# Patient Record
Sex: FEMALE | Race: WHITE | NOT HISPANIC OR LATINO | Employment: UNEMPLOYED | URBAN - METROPOLITAN AREA
[De-identification: names, ages, dates, MRNs, and addresses within clinical notes are randomized per-mention and may not be internally consistent; named-entity substitution may affect disease eponyms.]

---

## 2017-01-03 ENCOUNTER — ALLSCRIPTS OFFICE VISIT (OUTPATIENT)
Dept: OTHER | Facility: OTHER | Age: 32
End: 2017-01-03

## 2017-01-03 LAB
BILIRUB UR QL STRIP: NORMAL
CLARITY UR: NORMAL
COLOR UR: YELLOW
GLUCOSE (HISTORICAL): NORMAL
HGB UR QL STRIP.AUTO: NORMAL
KETONES UR STRIP-MCNC: NORMAL MG/DL
LEUKOCYTE ESTERASE UR QL STRIP: NORMAL
NITRITE UR QL STRIP: NORMAL
PH UR STRIP.AUTO: 5 [PH]
PROT UR STRIP-MCNC: NORMAL MG/DL
SP GR UR STRIP.AUTO: 1.01

## 2017-01-08 ENCOUNTER — HOSPITAL ENCOUNTER (OUTPATIENT)
Dept: RADIOLOGY | Facility: HOSPITAL | Age: 32
Discharge: HOME | End: 2017-01-08
Attending: PHYSICAL MEDICINE & REHABILITATION
Payer: COMMERCIAL

## 2017-01-08 DIAGNOSIS — S32.012S CLOSED UNSTABLE BURST FRACTURE OF FIRST LUMBAR VERTEBRA, SEQUELA: ICD-10-CM

## 2017-01-08 PROCEDURE — 72148 MRI LUMBAR SPINE W/O DYE: CPT

## 2017-01-16 ENCOUNTER — ALLSCRIPTS OFFICE VISIT (OUTPATIENT)
Dept: OTHER | Facility: OTHER | Age: 32
End: 2017-01-16

## 2017-01-18 ENCOUNTER — ALLSCRIPTS OFFICE VISIT (OUTPATIENT)
Dept: OTHER | Facility: OTHER | Age: 32
End: 2017-01-18

## 2017-02-02 ENCOUNTER — GENERIC CONVERSION - ENCOUNTER (OUTPATIENT)
Dept: OTHER | Facility: OTHER | Age: 32
End: 2017-02-02

## 2017-02-02 ENCOUNTER — LAB CONVERSION - ENCOUNTER (OUTPATIENT)
Dept: OTHER | Facility: OTHER | Age: 32
End: 2017-02-02

## 2017-02-02 LAB
HBA1C MFR BLD HPLC: 4.8 % OF TOTAL HGB
TSH SERPL DL<=0.05 MIU/L-ACNC: 2.25 MIU/L

## 2017-02-22 ENCOUNTER — ALLSCRIPTS OFFICE VISIT (OUTPATIENT)
Dept: OTHER | Facility: OTHER | Age: 32
End: 2017-02-22

## 2017-02-23 ENCOUNTER — HOSPITAL ENCOUNTER (OUTPATIENT)
Dept: RADIOLOGY | Facility: HOSPITAL | Age: 32
Discharge: HOME | End: 2017-02-23
Payer: COMMERCIAL

## 2017-02-23 ENCOUNTER — TRANSCRIBE ORDERS (OUTPATIENT)
Dept: RADIOLOGY | Facility: HOSPITAL | Age: 32
End: 2017-02-23

## 2017-02-23 DIAGNOSIS — M54.9 BACKACHE, UNSPECIFIED: Primary | ICD-10-CM

## 2017-02-23 DIAGNOSIS — M54.9 BACKACHE, UNSPECIFIED: ICD-10-CM

## 2017-02-23 PROCEDURE — 72072 X-RAY EXAM THORAC SPINE 3VWS: CPT

## 2017-02-24 ENCOUNTER — GENERIC CONVERSION - ENCOUNTER (OUTPATIENT)
Dept: OTHER | Facility: OTHER | Age: 32
End: 2017-02-24

## 2017-03-06 ENCOUNTER — APPOINTMENT (OUTPATIENT)
Dept: PHYSICAL THERAPY | Facility: REHABILITATION | Age: 32
End: 2017-03-06
Payer: COMMERCIAL

## 2017-03-06 PROCEDURE — 97112 NEUROMUSCULAR REEDUCATION: CPT

## 2017-03-06 PROCEDURE — 97162 PT EVAL MOD COMPLEX 30 MIN: CPT

## 2017-03-07 ENCOUNTER — GENERIC CONVERSION - ENCOUNTER (OUTPATIENT)
Dept: OTHER | Facility: OTHER | Age: 32
End: 2017-03-07

## 2017-03-10 ENCOUNTER — APPOINTMENT (OUTPATIENT)
Dept: PHYSICAL THERAPY | Facility: REHABILITATION | Age: 32
End: 2017-03-10
Payer: COMMERCIAL

## 2017-03-10 PROCEDURE — 97110 THERAPEUTIC EXERCISES: CPT

## 2017-03-10 PROCEDURE — 97112 NEUROMUSCULAR REEDUCATION: CPT

## 2017-03-14 ENCOUNTER — APPOINTMENT (OUTPATIENT)
Dept: PHYSICAL THERAPY | Facility: REHABILITATION | Age: 32
End: 2017-03-14
Payer: COMMERCIAL

## 2017-03-17 ENCOUNTER — APPOINTMENT (OUTPATIENT)
Dept: PHYSICAL THERAPY | Facility: REHABILITATION | Age: 32
End: 2017-03-17
Payer: COMMERCIAL

## 2017-03-17 PROCEDURE — 97112 NEUROMUSCULAR REEDUCATION: CPT

## 2017-03-21 ENCOUNTER — APPOINTMENT (OUTPATIENT)
Dept: PHYSICAL THERAPY | Facility: REHABILITATION | Age: 32
End: 2017-03-21
Payer: COMMERCIAL

## 2017-03-21 PROCEDURE — 97112 NEUROMUSCULAR REEDUCATION: CPT

## 2017-03-21 PROCEDURE — 97110 THERAPEUTIC EXERCISES: CPT

## 2017-03-24 ENCOUNTER — APPOINTMENT (OUTPATIENT)
Dept: PHYSICAL THERAPY | Facility: REHABILITATION | Age: 32
End: 2017-03-24
Payer: COMMERCIAL

## 2017-03-24 PROCEDURE — 97112 NEUROMUSCULAR REEDUCATION: CPT

## 2017-03-24 PROCEDURE — 97140 MANUAL THERAPY 1/> REGIONS: CPT

## 2017-03-24 PROCEDURE — 97110 THERAPEUTIC EXERCISES: CPT

## 2017-03-28 ENCOUNTER — APPOINTMENT (OUTPATIENT)
Dept: PHYSICAL THERAPY | Facility: REHABILITATION | Age: 32
End: 2017-03-28
Payer: COMMERCIAL

## 2017-03-28 PROCEDURE — 97112 NEUROMUSCULAR REEDUCATION: CPT

## 2017-03-28 PROCEDURE — 97110 THERAPEUTIC EXERCISES: CPT

## 2017-03-28 PROCEDURE — 97140 MANUAL THERAPY 1/> REGIONS: CPT

## 2017-03-31 ENCOUNTER — APPOINTMENT (OUTPATIENT)
Dept: PHYSICAL THERAPY | Facility: REHABILITATION | Age: 32
End: 2017-03-31
Payer: COMMERCIAL

## 2017-04-03 ENCOUNTER — APPOINTMENT (OUTPATIENT)
Dept: PHYSICAL THERAPY | Facility: REHABILITATION | Age: 32
End: 2017-04-03
Payer: COMMERCIAL

## 2017-04-03 PROCEDURE — 97140 MANUAL THERAPY 1/> REGIONS: CPT

## 2017-04-03 PROCEDURE — 97112 NEUROMUSCULAR REEDUCATION: CPT

## 2017-04-03 PROCEDURE — 97110 THERAPEUTIC EXERCISES: CPT

## 2017-04-07 ENCOUNTER — APPOINTMENT (OUTPATIENT)
Dept: PHYSICAL THERAPY | Facility: REHABILITATION | Age: 32
End: 2017-04-07
Payer: COMMERCIAL

## 2017-04-07 PROCEDURE — 97110 THERAPEUTIC EXERCISES: CPT

## 2017-04-07 PROCEDURE — 97140 MANUAL THERAPY 1/> REGIONS: CPT

## 2017-04-07 PROCEDURE — 97112 NEUROMUSCULAR REEDUCATION: CPT

## 2017-04-11 ENCOUNTER — APPOINTMENT (OUTPATIENT)
Dept: PHYSICAL THERAPY | Facility: REHABILITATION | Age: 32
End: 2017-04-11
Payer: COMMERCIAL

## 2017-04-11 PROCEDURE — 97112 NEUROMUSCULAR REEDUCATION: CPT

## 2017-04-11 PROCEDURE — 97110 THERAPEUTIC EXERCISES: CPT

## 2017-04-11 PROCEDURE — 97140 MANUAL THERAPY 1/> REGIONS: CPT

## 2017-04-17 ENCOUNTER — APPOINTMENT (OUTPATIENT)
Dept: PHYSICAL THERAPY | Facility: REHABILITATION | Age: 32
End: 2017-04-17
Payer: COMMERCIAL

## 2017-04-17 PROCEDURE — 97110 THERAPEUTIC EXERCISES: CPT

## 2017-04-17 PROCEDURE — 97112 NEUROMUSCULAR REEDUCATION: CPT

## 2017-04-17 PROCEDURE — 97140 MANUAL THERAPY 1/> REGIONS: CPT

## 2017-04-18 ENCOUNTER — GENERIC CONVERSION - ENCOUNTER (OUTPATIENT)
Dept: OTHER | Facility: OTHER | Age: 32
End: 2017-04-18

## 2017-04-21 ENCOUNTER — APPOINTMENT (OUTPATIENT)
Dept: PHYSICAL THERAPY | Facility: REHABILITATION | Age: 32
End: 2017-04-21
Payer: COMMERCIAL

## 2017-05-08 ENCOUNTER — ALLSCRIPTS OFFICE VISIT (OUTPATIENT)
Dept: OTHER | Facility: OTHER | Age: 32
End: 2017-05-08

## 2017-06-20 ENCOUNTER — ALLSCRIPTS OFFICE VISIT (OUTPATIENT)
Dept: OTHER | Facility: OTHER | Age: 32
End: 2017-06-20

## 2017-06-20 DIAGNOSIS — R11.0 NAUSEA: ICD-10-CM

## 2017-06-20 DIAGNOSIS — R53.83 OTHER FATIGUE: ICD-10-CM

## 2017-06-20 LAB
BILIRUB UR QL STRIP: NEGATIVE
CLARITY UR: NORMAL
COLOR UR: YELLOW
GLUCOSE (HISTORICAL): NEGATIVE
HGB UR QL STRIP.AUTO: NEGATIVE
KETONES UR STRIP-MCNC: NEGATIVE MG/DL
LEUKOCYTE ESTERASE UR QL STRIP: NORMAL
NITRITE UR QL STRIP: NEGATIVE
PH UR STRIP.AUTO: 6 [PH]
PROT UR STRIP-MCNC: NEGATIVE MG/DL
SP GR UR STRIP.AUTO: 1
UROBILINOGEN UR QL STRIP.AUTO: 0.2

## 2017-06-21 ENCOUNTER — APPOINTMENT (OUTPATIENT)
Dept: LAB | Facility: HOSPITAL | Age: 32
End: 2017-06-21
Payer: COMMERCIAL

## 2017-06-21 DIAGNOSIS — R11.0 NAUSEA: ICD-10-CM

## 2017-06-21 DIAGNOSIS — R53.83 OTHER FATIGUE: ICD-10-CM

## 2017-06-21 LAB
BACTERIA UR QL AUTO: NORMAL /HPF
BILIRUB UR QL STRIP: NEGATIVE
CLARITY UR: CLEAR
COLOR UR: YELLOW
GLUCOSE UR STRIP-MCNC: NEGATIVE MG/DL
HGB UR QL STRIP.AUTO: NEGATIVE
HYALINE CASTS #/AREA URNS LPF: NORMAL /LPF
KETONES UR STRIP-MCNC: NEGATIVE MG/DL
LEUKOCYTE ESTERASE UR QL STRIP: ABNORMAL
NITRITE UR QL STRIP: NEGATIVE
NON-SQ EPI CELLS URNS QL MICRO: NORMAL /HPF
PH UR STRIP.AUTO: 6.5 [PH] (ref 4.5–8)
PROT UR STRIP-MCNC: NEGATIVE MG/DL
RBC #/AREA URNS AUTO: NORMAL /HPF
SP GR UR STRIP.AUTO: 1.01 (ref 1–1.03)
UROBILINOGEN UR QL STRIP.AUTO: 0.2 E.U./DL
WBC #/AREA URNS AUTO: NORMAL /HPF

## 2017-06-21 PROCEDURE — 81001 URINALYSIS AUTO W/SCOPE: CPT

## 2017-06-22 ENCOUNTER — GENERIC CONVERSION - ENCOUNTER (OUTPATIENT)
Dept: OTHER | Facility: OTHER | Age: 32
End: 2017-06-22

## 2017-06-22 ENCOUNTER — LAB CONVERSION - ENCOUNTER (OUTPATIENT)
Dept: OTHER | Facility: OTHER | Age: 32
End: 2017-06-22

## 2017-06-22 LAB
25(OH)D3 SERPL-MCNC: 35 NG/ML (ref 30–100)
A/G RATIO (HISTORICAL): 1.7 (CALC) (ref 1–2.5)
ALBUMIN SERPL BCP-MCNC: 4.5 G/DL (ref 3.6–5.1)
ALP SERPL-CCNC: 72 U/L (ref 33–115)
ALT SERPL W P-5'-P-CCNC: 15 U/L (ref 6–29)
AST SERPL W P-5'-P-CCNC: 14 U/L (ref 10–30)
BASOPHILS # BLD AUTO: 0.3 %
BASOPHILS # BLD AUTO: 11 CELLS/UL (ref 0–200)
BILIRUB SERPL-MCNC: 0.5 MG/DL (ref 0.2–1.2)
BUN SERPL-MCNC: 9 MG/DL (ref 7–25)
BUN/CREA RATIO (HISTORICAL): NORMAL (CALC) (ref 6–22)
C-REACTIVE PROTEIN (HISTORICAL): <0.1 MG/DL
CALCIUM SERPL-MCNC: 9.4 MG/DL (ref 8.6–10.2)
CHLORIDE SERPL-SCNC: 102 MMOL/L (ref 98–110)
CO2 SERPL-SCNC: 27 MMOL/L (ref 20–31)
CREAT SERPL-MCNC: 0.62 MG/DL (ref 0.5–1.1)
DEPRECATED RDW RBC AUTO: 13.1 % (ref 11–15)
EGFR AFRICAN AMERICAN (HISTORICAL): 141 ML/MIN/1.73M2
EGFR-AMERICAN CALC (HISTORICAL): 122 ML/MIN/1.73M2
EOSINOPHIL # BLD AUTO: 2.2 %
EOSINOPHIL # BLD AUTO: 84 CELLS/UL (ref 15–500)
ERYTHROCYTE SEDIMENTATION RATE (HISTORICAL): 2 MM/H
GAMMA GLOBULIN (HISTORICAL): 2.6 G/DL (CALC) (ref 1.9–3.7)
GLUCOSE (HISTORICAL): 77 MG/DL (ref 65–99)
HCT VFR BLD AUTO: 43.3 % (ref 35–45)
HGB BLD-MCNC: 14.6 G/DL (ref 11.7–15.5)
LYME IGG/IGM AB (HISTORICAL): <0.9 INDEX
LYMPHOCYTES # BLD AUTO: 1288 CELLS/UL (ref 850–3900)
LYMPHOCYTES # BLD AUTO: 33.9 %
MAGNESIUM SERPL-MCNC: 2.1 MG/DL (ref 1.5–2.5)
MCH RBC QN AUTO: 31.4 PG (ref 27–33)
MCHC RBC AUTO-ENTMCNC: 33.7 G/DL (ref 32–36)
MCV RBC AUTO: 93.3 FL (ref 80–100)
MONOCYTES # BLD AUTO: 228 CELLS/UL (ref 200–950)
MONOCYTES (HISTORICAL): 6 %
NEUTROPHILS # BLD AUTO: 2189 CELLS/UL (ref 1500–7800)
NEUTROPHILS # BLD AUTO: 57.6 %
PLATELET # BLD AUTO: 232 THOUSAND/UL (ref 140–400)
PMV BLD AUTO: 8 FL (ref 7.5–12.5)
POTASSIUM SERPL-SCNC: 4.1 MMOL/L (ref 3.5–5.3)
RBC # BLD AUTO: 4.65 MILLION/UL (ref 3.8–5.1)
SODIUM SERPL-SCNC: 135 MMOL/L (ref 135–146)
TOTAL PROTEIN (HISTORICAL): 7.1 G/DL (ref 6.1–8.1)
TSH SERPL DL<=0.05 MIU/L-ACNC: 1.75 MIU/L
VIT B12 SERPL-MCNC: 594 PG/ML (ref 200–1100)
WBC # BLD AUTO: 3.8 THOUSAND/UL (ref 3.8–10.8)

## 2017-10-03 ENCOUNTER — GENERIC CONVERSION - ENCOUNTER (OUTPATIENT)
Dept: OTHER | Facility: OTHER | Age: 32
End: 2017-10-03

## 2017-10-16 ENCOUNTER — GENERIC CONVERSION - ENCOUNTER (OUTPATIENT)
Dept: OTHER | Facility: OTHER | Age: 32
End: 2017-10-16

## 2017-12-05 ENCOUNTER — ANESTHESIA EVENT (OUTPATIENT)
Dept: PERIOP | Facility: AMBULARY SURGERY CENTER | Age: 32
End: 2017-12-05
Payer: COMMERCIAL

## 2017-12-11 RX ORDER — METHOCARBAMOL 500 MG/1
500 TABLET, FILM COATED ORAL AS NEEDED
COMMUNITY
End: 2018-06-05

## 2017-12-11 RX ORDER — HYDROXYZINE HYDROCHLORIDE 25 MG/1
25 TABLET, FILM COATED ORAL EVERY 6 HOURS PRN
COMMUNITY
End: 2019-02-27

## 2017-12-11 RX ORDER — CARBAMAZEPINE 400 MG/1
400 TABLET, EXTENDED RELEASE ORAL 2 TIMES DAILY
COMMUNITY
End: 2018-02-22 | Stop reason: SDUPTHER

## 2017-12-11 RX ORDER — CARBAMAZEPINE 200 MG/1
200 TABLET ORAL 2 TIMES DAILY
COMMUNITY
End: 2021-04-09

## 2017-12-11 RX ORDER — ONDANSETRON 4 MG/1
4 TABLET, FILM COATED ORAL EVERY 8 HOURS PRN
COMMUNITY
End: 2018-02-22 | Stop reason: ALTCHOICE

## 2017-12-11 RX ORDER — TRAMADOL HYDROCHLORIDE 50 MG/1
50 TABLET ORAL EVERY 6 HOURS PRN
COMMUNITY
End: 2018-06-05

## 2017-12-12 ENCOUNTER — ANESTHESIA (OUTPATIENT)
Dept: PERIOP | Facility: AMBULARY SURGERY CENTER | Age: 32
End: 2017-12-12
Payer: COMMERCIAL

## 2017-12-12 ENCOUNTER — GENERIC CONVERSION - ENCOUNTER (OUTPATIENT)
Dept: GASTROENTEROLOGY | Facility: CLINIC | Age: 32
End: 2017-12-12

## 2017-12-12 ENCOUNTER — GENERIC CONVERSION - ENCOUNTER (OUTPATIENT)
Dept: OTHER | Facility: OTHER | Age: 32
End: 2017-12-12

## 2017-12-12 ENCOUNTER — HOSPITAL ENCOUNTER (OUTPATIENT)
Facility: AMBULARY SURGERY CENTER | Age: 32
Setting detail: OUTPATIENT SURGERY
Discharge: HOME | End: 2017-12-12
Attending: INTERNAL MEDICINE | Admitting: INTERNAL MEDICINE
Payer: COMMERCIAL

## 2017-12-12 VITALS
DIASTOLIC BLOOD PRESSURE: 57 MMHG | RESPIRATION RATE: 16 BRPM | OXYGEN SATURATION: 100 % | BODY MASS INDEX: 21.85 KG/M2 | SYSTOLIC BLOOD PRESSURE: 99 MMHG | TEMPERATURE: 97.7 F | WEIGHT: 128 LBS | HEART RATE: 72 BPM | HEIGHT: 64 IN

## 2017-12-12 DIAGNOSIS — K59.00 CONSTIPATION: ICD-10-CM

## 2017-12-12 LAB — EXT PREGNANCY TEST URINE: NEGATIVE

## 2017-12-12 PROCEDURE — 81025 URINE PREGNANCY TEST: CPT | Performed by: INTERNAL MEDICINE

## 2017-12-12 PROCEDURE — 88305 TISSUE EXAM BY PATHOLOGIST: CPT | Performed by: INTERNAL MEDICINE

## 2017-12-12 RX ORDER — PROPOFOL 10 MG/ML
INJECTION, EMULSION INTRAVENOUS CONTINUOUS PRN
Status: DISCONTINUED | OUTPATIENT
Start: 2017-12-12 | End: 2017-12-12 | Stop reason: SURG

## 2017-12-12 RX ORDER — SODIUM CHLORIDE 9 MG/ML
INJECTION, SOLUTION INTRAVENOUS CONTINUOUS PRN
Status: DISCONTINUED | OUTPATIENT
Start: 2017-12-12 | End: 2017-12-12 | Stop reason: SURG

## 2017-12-12 RX ORDER — PROPOFOL 10 MG/ML
INJECTION, EMULSION INTRAVENOUS AS NEEDED
Status: DISCONTINUED | OUTPATIENT
Start: 2017-12-12 | End: 2017-12-12 | Stop reason: SURG

## 2017-12-12 RX ADMIN — PROPOFOL 50 MG: 10 INJECTION, EMULSION INTRAVENOUS at 08:43

## 2017-12-12 RX ADMIN — PROPOFOL 180 MCG/KG/MIN: 10 INJECTION, EMULSION INTRAVENOUS at 08:41

## 2017-12-12 RX ADMIN — PROPOFOL 100 MG: 10 INJECTION, EMULSION INTRAVENOUS at 08:41

## 2017-12-12 RX ADMIN — SODIUM CHLORIDE: 0.9 INJECTION, SOLUTION INTRAVENOUS at 08:20

## 2017-12-12 RX ADMIN — PROPOFOL 50 MG: 10 INJECTION, EMULSION INTRAVENOUS at 08:42

## 2017-12-12 NOTE — ANESTHESIA POSTPROCEDURE EVALUATION
Post-Op Assessment Note      CV Status:  Stable    Mental Status:  Alert and awake    Hydration Status:  Euvolemic    PONV Controlled:  Controlled    Airway Patency:  Patent    Post Op Vitals Reviewed: Yes          Staff: CRNA           BP   111/62   Temp      Pulse  76   Resp   18   SpO2   100%

## 2017-12-12 NOTE — ANESTHESIA PREPROCEDURE EVALUATION
Review of Systems/Medical History  Patient summary reviewed  Chart reviewed      Cardiovascular  No hypertension , No CAD, ,    Pulmonary  Not a smoker , No asthma: , ,        GI/Hepatic            Endo/Other     GYN       Hematology   Musculoskeletal       Neurology    Motor deficit , Spinal cord lesion,   Comment: Partial L1 cord injury after a fall s/p T12-L2 fusion; stable per notes  Psychology   Depression , bipolar disorder,            Physical Exam    Airway    Mallampati score: II  TM Distance: >3 FB       Dental       Cardiovascular      Pulmonary      Other Findings        Anesthesia Plan  ASA Score- 2       Anesthesia Type- IV sedation with anesthesia with ASA Monitors  Additional Monitors:   Airway Plan:     Comment: Hershall Sacks, M D , have personally seen and evaluated the patient prior to anesthetic care  I have reviewed the pre-anesthetic record, and other medical records if appropriate to the anesthetic care  If a CRNA is involved in the case, I have reviewed the CRNA assessment, if present, and agree  Risks/benefits and alternatives discussed with patient including possible PONV, sore throat, and possibility of rare anesthetic and surgical emergencies          Induction-       Informed Consent- Anesthetic plan and risks discussed with patient  I personally reviewed this patient with the CRNA  Discussed and agreed on the Anesthesia Plan with the CRNA  Kailey Garzon

## 2017-12-12 NOTE — OP NOTE
Colonoscopy Procedure Note    Procedure: Colonoscopy    Sedation: Monitored anesthesia care, check anesthesia records      ASA Class: 2    INDICATIONS: Constipation    POST-OP DIAGNOSIS: See the impression below    Procedure Details     Prior colonoscopy: No prior colonoscopy  Informed consent was obtained for the procedure, including sedation  Risks of perforation, hemorrhage, adverse drug reaction and aspiration were discussed  The patient was placed in the left lateral decubitus position  Based on the pre-procedure assessment, including review of the patient's medical history, medications, allergies, and review of systems, she had been deemed to be an appropriate candidate for conscious sedation; she was therefore sedated with the medications listed below  The patient was monitored continuously with telemetry, pulse oximetry, blood pressure monitoring, and direct observations  A rectal examination was performed  The variable-stiffness pediatric colonoscope was inserted into the rectum and advanced under direct vision to the cecum, which was identified by the ileocecal valve and appendiceal orifice  The quality of the colonic preparation was good  A careful inspection was made as the colonoscope was withdrawn, including a retroflexed view of the rectum; findings and interventions are described below  Start time:  8:41 a m  Cecum time 8:44 a m  End time 8:52 a m  Findings:  1  Diminutive ascending colon polyp, removed using biopsy forceps  2   Otherwise normal appearing colonic mucosa  3   Retroflexion was performed and revealed small internal hemorrhoids  Complications:  None; patient tolerated the procedure well  Impression:    1  Single ascending colon polyp  2   Small internal hemorrhoids  Recommendations:  High-fiber diet  Continue Linzess as as you are doing  Follow-up biopsy results in 2-3 weeks    If the polyp is adenomatous, repeat colonoscopy in 5 years otherwise repeat when age-appropriate screening is recommended

## 2017-12-12 NOTE — H&P
History and Physical - [facility]  Vincent Donohue 29 y o  female MRN: [id number]    HPI: Vincent Donohue is a 29y o  year old female who presents with change in bowel habits and chronic constipation  Review of Systems    Historical Information   Past Medical History:   Diagnosis Date    Anxiety     Depression     Foot drop     Spinal cord injury at C1-C4 level [facility])      Past Surgical History:   Procedure Laterality Date    SPINAL FUSION       Social History   History   Alcohol Use    Yes     Comment: 2 wkly     History   Drug Use No     History   Smoking Status    Former Smoker    Packs/day: 1 00    Quit date: 12/12/2016   Smokeless Tobacco    Never Used     History reviewed  No pertinent family history  Meds/Allergies     Prescriptions Prior to Admission   Medication    carBAMazepine (TEGretol XR) 400 mg 12 hr tablet    carBAMazepine (TEGretol) 200 mg tablet    hydrOXYzine HCL (ATARAX) 25 mg tablet    Linaclotide (LINZESS) 145 MCG CAPS    methocarbamol (ROBAXIN) 500 mg tablet    Mirabegron ER (MYRBETRIQ) 50 MG TB24    ondansetron (ZOFRAN) 4 mg tablet    traMADol (ULTRAM) 50 mg tablet       No Known Allergies    Objective     Blood pressure 136/80, pulse 98, temperature 98 6 °F (37 °C), temperature source Temporal, resp  rate 18, height 5' 4" (1 626 m), weight 58 1 kg (128 lb), SpO2 98 %  PHYSICAL EXAM    Gen: NAD  CV: RRR  CHEST: Clear  ABD: soft, NT/ND  EXT: no edema  Neuro: AAO      ASSESSMENT/PLAN:  This is a 29y o  year old female here for change in bowel habits and chronic constipation       PLAN:   Procedure: colonoscopy

## 2017-12-12 NOTE — PROGRESS NOTES
Tolerating juice and pretzels  Pt  Without any complaints  Denies any abdomiinal pain  VSS  Mother at bedside

## 2018-01-02 ENCOUNTER — GENERIC CONVERSION - ENCOUNTER (OUTPATIENT)
Dept: OTHER | Facility: OTHER | Age: 33
End: 2018-01-02

## 2018-01-03 DIAGNOSIS — N31.9 NEUROMUSCULAR DYSFUNCTION OF BLADDER: ICD-10-CM

## 2018-01-11 NOTE — RESULT NOTES
Verified Results  (1) URINALYSIS w URINE C/S REFLEX (will reflex a microscopy if leukocytes, occult blood, or nitrites are not within normal limits) 21Jun2017 08:32AM Tomás Malave    Order Number: [id number]     Test Name Result Flag Reference   COLOR Yellow     CLARITY Clear     PH UA 6 5  4 5-8 0   LEUKOCYTE ESTERASE UA Trace A Negative   NITRITE UA Negative  Negative   PROTEIN UA Negative mg/dl  Negative   GLUCOSE UA Negative mg/dl  Negative   KETONES UA Negative mg/dl  Negative   UROBILINOGEN UA 0 2 E U /dl  0 2, 1 0 E U /dl   BILIRUBIN UA Negative  Negative   BLOOD UA Negative  Negative   SPECIFIC GRAVITY UA 1 011  1 003-1 030   BACTERIA None Seen /hpf  None Seen, Occasional   EPITHELIAL CELLS None Seen /hpf  None Seen, Occasional   HYALINE CASTS None Seen /lpf  None Seen   RBC UA None Seen /hpf  None Seen   WBC UA None Seen /hpf  None Seen

## 2018-01-12 VITALS
SYSTOLIC BLOOD PRESSURE: 120 MMHG | WEIGHT: 150.49 LBS | RESPIRATION RATE: 14 BRPM | TEMPERATURE: 98.1 F | HEIGHT: 64 IN | DIASTOLIC BLOOD PRESSURE: 76 MMHG | HEART RATE: 76 BPM | BODY MASS INDEX: 25.69 KG/M2

## 2018-01-12 VITALS
WEIGHT: 150.13 LBS | SYSTOLIC BLOOD PRESSURE: 116 MMHG | HEART RATE: 78 BPM | HEIGHT: 64 IN | RESPIRATION RATE: 14 BRPM | DIASTOLIC BLOOD PRESSURE: 72 MMHG | TEMPERATURE: 98.3 F | BODY MASS INDEX: 25.63 KG/M2

## 2018-01-12 VITALS
HEART RATE: 78 BPM | TEMPERATURE: 98.4 F | SYSTOLIC BLOOD PRESSURE: 116 MMHG | WEIGHT: 151.5 LBS | DIASTOLIC BLOOD PRESSURE: 74 MMHG | HEIGHT: 64 IN | RESPIRATION RATE: 16 BRPM | BODY MASS INDEX: 25.86 KG/M2

## 2018-01-12 NOTE — RESULT NOTES
Message   can you please let pt know that her thyroid and blood sugar bw was within a normal range? thank you     Verified Results  (Q) TSH, 3RD GENERATION W/REFLEX TO FT4 [id number] 11:31AM Hallie Maddox     Test Name Result Flag Reference   TSH W/REFLEX TO FT4 2 25 mIU/L     Reference Range                         > or = 20 Years  0 40-4 50                              Pregnancy Ranges            First trimester    0 26-2 66            Second trimester   0 55-2 73            Third trimester    0 43-2 91     (Q) HEMOGLOBIN A1c [id number] 11:31AM Hallie Maddox   REPORT COMMENT:  FASTING:NO     Test Name Result Flag Reference   HEMOGLOBIN A1c 4 8 % of total Hgb  <5 7   According to ADA guidelines, hemoglobin A1c <7 0%  represents optimal control in non-pregnant diabetic  patients  Different metrics may apply to specific  patient populations  Standards of Medical Care in    Diabetes Care  2013;36:s11-s66     For the purpose of screening for the presence of  diabetes  <5 7%       Consistent with the absence of diabetes  5 7-6 4%    Consistent with increased risk for diabetes              (prediabetes)  >or=6 5%    Consistent with diabetes     This assay result is consistent with a decreased risk  of diabetes  Currently, no consensus exists for use of hemoglobin  A1c for diagnosis of diabetes for children

## 2018-01-13 VITALS
WEIGHT: 152.13 LBS | DIASTOLIC BLOOD PRESSURE: 66 MMHG | TEMPERATURE: 98.9 F | HEIGHT: 64 IN | SYSTOLIC BLOOD PRESSURE: 114 MMHG | HEART RATE: 98 BPM | BODY MASS INDEX: 25.97 KG/M2 | RESPIRATION RATE: 16 BRPM

## 2018-01-14 VITALS
WEIGHT: 151 LBS | HEART RATE: 80 BPM | DIASTOLIC BLOOD PRESSURE: 90 MMHG | SYSTOLIC BLOOD PRESSURE: 122 MMHG | HEIGHT: 64 IN | BODY MASS INDEX: 25.78 KG/M2

## 2018-01-14 VITALS
BODY MASS INDEX: 24.14 KG/M2 | SYSTOLIC BLOOD PRESSURE: 130 MMHG | WEIGHT: 141.38 LBS | HEIGHT: 64 IN | TEMPERATURE: 98.5 F | DIASTOLIC BLOOD PRESSURE: 70 MMHG | HEART RATE: 80 BPM

## 2018-01-14 NOTE — RESULT NOTES
Verified Results  (1) CBC/PLT/DIFF 21Jun2017 10:29AM Hallie Maddox     Test Name Result Flag Reference   WHITE BLOOD CELL COUNT 3 8 Thousand/uL  3 8-10 8   RED BLOOD CELL COUNT 4 65 Million/uL  3 80-5 10   HEMOGLOBIN 14 6 g/dL  11 7-15 5   HEMATOCRIT 43 3 %  35 0-45 0   MCV 93 3 fL  80 0-100 0   MCH 31 4 pg  27 0-33 0   MCHC 33 7 g/dL  32 0-36 0   RDW 13 1 %  11 0-15 0   PLATELET COUNT 220 Thousand/uL  140-400   ABSOLUTE NEUTROPHILS 2189 cells/uL  6545-5780   ABSOLUTE LYMPHOCYTES 1288 cells/uL  850-3900   ABSOLUTE MONOCYTES 228 cells/uL  200-950   ABSOLUTE EOSINOPHILS 84 cells/uL     ABSOLUTE BASOPHILS 11 cells/uL  0-200   NEUTROPHILS 57 6 %     LYMPHOCYTES 33 9 %     MONOCYTES 6 0 %     EOSINOPHILS 2 2 %     BASOPHILS 0 3 %     MPV 8 0 fL  7 5-12 5     (1) COMPREHENSIVE METABOLIC PANEL [id number] 72:47VY [employer]     Test Name Result Flag Reference   GLUCOSE 77 mg/dL  65-99   Fasting reference interval   UREA NITROGEN (BUN) 9 mg/dL  7-25   CREATININE 0 62 mg/dL  0 50-1 10   eGFR NON-AFR   AMERICAN 122 mL/min/1 73m2  > OR = 60   eGFR AFRICAN AMERICAN 141 mL/min/1 73m2  > OR = 60   BUN/CREATININE RATIO   0-15   NOT APPLICABLE (calc)   SODIUM 135 mmol/L  135-146   POTASSIUM 4 1 mmol/L  3 5-5 3   CHLORIDE 102 mmol/L     CARBON DIOXIDE 27 mmol/L  20-31   CALCIUM 9 4 mg/dL  8 6-10 2   PROTEIN, TOTAL 7 1 g/dL  6 1-8 1   ALBUMIN 4 5 g/dL  3 6-5 1   GLOBULIN 2 6 g/dL (calc)  1 9-3 7   ALBUMIN/GLOBULIN RATIO 1 7 (calc)  1 0-2 5   BILIRUBIN, TOTAL 0 5 mg/dL  0 2-1 2   ALKALINE PHOSPHATASE 72 U/L     AST 14 U/L  10-30   ALT 15 U/L  6-29     (1) MAGNESIUM 21Jun2017 10:29AM Muragalexie, Hallie     Test Name Result Flag Reference   MAGNESIUM 2 1 mg/dL  1 5-2 5     (1) C-REACTIVE PROTEIN 21Jun2017 10:29AM Muragali, Hallie     Test Name Result Flag Reference   C-REACTIVE PROTEIN <0 10 mg/dL  <0 80   Please be advised that patients taking Carboxypenicillins  may exhibit falsely decreased C-Reactive Protein levels  due to an analytical interference in this assay  (1) VITAMIN B12 21Jun2017 10:29AM Hallie Maddox     Test Name Result Flag Reference   VITAMIN B12 594 pg/mL  200-1100     (Q) SED RATE BY MODIFIED Sally Hock 21Jun2017 10:29AM Hallie Maddox     Test Name Result Flag Reference   SED RATE BY MODIFIED$WESTERGREN 2 mm/h  < OR = 20     (Q) LYME DISEASE AB, TOTAL W/REFL WB (IGG, IGM) [id number] 10:29AM Hallie Maddox     Test Name Result Flag Reference   LYME AB SCREEN <0 90 index     Index                Interpretation                     -----                --------------                     < 0 90               Negative                     0  90-1 09            Equivocal                     > 1 09               Positive      As recommended by the Food and Drug Administration   (FDA), all samples with positive or equivocal   results in a Borrelia burgdorferi antibody screen  will be tested using a blot method  Positive or   equivocal screening test results should not be   interpreted as truly positive until verified as such   using a supplemental assay (e g , B  burgdorferi blot)  The screening test and/or blot for B  burgdorferi   antibodies may be falsely negative in early stages  of Lyme disease, including the period when erythema   migrans is apparent  *(Q) VITAMIN D, 25-HYDROXY, LC/MS/MS 21Jun2017 10:29AM Hallie Maddox     Test Name Result Flag Reference   VITAMIN D, 25-OH, TOTAL 35 ng/mL     Vitamin D Status         25-OH Vitamin D:     Deficiency:                    <20 ng/mL  Insufficiency:             20 - 29 ng/mL  Optimal:                 > or = 30 ng/mL     For 25-OH Vitamin D testing on patients on   D2-supplementation and patients for whom quantitation   of D2 and D3 fractions is required, the [employer] to the WorldureD(TM)  25-OH VIT D, (D2,D3), LC/MS/MS is recommended: order   code 56478 (patients >2yrs)       For more information on this test, go to:  http://ADENTS HTI/faq/AXH916  (This link is being provided for   informational/educational purposes only )     (Q) TSH, 3RD GENERATION W/REFLEX TO FT4 21Jun2017 10:29AM Hallie Maddox   REPORT COMMENT:  FASTING:YES     Test Name Result Flag Reference   TSH W/REFLEX TO FT4 1 75 mIU/L     Reference Range                         > or = 20 Years  0 40-4 50                              Pregnancy Ranges            First trimester    0 26-2 66            Second trimester   0 55-2 73            Third trimester    0 43-2 91

## 2018-01-16 NOTE — RESULT NOTES
Message   Can you please elt pt know that her back x ray did not show any acute abnormalities  thank you     Verified Results  * XR SPINE THORACIC 3 VIEW [id number] 01:54PM Hallie Maddox     Test Name Result Flag Reference   XR SPINE THORACIC 3 VW (Report)     THORACIC SPINE     INDICATION: Back ache  COMPARISON: Lumbar spine plain films from 10/13/2011, and lumbar spine MR from 1/8/2017  VIEWS: AP, lateral and coned-down projections     IMAGES: 4     FINDINGS:     Thoracic vertebrae demonstrate normal stature and alignment  Partially visualized is orthopedic hardware in T12, and the lumbar spine  There is no fracture or pathologic bone lesion  There is no displacement of the paraspinal line  The pedicles are intact  IMPRESSION:     No acute osseous injury to the thoracic spine         Workstation performed: [id number]     Signed by:   Yesenia Brown MD   2/24/17

## 2018-01-22 VITALS
HEIGHT: 64 IN | HEART RATE: 64 BPM | SYSTOLIC BLOOD PRESSURE: 102 MMHG | DIASTOLIC BLOOD PRESSURE: 62 MMHG | WEIGHT: 135.25 LBS | TEMPERATURE: 98.1 F | BODY MASS INDEX: 23.09 KG/M2 | RESPIRATION RATE: 16 BRPM

## 2018-01-22 VITALS
DIASTOLIC BLOOD PRESSURE: 68 MMHG | HEART RATE: 87 BPM | WEIGHT: 133 LBS | TEMPERATURE: 97.8 F | SYSTOLIC BLOOD PRESSURE: 112 MMHG | OXYGEN SATURATION: 97 % | BODY MASS INDEX: 22.83 KG/M2

## 2018-01-23 ENCOUNTER — ALLSCRIPTS OFFICE VISIT (OUTPATIENT)
Dept: OTHER | Facility: OTHER | Age: 33
End: 2018-01-23

## 2018-01-23 NOTE — RESULT NOTES
Discussion/Summary   Polyp removed was serrated adenoma, needs repeat colonoscopy in 5 years  Verified Results  (1) TISSUE EXAM [id number] 08:46AM Chrystie Samples     Test Name Result Flag Reference   LAB AP CASE REPORT (Report)     Surgical Pathology Report             Case: [id number]                   Authorizing Provider: Majo Gill MD    Collected:      12/12/2017 0846        Ordering Location:   [facility]    Received:      12/12/2017 [address] [place] [address]                            Pathologist:      Chely Kingston MD                                Specimen:  Polyp, Colorectal, ascending colon   LAB AP FINAL DIAGNOSIS (Report)     A  Polyp, Colorectal, ascending colon:  - Polypoid fragments of colonic mucosa with focal Serrated features,   suggestive of Serrated adenoma   -No evidence of high grade dysplasia or malignancy  Electronically signed by Chely Kingston MD on 12/15/2017 at 1:24 PM   LAB AP SURGICAL ADDITIONAL INFORMATION (Report)     All controls performed with the immunohistochemical stains reported above   reacted appropriately  These tests were developed and their performance   characteristics determined by [facility] or   [address] [place] [address]  They may not be cleared or approved by the [place]  Food and Drug Administration  The FDA has determined that such clearance   or approval is not necessary  These tests are used for clinical purposes  They should not be regarded as investigational or for research  This   laboratory has been approved by CLIA 88, designated as a high-complexity   laboratory and is qualified to perform these tests  LAB AP GROSS DESCRIPTION (Report)     A  The specimen is received in formalin, labeled with the patient's name   and hospital number, and is designated ascending colon polyp   The   specimen consists of 4 tan soft tissue fragments each measuring 0 2 cm  Entirely submitted  one cassettes  Note: The estimated total formalin fixation time based upon information   provided by the submitting clinician and the standard processing schedule   is under 72 hours      MCrites
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
## 2018-02-06 ENCOUNTER — TELEPHONE (OUTPATIENT)
Dept: UROLOGY | Facility: AMBULATORY SURGERY CENTER | Age: 33
End: 2018-02-06

## 2018-02-06 NOTE — TELEPHONE ENCOUNTER
Oscar Dallas  This patients Myrbetriq 50mg is approved with the patients insurance good from 1/30/18-1/30/19 34 pills for 34 days, called the patient to let her know this    Thanks  Deyanira Pastrana

## 2018-02-22 ENCOUNTER — OFFICE VISIT (OUTPATIENT)
Dept: FAMILY MEDICINE CLINIC | Facility: CLINIC | Age: 33
End: 2018-02-22
Payer: COMMERCIAL

## 2018-02-22 VITALS
TEMPERATURE: 98 F | WEIGHT: 130.6 LBS | DIASTOLIC BLOOD PRESSURE: 84 MMHG | HEART RATE: 88 BPM | HEIGHT: 64 IN | SYSTOLIC BLOOD PRESSURE: 118 MMHG | BODY MASS INDEX: 22.3 KG/M2 | RESPIRATION RATE: 14 BRPM

## 2018-02-22 DIAGNOSIS — R53.83 FATIGUE, UNSPECIFIED TYPE: ICD-10-CM

## 2018-02-22 DIAGNOSIS — L03.031 PARONYCHIA OF TOE OF RIGHT FOOT: Primary | ICD-10-CM

## 2018-02-22 DIAGNOSIS — K59.00 CONSTIPATION, UNSPECIFIED CONSTIPATION TYPE: ICD-10-CM

## 2018-02-22 DIAGNOSIS — R63.4 WEIGHT LOSS: ICD-10-CM

## 2018-02-22 DIAGNOSIS — Z87.828 H/O SPINAL CORD INJURY: ICD-10-CM

## 2018-02-22 PROCEDURE — 99214 OFFICE O/P EST MOD 30 MIN: CPT | Performed by: FAMILY MEDICINE

## 2018-02-22 RX ORDER — CEPHALEXIN 500 MG/1
500 CAPSULE ORAL EVERY 8 HOURS SCHEDULED
Qty: 21 CAPSULE | Refills: 0 | Status: SHIPPED | OUTPATIENT
Start: 2018-02-22 | End: 2018-03-07

## 2018-02-22 NOTE — ASSESSMENT & PLAN NOTE
Patient states her constipation is improved  She has been using powder form of fiber  She does maintain adequate hydration

## 2018-02-22 NOTE — ASSESSMENT & PLAN NOTE
Patient has had a 20 pound weight loss in the past year  Patient states she sometimes forgets to eat  Denies anxiety or any complaints at present  I will check blood work including thyroid, blood sugar  I would like patient to return in 2-3 months for weight check  Patient is agreeable with this

## 2018-02-22 NOTE — ASSESSMENT & PLAN NOTE
Patient appears to have paronychia  I will start patient on Keflex  I would like patient to be further evaluated and managed by Podiatry as patient  Has a history of   Spinal cord injury without sensation in her toes  Patient is agreeable with this

## 2018-02-22 NOTE — PROGRESS NOTES
FAMILY PRACTICE OFFICE VISIT       NAME: Olive Chandra  AGE: 29 y o  SEX: female       : 1985        MRN: [id number]    DATE: 2018  TIME: 3:06 PM    Assessment and Plan     Problem List Items Addressed This Visit     Paronychia of toe of right foot - Primary      Patient appears to have paronychia  I will start patient on Keflex  I would like patient to be further evaluated and managed by Podiatry as patient  Has a history of   Spinal cord injury without sensation in her toes  Patient is agreeable with this  Relevant Medications    cephalexin (KEFLEX) 500 mg capsule    Other Relevant Orders    Ambulatory referral to Podiatry    Constipation       Patient states her constipation is improved  She has been using powder form of fiber  She does maintain adequate hydration  Weight loss       Patient has had a 20 pound weight loss in the past year  Patient states she sometimes forgets to eat  Denies anxiety or any complaints at present  I will check blood work including thyroid, blood sugar  I would like patient to return in 2-3 months for weight check  Patient is agreeable with this  H/O spinal cord injury       Patient has a history of spinal cord injury with neurogenic bladder as well as foot drop  Has been to physical therapy in the past   Patient also sees Urology  Other Visit Diagnoses     Fatigue, unspecified type        Relevant Orders    CBC and differential    Comprehensive metabolic panel    TSH, 3rd generation with T4 reflex    Vitamin D 25 hydroxy    Vitamin B12            There are no Patient Instructions on file for this visit  Chief Complaint     Chief Complaint   Patient presents with    Ingrown Toenail     Patient c/o a possible ingrown toenail of the right great toe x's 1 mth  History of Present Illness     HPI  Patient presents with right inner toe  Redness  This is been going on for approximately 1 month    There is redness in the area where hernial isn't is not sure whether it is an ingrown toenail  Patient does not have sensation in her toes as she has a history of spinal   Cord injury  Denies drainage in the area  Denies fevers, chills  Patient states her constipation is better  Patient has lost weight, sometimes forgets to eat because she is busy  Denies any other complaints at present  Review of Systems   Review of Systems   Constitutional: Negative for appetite change  HENT: Negative  Respiratory: Negative for cough and shortness of breath  Cardiovascular: Negative for chest pain  Gastrointestinal: Negative for abdominal pain and constipation  Skin:        Redness around medial aspect of right great toe       Active Problem List     Patient Active Problem List   Diagnosis    Paronychia of toe of right foot    Constipation    Weight loss    H/O spinal cord injury       Past Medical History:  Past Medical History:   Diagnosis Date    Anxiety     Depression     Foot drop     Spinal cord injury at C1-C4 level [facility])        Past Surgical History:  Past Surgical History:   Procedure Laterality Date    IA COLONOSCOPY FLX DX W/COLLJ SPEC WHEN PFRMD N/A 12/12/2017    Procedure: COLONOSCOPY;  Surgeon: Qing Busby MD;  [facility]; Service: Gastroenterology    SPINAL FUSION         Family History:  No family history on file  Social History:  Social History     Social History    Marital status: Single     Spouse name: N/A    Number of children: N/A    Years of education: N/A     Occupational History    Not on file       Social History Main Topics    Smoking status: Former Smoker     Packs/day: 1 00     Quit date: 12/12/2016    Smokeless tobacco: Never Used    Alcohol use Yes      Comment: 2 wkly    Drug use: No    Sexual activity: Not on file     Other Topics Concern    Not on file     Social History Narrative    No narrative on file     I have reviewed the patient's medical history in detail; there are no changes to the history as noted in the electronic medical record  Objective     Vitals:    02/22/18 1047   BP: 118/84   Pulse: 88   Resp: 14   Temp: 98 °F (36 7 °C)     Wt Readings from Last 3 Encounters:   02/22/18 59 2 kg (130 lb 9 6 oz)   12/12/17 58 1 kg (128 lb)   10/16/17 60 3 kg (133 lb)       Physical Exam   Constitutional: She is oriented to person, place, and time  She appears well-developed and well-nourished  HENT:   Head: Normocephalic  Mouth/Throat: Oropharynx is clear and moist    Eyes: Pupils are equal, round, and reactive to light  Neck: Normal range of motion  Neck supple  No thyromegaly present  Cardiovascular: Normal rate and regular rhythm  No murmur heard  Pulmonary/Chest: Effort normal and breath sounds normal  She has no wheezes  Lymphadenopathy:     She has no cervical adenopathy  Neurological: She is alert and oriented to person, place, and time  Skin:     There is erythema noted in medial aspect of toe near distal region near toenail  No drainage noted  No tenderness noted, patient states she cannot feel sensation   Psychiatric: She has a normal mood and affect  Nursing note and vitals reviewed        Pertinent Laboratory/Diagnostic Studies:  Lab Results   Component Value Date    GLUCOSE 92 12/28/2015    BUN 16 12/28/2015    CREATININE 0 84 12/28/2015    CALCIUM 8 5 12/28/2015     12/28/2015    K 3 9 12/28/2015    CO2 25 12/28/2015     12/28/2015     Lab Results   Component Value Date    ALT 20 12/28/2015    AST 16 12/28/2015    ALKPHOS 66 12/28/2015    BILITOT 0 39 12/28/2015       Lab Results   Component Value Date    WBC 8 02 12/28/2015    HGB 14 4 12/28/2015    HCT 42 4 12/28/2015    MCV 93 12/28/2015     12/28/2015       No results found for: TSH    Lab Results   Component Value Date    CHOL 181 08/23/2014     Lab Results   Component Value Date    TRIG 112 08/23/2014     Lab Results   Component Value Date    HDL 73 08/23/2014     Lab Results   Component Value Date    LDLCALC 86 08/23/2014     Lab Results   Component Value Date    HGBA1C 4 8 02/01/2017       Results for orders placed or performed during the hospital encounter of 12/12/17   POCT pregnancy, urine   Result Value Ref Range    EXT Preg Test, Ur Negative Negative   Tissue Exam   Result Value Ref Range    Case Report       Surgical Pathology Report                         Case: [id number]                                   Authorizing Provider:  Ivette Jones MD       Collected:           12/12/2017 0846              Ordering Location:     [facility]        Received:            12/12/2017 [address] [place] [address]                                                      Pathologist:           Barbara Barber MD                                                              Specimen:    Polyp, Colorectal, ascending colon                                                         Final Diagnosis       A  Polyp, Colorectal, ascending colon:  - Polypoid fragments of colonic mucosa with focal Serrated features, suggestive of Serrated adenoma   -No evidence of high grade dysplasia or malignancy      Additional Information       All controls performed with the immunohistochemical stains reported above reacted appropriately  These tests were developed and their performance characteristics determined by [facility] or [facility]  They may not be cleared or approved by the [place]  Food and Drug Administration  The FDA has determined that such clearance or approval is not necessary  These tests are used for clinical purposes  They should not be regarded as investigational or for research   This laboratory has been approved by CLIA 88, designated as a high-complexity laboratory and is qualified to perform these tests       Gross Description       A  The specimen is received in formalin, labeled with the patient's name and hospital number, and is designated "ascending colon polyp  The specimen consists of 4 tan soft tissue fragments each measuring 0 2 cm  Entirely submitted  one cassettes  Note: The estimated total formalin fixation time based upon information provided by the submitting clinician and the standard processing schedule is under 72 hours  MCrites          Orders Placed This Encounter   Procedures    CBC and differential    Comprehensive metabolic panel    TSH, 3rd generation with T4 reflex    Vitamin D 25 hydroxy    Vitamin B12    Ambulatory referral to Podiatry       ALLERGIES:  No Known Allergies    Current Medications     Current Outpatient Prescriptions   Medication Sig Dispense Refill    carBAMazepine (TEGretol) 200 mg tablet Take 200 mg by mouth 2 (two) times a day        hydrOXYzine HCL (ATARAX) 25 mg tablet Take 25 mg by mouth every 6 (six) hours as needed for itching      levonorgestrel (MIRENA) 20 MCG/24HR IUD 20 mcg/day      methocarbamol (ROBAXIN) 500 mg tablet Take 500 mg by mouth 4 (four) times a day      Mirabegron ER (MYRBETRIQ) 50 MG TB24 Take by mouth daily      traMADol (ULTRAM) 50 mg tablet Take 50 mg by mouth every 6 (six) hours as needed for moderate pain      cephalexin (KEFLEX) 500 mg capsule Take 1 capsule (500 mg total) by mouth every 8 (eight) hours for 13 days 21 capsule 0     No current facility-administered medications for this visit            Health Maintenance     Health Maintenance   Topic Date Due    HIV SCREENING  1985    Depression Screening PHQ-9  10/13/1999    DTaP,Tdap,and Td Vaccines (2 - Td) 05/17/2016    COLONOSCOPY  12/12/2022    INFLUENZA VACCINE  Completed     Immunization History   Administered Date(s) Administered    Influenza Quadrivalent Preservative Free 3 years and older IM 01/18/2017, 10/03/2017    Influenza TIV (IM) 11/12/2015    Tdap 04/19/2016       Erika Velasco MD

## 2018-02-22 NOTE — ASSESSMENT & PLAN NOTE
Patient has a history of spinal cord injury with neurogenic bladder as well as foot drop  Has been to physical therapy in the past   Patient also sees Urology

## 2018-03-08 LAB
25(OH)D3 SERPL-MCNC: 33 NG/ML (ref 30–100)
ALBUMIN SERPL-MCNC: 4.1 G/DL (ref 3.6–5.1)
ALBUMIN/GLOB SERPL: 1.7 (CALC) (ref 1–2.5)
ALP SERPL-CCNC: 54 U/L (ref 33–115)
ALT SERPL-CCNC: 12 U/L (ref 6–29)
AST SERPL-CCNC: 13 U/L (ref 10–30)
BASOPHILS # BLD AUTO: 31 CELLS/UL (ref 0–200)
BASOPHILS NFR BLD AUTO: 0.8 %
BILIRUB SERPL-MCNC: 0.5 MG/DL (ref 0.2–1.2)
BUN SERPL-MCNC: 12 MG/DL (ref 7–25)
BUN/CREAT SERPL: NORMAL (CALC) (ref 6–22)
CALCIUM SERPL-MCNC: 9.1 MG/DL (ref 8.6–10.2)
CHLORIDE SERPL-SCNC: 104 MMOL/L (ref 98–110)
CO2 SERPL-SCNC: 28 MMOL/L (ref 20–31)
CREAT SERPL-MCNC: 0.55 MG/DL (ref 0.5–1.1)
EOSINOPHIL # BLD AUTO: 62 CELLS/UL (ref 15–500)
EOSINOPHIL NFR BLD AUTO: 1.6 %
ERYTHROCYTE [DISTWIDTH] IN BLOOD BY AUTOMATED COUNT: 12.1 % (ref 11–15)
GLOBULIN SER CALC-MCNC: 2.4 G/DL (CALC) (ref 1.9–3.7)
GLUCOSE SERPL-MCNC: 80 MG/DL (ref 65–99)
HCT VFR BLD AUTO: 39.3 % (ref 35–45)
HGB BLD-MCNC: 13.4 G/DL (ref 11.7–15.5)
LYMPHOCYTES # BLD AUTO: 1213 CELLS/UL (ref 850–3900)
LYMPHOCYTES NFR BLD AUTO: 31.1 %
MCH RBC QN AUTO: 32.1 PG (ref 27–33)
MCHC RBC AUTO-ENTMCNC: 34.1 G/DL (ref 32–36)
MCV RBC AUTO: 94.2 FL (ref 80–100)
MONOCYTES # BLD AUTO: 304 CELLS/UL (ref 200–950)
MONOCYTES NFR BLD AUTO: 7.8 %
NEUTROPHILS # BLD AUTO: 2289 CELLS/UL (ref 1500–7800)
NEUTROPHILS NFR BLD AUTO: 58.7 %
PLATELET # BLD AUTO: 208 THOUSAND/UL (ref 140–400)
PMV BLD REES-ECKER: 9.8 FL (ref 7.5–12.5)
POTASSIUM SERPL-SCNC: 4 MMOL/L (ref 3.5–5.3)
PROT SERPL-MCNC: 6.5 G/DL (ref 6.1–8.1)
RBC # BLD AUTO: 4.17 MILLION/UL (ref 3.8–5.1)
SL AMB EGFR AFRICAN AMERICAN: 146 ML/MIN/1.73M2
SL AMB EGFR NON AFRICAN AMERICAN: 126 ML/MIN/1.73M2
SODIUM SERPL-SCNC: 136 MMOL/L (ref 135–146)
TSH SERPL-ACNC: 1.05 MIU/L
VIT B12 SERPL-MCNC: 401 PG/ML (ref 200–1100)
WBC # BLD AUTO: 3.9 THOUSAND/UL (ref 3.8–10.8)

## 2018-03-09 NOTE — PROGRESS NOTES
Can you please let patient know that her blood work including blood sugar, kidneys, liver, thyroid was within normal range  Her vitamin-D was low normal and I would like her to take 2000 international units daily  Her vitamin B12 was also lower normal and I would like her to take  500 micrograms daily    Thank you

## 2018-04-02 RX ORDER — BUSPIRONE HYDROCHLORIDE 10 MG/1
1 TABLET ORAL 3 TIMES DAILY
COMMUNITY
Start: 2018-03-16

## 2018-04-03 ENCOUNTER — OFFICE VISIT (OUTPATIENT)
Dept: FAMILY MEDICINE CLINIC | Facility: CLINIC | Age: 33
End: 2018-04-03
Payer: COMMERCIAL

## 2018-04-03 VITALS
HEIGHT: 64 IN | TEMPERATURE: 97.9 F | BODY MASS INDEX: 21.92 KG/M2 | HEART RATE: 72 BPM | RESPIRATION RATE: 16 BRPM | DIASTOLIC BLOOD PRESSURE: 70 MMHG | WEIGHT: 128.4 LBS | SYSTOLIC BLOOD PRESSURE: 110 MMHG

## 2018-04-03 DIAGNOSIS — G89.29 CHRONIC THORACIC BACK PAIN, UNSPECIFIED BACK PAIN LATERALITY: Primary | ICD-10-CM

## 2018-04-03 DIAGNOSIS — S34.101S: ICD-10-CM

## 2018-04-03 DIAGNOSIS — F31.9 BIPOLAR AFFECTIVE DISORDER, REMISSION STATUS UNSPECIFIED (HCC): ICD-10-CM

## 2018-04-03 DIAGNOSIS — M54.6 CHRONIC THORACIC BACK PAIN, UNSPECIFIED BACK PAIN LATERALITY: Primary | ICD-10-CM

## 2018-04-03 DIAGNOSIS — K59.09 OTHER CONSTIPATION: ICD-10-CM

## 2018-04-03 DIAGNOSIS — Z78.9 VEGETARIAN DIET: ICD-10-CM

## 2018-04-03 DIAGNOSIS — E53.8 LOW VITAMIN B12 LEVEL: ICD-10-CM

## 2018-04-03 PROCEDURE — 99215 OFFICE O/P EST HI 40 MIN: CPT | Performed by: FAMILY MEDICINE

## 2018-04-03 NOTE — PROGRESS NOTES
FAMILY PRACTICE OFFICE VISIT       NAME: Rubén Barbosa  AGE: 29 y o  SEX: female       : 1985        MRN: [id number]    DATE: 2018  TIME: 3:35 PM    Assessment and Plan     Problem List Items Addressed This Visit     Constipation       Constipation is overall stable  Chronic thoracic back pain - Primary      Unclear etiology  Patient did complete PT at [facility] which helped mildly  She continues to experience pain  I feel she would benefit from an assessment by pain management  Patient is agreeable with this  Referral has been provided  Denies bowel or bladder incontinence  Relevant Orders    Ambulatory referral to Pain Management    Low vitamin B12 level       Patient maintains a vegetarian diet  She has started to take vitamin B12 supplementation  Will recheck vitamin B12 level  Relevant Orders    Vitamin B12    Vegetarian diet       Will continue to monitor vitamin B12 and iron  Relevant Orders    Iron    Ferritin    Injury to L1 level of spinal cord [facility])       Patient is doing well overall  She has a history of left foot drop as well as urinary urgency  Patient may have neurogenic bladder, has been evaluated by Urology and continues on Myrbetriq  Bipolar affective disorder ([facility] [address] )       Followed closely by Psychiatry at [facility] as well as a therapist weekly  Continue with BuSpar, carbamazepine, hydroxyzine  Relevant Medications    busPIRone (BUSPAR) 10 mg tablet        I spent 40 minutes with the patient with greater than 50 percent of time spent on counseling  Follow-up in 6 months or sooner if needed  Patient will keep an eye on her weight as well  There are no Patient Instructions on file for this visit  Chief Complaint     Chief Complaint   Patient presents with    Follow-up     Pt is here for 6 month follow up        History of Present Illness     HPI  Patient is here for routine follow-up of chronic conditions  Patient states she has had mid back pain for which she completed PT a few months ago at [facility] which helped minimally however continues to have pain which is located between shoulder blades, worse when she is sitting and standing for prolonged periods of time  Pain is also aggravated when she uses her arms for example while chopping vegetables or driving  Patient is left handed  Pain may travel to the shoulders  Pain is described as a burning aching  Was taking tramadol as needed  No sensation in rectal area due to h/o prior spinal cord injury  Denies bowel or bladder incontinence  Review of Systems   Review of Systems   Constitutional: Negative for appetite change  Eating healthy  Will be eating vegan   HENT: Negative  Eyes: Negative for visual disturbance  Respiratory: Negative for shortness of breath  Cardiovascular: Negative  Negative for chest pain, palpitations and leg swelling  Gastrointestinal: Negative for abdominal pain  Genitourinary: Negative for dysuria  Musculoskeletal: Positive for back pain  Psychiatric/Behavioral: Negative for dysphoric mood  H/o bipolar d/o       Active Problem List     Patient Active Problem List   Diagnosis    Paronychia of toe of right foot    Constipation    Weight loss    H/O spinal cord injury    Chronic thoracic back pain    Low vitamin B12 level    Vegetarian diet    Injury to L1 level of spinal cord (HCC)    Bipolar affective disorder (HCC)       Past Medical History:  Past Medical History:   Diagnosis Date    Anxiety     Depression     Foot drop     Spinal cord injury at C1-C4 level [facility])        Past Surgical History:  Past Surgical History:   Procedure Laterality Date    CA COLONOSCOPY FLX DX W/COLLJ SPEC WHEN PFRMD N/A 12/12/2017    Procedure: COLONOSCOPY;  Surgeon: Wiliam Koehler MD;  [facility]; Service: Gastroenterology    SPINAL FUSION         Family History:  History reviewed   No pertinent family history  Social History:  Social History     Social History    Marital status: Single     Spouse name: N/A    Number of children: N/A    Years of education: N/A     Occupational History    Not on file  Social History Main Topics    Smoking status: Former Smoker     Packs/day: 1 00     Quit date: 12/12/2016    Smokeless tobacco: Never Used    Alcohol use Yes      Comment: 2 wkly    Drug use: No    Sexual activity: Not on file     Other Topics Concern    Not on file     Social History Narrative    No narrative on file     I have reviewed the patient's medical history in detail; there are no changes to the history as noted in the electronic medical record  Objective     Vitals:    04/03/18 1328   BP: 110/70   Pulse: 72   Resp: 16   Temp: 97 9 °F (36 6 °C)     Wt Readings from Last 3 Encounters:   04/03/18 58 2 kg (128 lb 6 4 oz)   02/22/18 59 2 kg (130 lb 9 6 oz)   12/12/17 58 1 kg (128 lb)       Physical Exam   Constitutional: She is oriented to person, place, and time  She appears well-developed and well-nourished  HENT:   Head: Normocephalic and atraumatic  Mouth/Throat: Oropharynx is clear and moist    Eyes: Conjunctivae and EOM are normal  Pupils are equal, round, and reactive to light  Neck: Normal range of motion  Neck supple  No thyromegaly present  Cardiovascular: Normal rate, regular rhythm and normal heart sounds  No murmur heard  Pulmonary/Chest: Effort normal and breath sounds normal    Abdominal: Soft  Bowel sounds are normal  She exhibits no distension  There is no tenderness  Musculoskeletal: Normal range of motion  She exhibits no edema  Lymphadenopathy:     She has no cervical adenopathy  Neurological: She is alert and oriented to person, place, and time  Psychiatric: She has a normal mood and affect  Nursing note and vitals reviewed        Pertinent Laboratory/Diagnostic Studies:  Lab Results   Component Value Date    GLUCOSE 92 12/28/2015 BUN 12 03/06/2018    CREATININE 0 55 03/06/2018    CALCIUM 9 1 03/06/2018     06/21/2017    K 4 1 06/21/2017    CO2 27 06/21/2017     06/21/2017     Lab Results   Component Value Date    ALT 15 06/21/2017    AST 14 06/21/2017    ALKPHOS 72 06/21/2017    BILITOT 0 5 06/21/2017       Lab Results   Component Value Date    WBC 3 8 06/21/2017    HGB 13 4 03/06/2018    HCT 39 3 03/06/2018    MCV 94 2 03/06/2018     03/06/2018       No results found for: TSH    Lab Results   Component Value Date    CHOL 181 08/23/2014     Lab Results   Component Value Date    TRIG 112 08/23/2014     Lab Results   Component Value Date    HDL 73 08/23/2014     Lab Results   Component Value Date    LDLCALC 86 08/23/2014     Lab Results   Component Value Date    HGBA1C 4 8 02/01/2017       Results for orders placed or performed in visit on 03/06/18   Comprehensive metabolic panel   Result Value Ref Range    SL AMB GLUCOSE 80 65 - 99 mg/dL    BUN 12 7 - 25 mg/dL    Creatinine, Serum 0 55 0 50 - 1 10 mg/dL    eGFR Non  126 > OR = 60 mL/min/1 73m2    SL AMB EGFR  146 > OR = 60 mL/min/1 73m2    SL AMB BUN/CREATININE RATIO NOT APPLICABLE 6 - 22 (calc)    SL AMB SODIUM 136 135 - 146 mmol/L    SL AMB POTASSIUM 4 0 3 5 - 5 3 mmol/L    SL AMB CHLORIDE 104 98 - 110 mmol/L    SL AMB CARBON DIOXIDE 28 20 - 31 mmol/L    SL AMB CALCIUM 9 1 8 6 - 10 2 mg/dL    SL AMB PROTEIN, TOTAL 6 5 6 1 - 8 1 g/dL    Serum Albumin 4 1 3 6 - 5 1 g/dL    SL AMB GLOBULIN 2 4 1 9 - 3 7 g/dL (calc)    SL AMB ALBUMIN/GLOBULIN RATIO 1 7 1 0 - 2 5 (calc)    SL AMB BILIRUBIN, TOTAL 0 5 0 2 - 1 2 mg/dL    SL AMB ALKALINE PHOSPHATASE 54 33 - 115 U/L    SL AMB AST 13 10 - 30 U/L    SL AMB ALT 12 6 - 29 U/L   CBC and differential   Result Value Ref Range    SL AMB LAB WHITE BLOOD CELL COUNT 3 9 3 8 - 10 8 Thousand/uL    SL AMB LAB RED BLOOD CELLS 4 17 3 80 - 5 10 Million/uL    Hemoglobin 13 4 11 7 - 15 5 g/dL    Hematocrit 39 3 35 0 - 45 0 %    MCV 94 2 80 0 - 100 0 fL    MCH 32 1 27 0 - 33 0 pg    MCHC 34 1 32 0 - 36 0 g/dL    RDW 12 1 11 0 - 15 0 %    Platelet Count 826 229 - 400 Thousand/uL    SL AMB MPV 9 8 7 5 - 12 5 fL    Neutrophils (Absolute) 2,289 1,500 - 7,800 cells/uL    Lymphocytes (Absolute) 1,213 850 - 3,900 cells/uL    Monocytes (Absolute) 304 200 - 950 cells/uL    Eosinophils (Absolute) 62 15 - 500 cells/uL    Basophils (Absolute) 31 0 - 200 cells/uL    Neutrophils 58 7 %    Lymphocytes 31 1 %    Monocytes 7 8 %    Eosinophils 1 6 %    Basophils 0 8 %   Vitamin B12   Result Value Ref Range    Vitamin B-12 401 200 - 1,100 pg/mL   Vitamin D 25 hydroxy   Result Value Ref Range    Vitamin D, 25-Hydroxy, Serum 33 30 - 100 ng/mL   TSH, 3rd generation with T4 reflex   Result Value Ref Range    SL AMB TSH W/ REFLEX TO FREE T4 1 05 mIU/L       Orders Placed This Encounter   Procedures    Vitamin B12    Iron    Ferritin    Ambulatory referral to Pain Management       ALLERGIES:  No Known Allergies    Current Medications     Current Outpatient Prescriptions   Medication Sig Dispense Refill    busPIRone (BUSPAR) 10 mg tablet Take 1 tablet by mouth 3 (three) times a day      carBAMazepine (TEGretol) 200 mg tablet Take 200 mg by mouth 2 (two) times a day        hydrOXYzine HCL (ATARAX) 25 mg tablet Take 25 mg by mouth every 6 (six) hours as needed for itching      levonorgestrel (MIRENA) 20 MCG/24HR IUD 20 mcg/day      methocarbamol (ROBAXIN) 500 mg tablet Take 500 mg by mouth as needed        Mirabegron ER (MYRBETRIQ) 50 MG TB24 Take by mouth daily      traMADol (ULTRAM) 50 mg tablet Take 50 mg by mouth every 6 (six) hours as needed for moderate pain       No current facility-administered medications for this visit            Health Maintenance     Health Maintenance   Topic Date Due    HIV SCREENING  1985    Depression Screening PHQ-9  10/13/1999    COLONOSCOPY  12/12/2022    DTaP,Tdap,and Td Vaccines (2 - Td) 04/19/2026  INFLUENZA VACCINE  Completed     Immunization History   Administered Date(s) Administered    Influenza Quadrivalent Preservative Free 3 years and older IM 01/18/2017, 10/03/2017    Influenza TIV (IM) 11/12/2015    Tdap 04/19/2016       Leeanna Woods MD

## 2018-04-04 PROBLEM — M54.6 CHRONIC THORACIC BACK PAIN: Status: ACTIVE | Noted: 2018-04-04

## 2018-04-04 PROBLEM — S34.101A: Status: ACTIVE | Noted: 2018-04-04

## 2018-04-04 PROBLEM — R79.89 LOW VITAMIN B12 LEVEL: Status: ACTIVE | Noted: 2018-04-04

## 2018-04-04 PROBLEM — Z78.9 VEGETARIAN DIET: Status: ACTIVE | Noted: 2018-04-04

## 2018-04-04 PROBLEM — G89.29 CHRONIC THORACIC BACK PAIN: Status: ACTIVE | Noted: 2018-04-04

## 2018-04-04 PROBLEM — F31.9 BIPOLAR AFFECTIVE DISORDER (HCC): Status: ACTIVE | Noted: 2018-04-04

## 2018-04-04 PROBLEM — E53.8 LOW VITAMIN B12 LEVEL: Status: ACTIVE | Noted: 2018-04-04

## 2018-04-04 NOTE — ASSESSMENT & PLAN NOTE
Patient maintains a vegetarian diet  She has started to take vitamin B12 supplementation  Will recheck vitamin B12 level

## 2018-04-04 NOTE — ASSESSMENT & PLAN NOTE
[facility] as a therapist weekly  Continue with BuSpar, carbamazepine, hydroxyzine

## 2018-04-04 NOTE — ASSESSMENT & PLAN NOTE
Unclear etiology  [facility]  She continues to experience pain  I feel she would benefit from an assessment by pain management  Patient is agreeable with this  Referral has been provided  Denies bowel or bladder incontinence

## 2018-04-04 NOTE — ASSESSMENT & PLAN NOTE
Patient is doing well overall  She has a history of left foot drop as well as urinary urgency  Patient may have neurogenic bladder, has been evaluated by Urology and continues on Myrbetriq

## 2018-05-16 NOTE — PROGRESS NOTES
5/21/2018    Rohini Palacios  1985  [id number]        Assessment  -Urinary urgency~possible neurogenic bladder    Discussion/Plan  Gracia Pal is a 29 y o  female being managed by Dr Mandi Rushing         -Based on patient's acute change in urinary symptoms, will send today's urine specimen for UA and culture to rule out infection  We discussed that based on results, will consider adding Oxybutynin with Myrbetriq  We also discussed at great lengths, other options such as PTNS, botox injection, and InterStim  At this time patient would like to manage with medication, but expressed interest in PTNS  -Follow up in 2-3 months with PVR and to reevaluate symptoms  -All questions answered, patients agree with plan     History of Present Illness  29 y o  female with a history of urinary urgency presents today for follow up  Patient recently seen in office for persistent urinary urgency, and was provided Myrbetriq 50mg daily  She states that Myrbetriq sightly helped with symptoms  She unfortunately sustained neurologic injury secondary to fall 3 years ago  She has had subsequent foot drop and neuropathy  Patient is s/p negative cystoscopy by Dr Mandi Rushing on 1/2017  Patient states that 1 week ago, she noticed increased frequency of urination, and was  incontinent every night  She continues to wear sanitary briefs daily  Patient unsure if she has UTI  She denies any gross hematuria or dysuria  Review of Systems  Review of Systems   Constitutional: Negative  HENT: Negative  Respiratory: Negative  Cardiovascular: Negative  Gastrointestinal: Negative  Genitourinary: Positive for frequency and urgency  Negative for decreased urine volume, difficulty urinating, dysuria, flank pain and hematuria  Musculoskeletal: Negative  Skin: Negative  Neurological: Negative  Psychiatric/Behavioral: Negative          Past Medical History  Past Medical History:   Diagnosis Date    Anxiety     Depression  Foot drop     Spinal cord injury at C1-C4 level [facility])        Past Social History  Past Surgical History:   Procedure Laterality Date    AL COLONOSCOPY FLX DX W/COLLJ SPEC WHEN PFRMD N/A 12/12/2017    Procedure: COLONOSCOPY;  Surgeon: Wiliam Koehler MD;  [facility]; Service: Gastroenterology    SPINAL FUSION         Past Family History  Family History   Problem Relation Age of Onset    Diabetes Father     Lymphoma Father        Past Social history  Social History     Social History    Marital status: Single     Spouse name: N/A    Number of children: N/A    Years of education: N/A     Occupational History    Not on file  Social History Main Topics    Smoking status: Former Smoker     Packs/day: 1 00     Quit date: 12/12/2016    Smokeless tobacco: Never Used    Alcohol use Yes      Comment: 2 wkly    Drug use: No    Sexual activity: Not on file     Other Topics Concern    Not on file     Social History Narrative    No narrative on file       Current Medications  Current Outpatient Prescriptions   Medication Sig Dispense Refill    busPIRone (BUSPAR) 10 mg tablet Take 1 tablet by mouth 3 (three) times a day      carBAMazepine (TEGretol) 200 mg tablet Take 200 mg by mouth 2 (two) times a day        levonorgestrel (MIRENA) 20 MCG/24HR IUD 20 mcg/day      methocarbamol (ROBAXIN) 500 mg tablet Take 500 mg by mouth as needed        Mirabegron ER (MYRBETRIQ) 50 MG TB24 Take by mouth daily      oxyCODONE (ROXICODONE) 5 mg immediate release tablet TAKE 1 TO 2 TABLETS BY MOUTH EVERY 12 HOURS FOR SEVERE PAIN  0    hydrOXYzine HCL (ATARAX) 25 mg tablet Take 25 mg by mouth every 6 (six) hours as needed for itching      traMADol (ULTRAM) 50 mg tablet Take 50 mg by mouth every 6 (six) hours as needed for moderate pain       No current facility-administered medications for this visit          Allergies  No Known Allergies    Past medical history, social history, family history, medications and allergies were reviewed  Vitals  Vitals:    05/21/18 1258   BP: 100/70   BP Location: Left arm   Patient Position: Sitting   Cuff Size: Adult   Pulse: 76   Weight: 58 3 kg (128 lb 8 oz)   Height: 5' 4" (1 626 m)       Physical Exam  Physical Exam   Constitutional: She is oriented to person, place, and time  She appears well-developed and well-nourished  HENT:   Head: Normocephalic  Eyes: Pupils are equal, round, and reactive to light  Neck: Normal range of motion  Cardiovascular: Normal rate and regular rhythm  Pulmonary/Chest: Effort normal    Abdominal: Soft  Normal appearance  There is no CVA tenderness  Musculoskeletal: Normal range of motion  Gait unsteady, walks with cane     Neurological: She is alert and oriented to person, place, and time  She has normal reflexes  Skin: Skin is warm and dry  Psychiatric: She has a normal mood and affect   Her behavior is normal  Judgment and thought content normal        Results    I have personally reviewed all pertinent lab results and reviewed with patient  Lab Results   Component Value Date    GLUCOSE 92 12/28/2015    CALCIUM 9 1 03/06/2018     06/21/2017    K 4 1 06/21/2017    CO2 27 06/21/2017     06/21/2017    BUN 12 03/06/2018    CREATININE 0 55 03/06/2018     Lab Results   Component Value Date    WBC 3 8 06/21/2017    HGB 13 4 03/06/2018    HCT 39 3 03/06/2018    MCV 94 2 03/06/2018     03/06/2018     Recent Results (from the past 1 hour(s))   POCT urine dip    Collection Time: 05/21/18  1:08 PM   Result Value Ref Range    LEUKOCYTE ESTERASE,UA moderate      NITRITE,UA -     SL AMB POCT UROBILINOGEN 0 2     SL AMB POCT URINE PROTEIN -      PH,UA 8 0      BLOOD,UA -      SPECIFIC GRAVITY,UA 1 010      KETONES,UA -      BILIRUBIN,UA -     GLUCOSE, UA -      COLOR,UA yellow      CLARITY,UA clear

## 2018-05-21 ENCOUNTER — OFFICE VISIT (OUTPATIENT)
Dept: UROLOGY | Facility: AMBULATORY SURGERY CENTER | Age: 33
End: 2018-05-21
Payer: COMMERCIAL

## 2018-05-21 VITALS
HEIGHT: 64 IN | DIASTOLIC BLOOD PRESSURE: 70 MMHG | WEIGHT: 128.5 LBS | BODY MASS INDEX: 21.94 KG/M2 | HEART RATE: 76 BPM | SYSTOLIC BLOOD PRESSURE: 100 MMHG

## 2018-05-21 DIAGNOSIS — R39.15 URINARY URGENCY: ICD-10-CM

## 2018-05-21 DIAGNOSIS — R35.0 URINARY FREQUENCY: Primary | ICD-10-CM

## 2018-05-21 LAB
BACTERIA UR QL AUTO: ABNORMAL /HPF
BILIRUB UR QL STRIP: NEGATIVE
CLARITY UR: CLEAR
COLOR UR: YELLOW
GLUCOSE UR STRIP-MCNC: NEGATIVE MG/DL
HGB UR QL STRIP.AUTO: NEGATIVE
HYALINE CASTS #/AREA URNS LPF: ABNORMAL /LPF
KETONES UR STRIP-MCNC: NEGATIVE MG/DL
LEUKOCYTE ESTERASE UR QL STRIP: ABNORMAL
NITRITE UR QL STRIP: NEGATIVE
NON-SQ EPI CELLS URNS QL MICRO: ABNORMAL /HPF
PH UR STRIP.AUTO: 7.5 [PH] (ref 4.5–8)
PROT UR STRIP-MCNC: NEGATIVE MG/DL
RBC #/AREA URNS AUTO: ABNORMAL /HPF
SL AMB  POCT GLUCOSE, UA: ABNORMAL
SL AMB LEUKOCYTE ESTERASE,UA: ABNORMAL
SL AMB POCT BILIRUBIN,UA: ABNORMAL
SL AMB POCT BLOOD,UA: ABNORMAL
SL AMB POCT CLARITY,UA: CLEAR
SL AMB POCT COLOR,UA: YELLOW
SL AMB POCT KETONES,UA: ABNORMAL
SL AMB POCT NITRITE,UA: ABNORMAL
SL AMB POCT PH,UA: 8
SL AMB POCT SPECIFIC GRAVITY,UA: 1.01
SL AMB POCT URINE PROTEIN: ABNORMAL
SL AMB POCT UROBILINOGEN: 0.2
SP GR UR STRIP.AUTO: 1.02 (ref 1–1.03)
UROBILINOGEN UR QL STRIP.AUTO: 0.2 E.U./DL
WBC #/AREA URNS AUTO: ABNORMAL /HPF

## 2018-05-21 PROCEDURE — 81001 URINALYSIS AUTO W/SCOPE: CPT | Performed by: NURSE PRACTITIONER

## 2018-05-21 PROCEDURE — 81002 URINALYSIS NONAUTO W/O SCOPE: CPT | Performed by: NURSE PRACTITIONER

## 2018-05-21 PROCEDURE — 87086 URINE CULTURE/COLONY COUNT: CPT | Performed by: NURSE PRACTITIONER

## 2018-05-21 PROCEDURE — 99213 OFFICE O/P EST LOW 20 MIN: CPT | Performed by: NURSE PRACTITIONER

## 2018-05-21 RX ORDER — OXYCODONE HYDROCHLORIDE 5 MG/1
TABLET ORAL
Refills: 0 | COMMUNITY
Start: 2018-05-09 | End: 2018-08-14

## 2018-05-22 ENCOUNTER — TELEPHONE (OUTPATIENT)
Dept: UROLOGY | Facility: AMBULATORY SURGERY CENTER | Age: 33
End: 2018-05-22

## 2018-05-22 NOTE — TELEPHONE ENCOUNTER
Please inform patient that results of recent urine culture were negative for infection  No additional testing needed, will see patient in follow up on 7/24/18

## 2018-05-23 LAB — BACTERIA UR CULT: NORMAL

## 2018-05-29 ENCOUNTER — TELEPHONE (OUTPATIENT)
Dept: UROLOGY | Facility: CLINIC | Age: 33
End: 2018-05-29

## 2018-05-29 DIAGNOSIS — N32.81 OAB (OVERACTIVE BLADDER): Primary | ICD-10-CM

## 2018-05-29 RX ORDER — OXYBUTYNIN CHLORIDE 5 MG/1
5 TABLET, EXTENDED RELEASE ORAL DAILY
Qty: 30 TABLET | Refills: 11 | Status: SHIPPED | OUTPATIENT
Start: 2018-05-29 | End: 2019-04-22

## 2018-05-29 NOTE — TELEPHONE ENCOUNTER
----- Message from Tata Aguilar sent at 5/28/2018 10:16 AM EDT -----  Regarding: RE:Your Recent Visit  Contact: [phone]  Hi,   I believe you said if my urinalysis came back clean you would call in oxybutynin to my pharmacy to supplement my myrbetriq  I havent heard anything from my pharmacy  Let me know if there is an issue  Thanks  ----- Message -----  From: EUSEBIA Kohli  Sent: 5/21/2018  1:40 PM EDT  To: Tata Aguilar  Subject: Your Recent Visit  Tata Aguilar, you have a new After Visit Summary in [address]  Please click on visits and then your most recent appointment, to view your After Visit Summary  If you are experiencing any technical issues please call our patient service desk at [phone]) option [phone]

## 2018-05-29 NOTE — TELEPHONE ENCOUNTER
Spoke with patient  Patient made aware of addition of 5 mg Oxybutynin daily with current Myrbetriq prescription

## 2018-05-29 NOTE — TELEPHONE ENCOUNTER
UA and culture negative for any infection  Oxybutynin 5mg sent to patient's pharmacy, as discussed at last OV for persistent urinary urgency  Prescription sent to patient's [facility] pharmacy  Will take oxybutynin with current Myrbetriq

## 2018-06-05 ENCOUNTER — CLINICAL SUPPORT (OUTPATIENT)
Dept: PAIN MEDICINE | Facility: CLINIC | Age: 33
End: 2018-06-05
Payer: COMMERCIAL

## 2018-06-05 VITALS
BODY MASS INDEX: 21.17 KG/M2 | DIASTOLIC BLOOD PRESSURE: 74 MMHG | WEIGHT: 124 LBS | HEIGHT: 64 IN | SYSTOLIC BLOOD PRESSURE: 116 MMHG | TEMPERATURE: 99.2 F | HEART RATE: 69 BPM

## 2018-06-05 DIAGNOSIS — Z87.828 H/O SPINAL CORD INJURY: ICD-10-CM

## 2018-06-05 DIAGNOSIS — S34.101S: ICD-10-CM

## 2018-06-05 DIAGNOSIS — M79.18 MYOFASCIAL PAIN: Primary | ICD-10-CM

## 2018-06-05 DIAGNOSIS — M54.6 CHRONIC THORACIC BACK PAIN, UNSPECIFIED BACK PAIN LATERALITY: ICD-10-CM

## 2018-06-05 DIAGNOSIS — M54.16 LUMBAR RADICULOPATHY: ICD-10-CM

## 2018-06-05 DIAGNOSIS — G89.29 CHRONIC THORACIC BACK PAIN, UNSPECIFIED BACK PAIN LATERALITY: ICD-10-CM

## 2018-06-05 PROCEDURE — 99204 OFFICE O/P NEW MOD 45 MIN: CPT | Performed by: ANESTHESIOLOGY

## 2018-06-05 RX ORDER — CYCLOBENZAPRINE HCL 5 MG
10 TABLET ORAL 3 TIMES DAILY PRN
Qty: 180 TABLET | Refills: 2 | Status: SHIPPED | OUTPATIENT
Start: 2018-06-05 | End: 2018-08-14

## 2018-06-05 NOTE — PROGRESS NOTES
Assessment:  1  Myofascial pain    2  Chronic thoracic back pain, unspecified back pain laterality    3  Injury to L1 level of spinal cord, sequela ([address] )    4  H/O spinal cord injury    5  Lumbar radiculopathy        Plan:  30-year-old female with a history of partial L1 spinal cord injury following a fall from a tree with T12-L2 fusion an L1 corpectomy roughly 5 years ago presenting for initial consultation regarding chronic axial thoracic back pain centered between her scapula  The pain seems to be largely myofascial in nature  X-ray of her thoracic spine does not reveal any significant degenerative changes are spondylosis  She does not have any fractures or malalignment of the thoracic spine  She does not have any radicular symptoms into her thorax or abdomen  The patient does have chronic foot drop bilaterally and neurogenic bladder since her spinal cord injury  The patient has chronic radicular symptoms into her lower extremities are essentially at baseline  She denies any recent trauma or inciting event to her thoracic region and has been dealing with the symptoms for the past 2 years  The patient has done physical therapy with mild-to-moderate improvement  She has been taking methocarbamol and oxycodone p r n  with minimal relief  1   The patient will continue with physical therapy as prescribed and if she plateaus may consider a trial of aquatic therapy  2  I will discontinue methocarbamol and trial cyclobenzaprine 5 mg 1-2 tablets Q 8 hr p r n  for myofascial pain  3  I will trial diclofenac 50 mg b i d  p r n   4   May consider trigger point injections into the bilateral rhomboids and thoracic paraspinal musculature if she fails conservative therapy  5    I will follow up the patient in 3 months      [place] Prescription Drug Monitoring Program report was reviewed and was appropriate       My impressions and treatment recommendations were discussed in detail with the patient who verbalized understanding and had no further questions  Discharge instructions were provided  I personally saw and examined the patient and I agree with the above discussed plan of care  No orders of the defined types were placed in this encounter  No orders of the defined types were placed in this encounter  History of Present Illness:    Alfreda Israel is a 29 y o  female  with a history of partial L1 spinal cord injury following a fall from a tree with T12-L2 fusion an L1 corpectomy roughly 5 years ago presenting for initial consultation regarding chronic axial thoracic back pain centered between her scapula  She denies any radiation of the pain into her thorax or abdomen  The patient does have some baseline bilateral foot drop and sensory disturbance of her feet since her spinal cord injury which is essentially unchanged  She also has some baseline urinary incontinence which is also unchanged and the patient does follow with Neurology for this  The patient does have an MRI of her lumbar spine revealing stable hardware from T12-L2  There was mild central stenosis at L5-S1 with a small disc protrusion and some spondylosis noted at L4-5 and L5-S1  X-ray of the thoracic spine was unremarkable  The patient has done physical therapy with mild-to-moderate improvement  She has been taking methocarbamol 500 mg q 8 hours p r n  without any relief  She does take oxycodone 5 mg p r n  with mild relief  She does take ibuprofen and Tylenol p r n  with minimal relief  The patient rates her pain as 7/10 on the pain is constant  The pain is worse in the morning and evening  The pain is described as burning, dull, aching, and pinching  The pain is decreased with lying down  The pain is increased with standing, bending, sitting, walking, exercise, and menstruation  She has got moderate relief with heat and ice application and exercise      I have personally reviewed and/or updated the patient's past medical history, past surgical history, family history, social history, current medications, allergies, and vital signs today  Other than as stated above, the patient denies any interval changes in medications, medical condition, mental condition, symptoms, or allergies since the last office visit  Review of Systems:    Review of Systems   Constitutional: Negative for fever and unexpected weight change  HENT: Negative for trouble swallowing  Eyes: Negative for visual disturbance  Respiratory: Negative for shortness of breath and wheezing  Cardiovascular: Negative for chest pain and palpitations  Gastrointestinal: Negative for constipation, diarrhea, nausea and vomiting  Endocrine: Negative for cold intolerance, heat intolerance and polydipsia  Genitourinary: Negative for difficulty urinating and frequency  Musculoskeletal: Negative for arthralgias, gait problem, joint swelling and myalgias  Joint stiffness    Skin: Negative for rash  Neurological: Positive for weakness (Muscle )  Negative for dizziness, seizures, syncope and headaches  Hematological: Does not bruise/bleed easily  Psychiatric/Behavioral: Negative for dysphoric mood  All other systems reviewed and are negative        Patient Active Problem List   Diagnosis    Paronychia of toe of right foot    Constipation    Weight loss    H/O spinal cord injury    Chronic thoracic back pain    Low vitamin B12 level    Vegetarian diet    Injury to L1 level of spinal cord (HCC)    Bipolar affective disorder (HCC)       Past Medical History:   Diagnosis Date    Anemia     Anxiety     Depression     Foot drop     Mental disorder     Small bowel problem     Spinal cord injury at C1-C4 level [facility])        Past Surgical History:   Procedure Laterality Date    CYSTOSCOPY      Diagnostic; onset: 1/3/17    PA COLONOSCOPY FLX DX W/COLLJ SPEC WHEN PFRMD N/A 12/12/2017    Procedure: COLONOSCOPY;  Surgeon: Chuy Harmon MD; Location[facility]; Service: Gastroenterology    SPINAL FUSION         Family History   Problem Relation Age of Onset    Diabetes Father     Lymphoma Father      Waldenstrom's    Heart disease Father      cardiac disorder    Hypertension Father     Pulmonary embolism Father     Hyperlipidemia Father     Stroke Family        Social History     Occupational History    Not on file  Social History Main Topics    Smoking status: Former Smoker     Packs/day: 1 00     Quit date: 12/12/2016    Smokeless tobacco: Never Used      Comment: current every day smoker, former smoker and smokes less than 1 ppd as per [employer]    Alcohol use Yes      Comment: 2 wkly    Drug use: No      Comment: Drug use as per [employer]    Sexual activity: Not on file       Current Outpatient Prescriptions on File Prior to Visit   Medication Sig    busPIRone (BUSPAR) 10 mg tablet Take 1 tablet by mouth 3 (three) times a day    carBAMazepine (TEGretol) 200 mg tablet Take 200 mg by mouth 2 (two) times a day      hydrOXYzine HCL (ATARAX) 25 mg tablet Take 25 mg by mouth every 6 (six) hours as needed for itching    levonorgestrel (MIRENA) 20 MCG/24HR IUD 20 mcg/day    methocarbamol (ROBAXIN) 500 mg tablet Take 500 mg by mouth as needed      Mirabegron ER (MYRBETRIQ) 50 MG TB24 Take by mouth daily    oxybutynin (DITROPAN-XL) 5 mg 24 hr tablet Take 1 tablet (5 mg total) by mouth daily    oxyCODONE (ROXICODONE) 5 mg immediate release tablet TAKE 1 TO 2 TABLETS BY MOUTH EVERY 12 HOURS FOR SEVERE PAIN    [DISCONTINUED] traMADol (ULTRAM) 50 mg tablet Take 50 mg by mouth every 6 (six) hours as needed for moderate pain     No current facility-administered medications on file prior to visit          No Known Allergies    Physical Exam:    /74   Pulse 69   Temp 99 2 °F (37 3 °C)   Ht 5' 4" (1 626 m)   Wt 56 2 kg (124 lb)   BMI 21 28 kg/m²     Constitutional: normal, well developed, well nourished, alert, in no distress and non-toxic and no overt pain behavior  Eyes: anicteric  HEENT: grossly intact  Neck: supple, symmetric, trachea midline and no masses   Pulmonary:even and unlabored  Cardiovascular:No edema or pitting edema present  Skin:Normal without rashes or lesions and well hydrated  Psychiatric:Mood and affect appropriate  Neurologic:Cranial Nerves II-XII grossly intact  Musculoskeletal:  Bilateral thoracic paraspinal musculature and rhomboids tender to palpation and ropy in texture from about T3-T7  Well-healed thoracolumbar incision in the midline  Bilateral patellar reflexes 2/4 and symmetrical   Bilateral Achilles reflexes unable to be elicited  No clonus was noted bilaterally  Bilateral dorsiflexion and EHL was 3/5  Bilateral inversion, eversion, and plantar flexion was 1/5  Bilateral knee extension and flexion was 5/5  Left hip flexion extension abduction and abduction was 5/5  Right hip flexion was 4/5  Right hip extension abduction and abduction was 5/5  Negative straight leg raise bilaterally  Negative Tu's test bilaterally  Bilateral lumbar paraspinal musculature nontender to palpation  Imaging:     MRI Lumbar spine dated 1/08/2017    FINDINGS:     ALIGNMENT:  Patient is status post L1 corpectomy and fusion from T12 through L2      MARROW SIGNAL:  Normal marrow signal is identified within the visualized bony structures  No discrete marrow lesion      DISTAL CORD AND CONUS:  Normal size and signal within the distal cord and conus  The conus ends at the L1-L2 level      PARASPINAL SOFT TISSUES:  Paraspinal soft tissues are unremarkable      SACRUM:  Normal signal within the sacrum  No evidence of insufficiency or stress fracture      LOWER THORACIC DISC SPACES:  T12-L1: There is a bandlike area of hypointensity in the AP plane which appears to bisect the thecal sac distal spinal cord where myelomalacia is noted  The cord terminates at the L1-2 level    This may represent a focus of scarring in the setting of prior trauma      LUMBAR DISC SPACES:          P7-S4:  Metallic susceptibility artifact noted  No greater than mild central or foraminal narrowing      L2-L3:  Normal      L3-L4:  No central or foraminal narrowing      L4-L5:  Mild facet hypertrophy  Minimal annular bulge  No significant central or foraminal narrowing      L5-S1:  Small central protrusion type disc herniation with mild facet hypertrophy  Mild central stenosis  Foramina are patent      IMPRESSION:     Postsurgical changes of fusion spanning T12-L2 with L1 corpectomy status post traumatic  A linear hypointense line traverses the thecal sac and distal CORD at the T12-L1 disc level likely scarring from prior surgery  There is cord abnormality   identified likely myelomalacia from prior injury  If prior MRIs are available, an addendum can be added to this report  X-Ray Thoracic spine dated 2/23/2017    FINDINGS:     Thoracic vertebrae demonstrate normal stature and alignment       Partially visualized is orthopedic hardware in T12, and the lumbar spine      There is no fracture or pathologic bone lesion       There is no displacement of the paraspinal line       The pedicles are intact      IMPRESSION:     No acute osseous injury to the thoracic spine

## 2018-06-12 ENCOUNTER — TELEPHONE (OUTPATIENT)
Dept: PAIN MEDICINE | Facility: MEDICAL CENTER | Age: 33
End: 2018-06-12

## 2018-06-12 NOTE — TELEPHONE ENCOUNTER
Pt is calling asking if Dr Janine Arredondo can consider giving her the steroid injections, pt has not yet started PT but she states she has done PT in past and it did not work  Pt states she is in a lot of pain   She can be reached at [phone]

## 2018-06-19 ENCOUNTER — CLINICAL SUPPORT (OUTPATIENT)
Dept: PAIN MEDICINE | Facility: CLINIC | Age: 33
End: 2018-06-19
Payer: COMMERCIAL

## 2018-06-19 VITALS
WEIGHT: 126 LBS | BODY MASS INDEX: 21.63 KG/M2 | TEMPERATURE: 99 F | SYSTOLIC BLOOD PRESSURE: 114 MMHG | HEART RATE: 90 BPM | DIASTOLIC BLOOD PRESSURE: 70 MMHG

## 2018-06-19 DIAGNOSIS — M54.6 CHRONIC BILATERAL THORACIC BACK PAIN: ICD-10-CM

## 2018-06-19 DIAGNOSIS — G89.29 CHRONIC BILATERAL THORACIC BACK PAIN: ICD-10-CM

## 2018-06-19 DIAGNOSIS — M79.18 MYOFASCIAL PAIN: Primary | ICD-10-CM

## 2018-06-19 PROCEDURE — 20552 NJX 1/MLT TRIGGER POINT 1/2: CPT | Performed by: ANESTHESIOLOGY

## 2018-06-19 NOTE — PROGRESS NOTES
Pt is c/o of upper back pain  Assessment:  1  Myofascial pain    2  Chronic bilateral thoracic back pain        Plan:  80-year-old female with a history of partial L1 spinal cord injury following a fall from a tree with T12-L2 fusion and L1 corpectomy returning for chronic axial thoracic back pain centered between her scapula secondary to myofascial pain for trigger point injections  After discussing the risks, benefits, and alternatives to the procedure, the patient expressed understanding and wished to proceed  Procedural pause conducted to verify:  correct patient identity, procedure to be performed and as applicable, correct side and site, correct patient position, and availability of implants, special equipment and special requirements  The appropriate hyper irritable musculoskeletal tender points were marked with a sterile pen, prepped with alcohol and sterilely draped  After appropriate reproduction of pain at each of the tender points marked, a total of 10 mL of 0 25% bupivacaine and 40 mg of Depo-Medrol was injected after negative aspiration of air, CSF, heme, or other bodily fluids with a 1-1/2 inch 25-gauge needle  A total number of 2 muscle groups were injected  The patient was discharged with no apparent complications on their own power after an appropriate observation period  Complete risks and benefits including bleeding, infection, tissue reaction, nerve injury and allergic reaction were discussed  The approach was demonstrated using models and literature was provided  Verbal and written consent was obtained  History of Present Illness: The patient is a 29 y o  female with a history of partial L1 spinal cord injury following a fall from a tree with T12-L2 fusion and L1 corpectomy returning for chronic axial thoracic back pain centered between her scapula secondary to myofascial pain for trigger point injections      The patient rates her pain a 5/10 on the pain is worse in the morning and at night  The pain is constant and described as burning, dull, aching, and crushing  The pain is worse with bending and twisting and exercise  The pain is alleviated somewhat with relaxation  I have personally reviewed and/or updated the patient's past medical history, past surgical history, family history, social history, current medications, allergies, and vital signs today  Other than as stated above, the patient denies any interval changes in medications, medical condition, mental condition, symptoms, or allergies since the last office visit  Review of Systems  Review of Systems   Respiratory: Negative for shortness of breath  Cardiovascular: Negative for chest pain  Gastrointestinal: Negative for constipation, diarrhea, nausea and vomiting  Musculoskeletal: Negative for arthralgias, gait problem, joint swelling and myalgias  Decreased rom  Difficulty walking  Joint stiffness   Skin: Negative for rash  Neurological: Negative for dizziness, seizures and weakness  All other systems reviewed and are negative  Past Medical History:   Diagnosis Date    Anemia     Anxiety     Depression     Foot drop     Mental disorder     Small bowel problem     Spinal cord injury at C1-C4 level [facility])        Past Surgical History:   Procedure Laterality Date    CYSTOSCOPY      Diagnostic; onset: 1/3/17    ID COLONOSCOPY FLX DX W/COLLJ SPEC WHEN PFRMD N/A 12/12/2017    Procedure: COLONOSCOPY;  Surgeon: Irwin Santana MD;  [facility]; Service: Gastroenterology    SPINAL FUSION         Family History   Problem Relation Age of Onset    Diabetes Father     Lymphoma Father         Waldenstrom's    Heart disease Father         cardiac disorder    Hypertension Father     Pulmonary embolism Father     Hyperlipidemia Father     Stroke Family        Social History     Occupational History    Not on file       Social History Main Topics    Smoking status: Former Smoker     Packs/day: 1 00     Quit date: 12/12/2016    Smokeless tobacco: Never Used      Comment: current every day smoker, former smoker and smokes less than 1 ppd as per [employer]    Alcohol use Yes      Comment: 2 wkly    Drug use: No      Comment: Drug use as per [employer]    Sexual activity: Not on file         Current Outpatient Prescriptions:     busPIRone (BUSPAR) 10 mg tablet, Take 1 tablet by mouth 3 (three) times a day, Disp: , Rfl:     carBAMazepine (TEGretol) 200 mg tablet, Take 200 mg by mouth 2 (two) times a day  , Disp: , Rfl:     cyclobenzaprine (FLEXERIL) 5 mg tablet, Take 2 tablets (10 mg total) by mouth 3 (three) times a day as needed for muscle spasms, Disp: 180 tablet, Rfl: 2    diclofenac sodium (VOLTAREN) 50 mg EC tablet, Take 1 tablet (50 mg total) by mouth 2 (two) times a day, Disp: 60 tablet, Rfl: 2    hydrOXYzine HCL (ATARAX) 25 mg tablet, Take 25 mg by mouth every 6 (six) hours as needed for itching, Disp: , Rfl:     levonorgestrel (MIRENA) 20 MCG/24HR IUD, 20 mcg/day, Disp: , Rfl:     Mirabegron ER (MYRBETRIQ) 50 MG TB24, Take by mouth daily, Disp: , Rfl:     oxybutynin (DITROPAN-XL) 5 mg 24 hr tablet, Take 1 tablet (5 mg total) by mouth daily, Disp: 30 tablet, Rfl: 11    oxyCODONE (ROXICODONE) 5 mg immediate release tablet, TAKE 1 TO 2 TABLETS BY MOUTH EVERY 12 HOURS FOR SEVERE PAIN, Disp: , Rfl: 0    No Known Allergies    Physical Exam:    /70   Pulse 90   Temp 99 °F (37 2 °C)   Wt 57 2 kg (126 lb)   BMI 21 63 kg/m²     Constitutional:normal, well developed, well nourished, alert, in no distress and non-toxic and no overt pain behavior    Eyes:anicteric  HEENT:grossly intact  Neck:supple, symmetric, trachea midline and no masses   Pulmonary:even and unlabored  Cardiovascular:No edema or pitting edema present  Skin:Normal without rashes or lesions and well hydrated  Psychiatric:Mood and affect appropriate  Neurologic:Cranial Nerves II-XII grossly intact  Musculoskeletal:antalgic gait  Bilateral AFO and place  Ambulates with a cane  Tenderness to palpation of bilateral rhomboids and thoracic paraspinal musculature  Imaging  Imaging reviewed  No orders to display     XR spine thoracic 3 vw   Status: Final result   PACS Images     Show images for XR spine thoracic 3 vw   Order Report      Order Details   Study Result     THORACIC SPINE     INDICATION: Back ache       COMPARISON: Lumbar spine plain films from 10/13/2011, and lumbar spine MR from 1/8/2017      VIEWS:  AP, lateral and coned-down projections     IMAGES:  4     FINDINGS:     Thoracic vertebrae demonstrate normal stature and alignment       Partially visualized is orthopedic hardware in T12, and the lumbar spine      There is no fracture or pathologic bone lesion       There is no displacement of the paraspinal line       The pedicles are intact      IMPRESSION:     No acute osseous injury to the thoracic spine         Workstation performed: [id number]      Imaging     XR spine thoracic 3 vw (Order #[id number]) on 2/23/2017 - Imaging Information     No orders of the defined types were placed in this encounter

## 2018-06-22 ENCOUNTER — OFFICE VISIT (OUTPATIENT)
Dept: FAMILY MEDICINE CLINIC | Facility: CLINIC | Age: 33
End: 2018-06-22
Payer: COMMERCIAL

## 2018-06-22 VITALS
DIASTOLIC BLOOD PRESSURE: 70 MMHG | RESPIRATION RATE: 16 BRPM | WEIGHT: 124.6 LBS | HEART RATE: 76 BPM | TEMPERATURE: 99 F | BODY MASS INDEX: 21.27 KG/M2 | SYSTOLIC BLOOD PRESSURE: 110 MMHG | HEIGHT: 64 IN

## 2018-06-22 DIAGNOSIS — R82.90 ABNORMAL URINALYSIS: ICD-10-CM

## 2018-06-22 DIAGNOSIS — R10.9 LEFT FLANK PAIN: Primary | ICD-10-CM

## 2018-06-22 LAB
SL AMB  POCT GLUCOSE, UA: ABNORMAL
SL AMB LEUKOCYTE ESTERASE,UA: ABNORMAL
SL AMB POCT BILIRUBIN,UA: ABNORMAL
SL AMB POCT BLOOD,UA: ABNORMAL
SL AMB POCT CLARITY,UA: CLEAR
SL AMB POCT COLOR,UA: YELLOW
SL AMB POCT KETONES,UA: ABNORMAL
SL AMB POCT NITRITE,UA: ABNORMAL
SL AMB POCT PH,UA: 9
SL AMB POCT SPECIFIC GRAVITY,UA: 1
SL AMB POCT URINE PROTEIN: ABNORMAL
SL AMB POCT UROBILINOGEN: 0.2

## 2018-06-22 PROCEDURE — 99213 OFFICE O/P EST LOW 20 MIN: CPT | Performed by: FAMILY MEDICINE

## 2018-06-22 PROCEDURE — 81003 URINALYSIS AUTO W/O SCOPE: CPT | Performed by: FAMILY MEDICINE

## 2018-06-22 RX ORDER — SULFAMETHOXAZOLE AND TRIMETHOPRIM 800; 160 MG/1; MG/1
1 TABLET ORAL EVERY 12 HOURS SCHEDULED
Qty: 20 TABLET | Refills: 0 | Status: SHIPPED | OUTPATIENT
Start: 2018-06-22 | End: 2018-07-02

## 2018-06-22 RX ORDER — DULOXETIN HYDROCHLORIDE 30 MG/1
30 CAPSULE, DELAYED RELEASE ORAL DAILY
COMMUNITY
Start: 2018-06-15 | End: 2018-08-14

## 2018-06-25 ENCOUNTER — TELEPHONE (OUTPATIENT)
Dept: FAMILY MEDICINE CLINIC | Facility: CLINIC | Age: 33
End: 2018-06-25

## 2018-06-25 PROBLEM — R10.9 LEFT FLANK PAIN: Status: ACTIVE | Noted: 2018-06-25

## 2018-06-25 PROBLEM — R82.90 ABNORMAL URINALYSIS: Status: ACTIVE | Noted: 2018-06-25

## 2018-06-25 NOTE — TELEPHONE ENCOUNTER
In response to the patient's recent E mail  I would hold Bactrim medication of his causing GI side effects  Can  We check to see if urine culture was sent out from last week's office visit?

## 2018-06-25 NOTE — PROGRESS NOTES
FAMILY PRACTICE OFFICE VISIT       NAME: Cristiana Jefferson  AGE: 29 y o  SEX: female       : 1985        MRN: [id number]    DATE: 2018  TIME: 6:46 AM    Assessment and Plan     Problem List Items Addressed This Visit     Left flank pain - Primary    Relevant Medications    sulfamethoxazole-trimethoprim (BACTRIM DS) 800-160 mg per tablet    Other Relevant Orders    POCT urine dip auto non-scope (Completed)    Abnormal urinalysis    Relevant Medications    sulfamethoxazole-trimethoprim (BACTRIM DS) 800-160 mg per tablet        Patient was given a prescription for Bactrim DS to use start 1 tablet twice daily for abnormal urinalysis  We will check urine culture  for verification of infection  Patient will call if symptoms persist after medication completed    There are no Patient Instructions on file for this visit  Chief Complaint     Chief Complaint   Patient presents with    Flank Pain     Pt is here w/ c/o of left side flank pain that was worse yesterday but is better today  Pt also states there was a rash on that side as well but is gone        History of Present Illness     Patient complained of vague left flank discomfort that was intermittent  She did notice some redness along prior scar from surgery  Today symptoms have fairly well resolved  She denies any significant dysuria  Urinalysis in the office was abnormal        Review of Systems   Review of Systems   Constitutional: Negative  Respiratory: Negative  Cardiovascular: Negative  Gastrointestinal: Negative  Genitourinary: Negative      Musculoskeletal:        As per HPI   Skin:        As per HPI       Active Problem List     Patient Active Problem List   Diagnosis    Paronychia of toe of right foot    Constipation    Weight loss    H/O spinal cord injury    Chronic thoracic back pain    Low vitamin B12 level    Vegetarian diet    [facility] L1 level [facility] cord [facility] [address] [facility]    Bipolar affective disorder ([facility] [address] [facility]    Lumbar radiculopathy    Myofascial pain    Left flank pain    Abnormal urinalysis       Past Medical History:  Past Medical History:   Diagnosis Date    Anemia     Anxiety     Depression     Foot drop     Mental disorder     Small bowel problem     Spinal cord injury at C1-C4 level [facility])        Past Surgical History:  Past Surgical History:   Procedure Laterality Date    CYSTOSCOPY      Diagnostic; onset: 1/3/17    MO COLONOSCOPY FLX DX W/COLLJ SPEC WHEN PFRMD N/A 12/12/2017    Procedure: COLONOSCOPY;  Surgeon: Magdy Lux MD;  [facility]; Service: Gastroenterology    SPINAL FUSION         Family History:  Family History   Problem Relation Age of Onset    Diabetes Father     Lymphoma Father         Waldenstrom's    Heart disease Father         cardiac disorder    Hypertension Father     Pulmonary embolism Father     Hyperlipidemia Father     Stroke Family        Social History:  Social History     Social History    Marital status: Single     Spouse name: N/A    Number of children: N/A    Years of education: N/A     Occupational History    Not on file  Social History Main Topics    Smoking status: Former Smoker     Packs/day: 1 00     Quit date: 12/12/2016    Smokeless tobacco: Never Used      Comment: current every day smoker, former smoker and smokes less than 1 ppd as per [employer]    Alcohol use Yes      Comment: 2 wkly    Drug use: No      Comment: Drug use as per [employer]    Sexual activity: Not on file     Other Topics Concern    Not on file     Social History Narrative    No narrative on file       Objective     Vitals:    06/22/18 1444   BP: 110/70   Pulse: 76   Resp: 16   Temp: 99 °F (37 2 °C)     Wt Readings from Last 3 Encounters:   06/22/18 56 5 kg (124 lb 9 6 oz)   06/19/18 57 2 kg (126 lb)   06/05/18 56 2 kg (124 lb)       Physical Exam   Constitutional: No distress     Cardiovascular:   Regular rate and rhythm with no murmurs   Pulmonary/Chest: Lungs are clear to auscultation without wheezes,rales, or rhonchi   Abdominal:   Abdomen is soft, nontender with positive bowel sounds  There is no rebound or guarding  No masses palpated   Musculoskeletal:   Patient walks with bilateral foot braces due to previous neurologic surgery     Skin:   Patient had a picture on her phone of in annular faint pink rash along left flank area which is now fairly well resolved       Pertinent Laboratory/Diagnostic Studies:  Lab Results   Component Value Date    GLUCOSE 92 12/28/2015    BUN 12 03/06/2018    CREATININE 0 55 03/06/2018    CALCIUM 9 1 03/06/2018     06/21/2017    K 4 1 06/21/2017    CO2 27 06/21/2017     06/21/2017     Lab Results   Component Value Date    ALT 15 06/21/2017    AST 14 06/21/2017    ALKPHOS 72 06/21/2017    BILITOT 0 5 06/21/2017       Lab Results   Component Value Date    WBC 3 8 06/21/2017    HGB 13 4 03/06/2018    HCT 39 3 03/06/2018    MCV 94 2 03/06/2018     03/06/2018       No results found for: TSH    Lab Results   Component Value Date    CHOL 181 08/23/2014     Lab Results   Component Value Date    TRIG 112 08/23/2014     Lab Results   Component Value Date    HDL 73 08/23/2014     Lab Results   Component Value Date    LDLCALC 86 08/23/2014     Lab Results   Component Value Date    HGBA1C 4 8 02/01/2017       Results for orders placed or performed in visit on 06/22/18   POCT urine dip auto non-scope   Result Value Ref Range     COLOR,UA yellow      CLARITY,UA clear      SPECIFIC GRAVITY,UA 1 000      PH,UA 9 0     LEUKOCYTE ESTERASE,UA 70+      NITRITE,UA -     GLUCOSE, UA -      KETONES,UA -      BILIRUBIN,UA -      BLOOD,UA -     SL AMB POCT URINE PROTEIN -     SL AMB POCT UROBILINOGEN 0 2        Orders Placed This Encounter   Procedures    POCT urine dip auto non-scope       ALLERGIES:  No Known Allergies    Current Medications     Current Outpatient Prescriptions   Medication Sig Dispense Refill    busPIRone (BUSPAR) 10 mg tablet Take 1 tablet by mouth 3 (three) times a day      carBAMazepine (TEGretol) 200 mg tablet Take 200 mg by mouth 2 (two) times a day        cyclobenzaprine (FLEXERIL) 5 mg tablet Take 2 tablets (10 mg total) by mouth 3 (three) times a day as needed for muscle spasms 180 tablet 2    diclofenac sodium (VOLTAREN) 50 mg EC tablet Take 1 tablet (50 mg total) by mouth 2 (two) times a day 60 tablet 2    DULoxetine (CYMBALTA) 30 mg delayed release capsule Take 30 mg by mouth daily        hydrOXYzine HCL (ATARAX) 25 mg tablet Take 25 mg by mouth every 6 (six) hours as needed for itching      levonorgestrel (MIRENA) 20 MCG/24HR IUD 20 mcg/day      Mirabegron ER (MYRBETRIQ) 50 MG TB24 Take by mouth daily      oxybutynin (DITROPAN-XL) 5 mg 24 hr tablet Take 1 tablet (5 mg total) by mouth daily 30 tablet 11    oxyCODONE (ROXICODONE) 5 mg immediate release tablet TAKE 1 TO 2 TABLETS BY MOUTH EVERY 12 HOURS FOR SEVERE PAIN  0    sulfamethoxazole-trimethoprim (BACTRIM DS) 800-160 mg per tablet Take 1 tablet by mouth every 12 (twelve) hours for 10 days 20 tablet 0     No current facility-administered medications for this visit            Health Maintenance     Health Maintenance   Topic Date Due    HIV SCREENING  1985    Depression Screening PHQ-9  1985    INFLUENZA VACCINE  09/01/2018    CRC Screening: Colonoscopy  12/12/2022    DTaP,Tdap,and Td Vaccines (2 - Td) 04/19/2026     Immunization History   Administered Date(s) Administered    Influenza 03/01/2017    Influenza Quadrivalent Preservative Free 3 years and older IM 01/18/2017, 10/03/2017    Influenza TIV (IM) 11/12/2015    Tdap 88/02/3586       Tiffanie Haas MD

## 2018-06-26 ENCOUNTER — CLINICAL SUPPORT (OUTPATIENT)
Dept: FAMILY MEDICINE CLINIC | Facility: CLINIC | Age: 33
End: 2018-06-26
Payer: COMMERCIAL

## 2018-06-26 ENCOUNTER — TELEPHONE (OUTPATIENT)
Dept: FAMILY MEDICINE CLINIC | Facility: CLINIC | Age: 33
End: 2018-06-26

## 2018-06-26 DIAGNOSIS — R39.9 UTI SYMPTOMS: Primary | ICD-10-CM

## 2018-06-26 LAB
SL AMB  POCT GLUCOSE, UA: NEGATIVE
SL AMB LEUKOCYTE ESTERASE,UA: ABNORMAL
SL AMB POCT BILIRUBIN,UA: NEGATIVE
SL AMB POCT BLOOD,UA: NEGATIVE
SL AMB POCT CLARITY,UA: ABNORMAL
SL AMB POCT COLOR,UA: YELLOW
SL AMB POCT KETONES,UA: NEGATIVE
SL AMB POCT NITRITE,UA: NEGATIVE
SL AMB POCT PH,UA: 6.5
SL AMB POCT SPECIFIC GRAVITY,UA: 1.01
SL AMB POCT URINE PROTEIN: ABNORMAL
SL AMB POCT UROBILINOGEN: 0.2

## 2018-06-26 PROCEDURE — 87186 SC STD MICRODIL/AGAR DIL: CPT | Performed by: FAMILY MEDICINE

## 2018-06-26 PROCEDURE — 87086 URINE CULTURE/COLONY COUNT: CPT | Performed by: FAMILY MEDICINE

## 2018-06-26 PROCEDURE — 81002 URINALYSIS NONAUTO W/O SCOPE: CPT

## 2018-06-26 NOTE — TELEPHONE ENCOUNTER
----- Message from Rohini Palacios sent at 6/26/2018  8:26 AM EDT -----  Regarding: RE:Your Recent Visit  Contact: [phone]  Hi  I had some pain again in my side and would like to come in to redo the urine culture    ----- Message -----  From: Jessica Segal MD  Sent: 1/02/6971  6:47 AM EDT  To: Rohini Palacios  Subject: Your Recent Visit  Rohini Palacios, you have a new After Visit Summary in [address] [place] [address]  Please click on visits and then your most recent appointment, to view your After Visit Summary  If you are experiencing any technical issues please call our patient service desk at [phone]) option [phone]

## 2018-06-26 NOTE — TELEPHONE ENCOUNTER
Spoke with the patient, advised her to drop off a urine sample for a urine dip  Nurse appointment scheduled

## 2018-06-29 ENCOUNTER — TELEPHONE (OUTPATIENT)
Dept: FAMILY MEDICINE CLINIC | Facility: CLINIC | Age: 33
End: 2018-06-29

## 2018-06-29 LAB — BACTERIA UR CULT: ABNORMAL

## 2018-06-29 NOTE — TELEPHONE ENCOUNTER
----- Message from Rc Abreu MD sent at 1/05/9018 10:15 AM EDT -----  Recent urine culture was negative   F/u with Dr Looney Revering next week if sx's persisting

## 2018-07-05 ENCOUNTER — TELEPHONE (OUTPATIENT)
Dept: UROLOGY | Facility: CLINIC | Age: 33
End: 2018-07-05

## 2018-07-05 NOTE — TELEPHONE ENCOUNTER
Gracie Singer  [facility] and from my standpoint still was we were not doing PTNS on patients with the insurance  If anything changed let me know    Thanks  Tigist Razo

## 2018-07-05 NOTE — TELEPHONE ENCOUNTER
Called and spoke with patient  She is interested in PTNS as discussed at last appt  She is aware it is once a week for 12 weeks  She currently has a FU scheduled on 7/24 and plans to keep that appt  Will check insurance coverage in the meantime       Please check insurance coverage of PTNS

## 2018-07-05 NOTE — TELEPHONE ENCOUNTER
Patient notified that given her insurance is a medical assistance plan, we cannot offer PTNS due to it not being on the Fee schedule or a covered benefit  Additionally we cannot do self pay for medical assistance patients either  Patient will follow up as scheduled on 7/24 to discuss her options

## 2018-08-14 ENCOUNTER — OFFICE VISIT (OUTPATIENT)
Dept: FAMILY MEDICINE CLINIC | Facility: CLINIC | Age: 33
End: 2018-08-14
Payer: COMMERCIAL

## 2018-08-14 VITALS
DIASTOLIC BLOOD PRESSURE: 68 MMHG | HEIGHT: 64 IN | WEIGHT: 124.2 LBS | HEART RATE: 80 BPM | TEMPERATURE: 98.8 F | BODY MASS INDEX: 21.21 KG/M2 | SYSTOLIC BLOOD PRESSURE: 94 MMHG | RESPIRATION RATE: 14 BRPM

## 2018-08-14 DIAGNOSIS — R82.998 LEUKOCYTES IN URINE: ICD-10-CM

## 2018-08-14 DIAGNOSIS — R10.9 LEFT FLANK PAIN: Primary | ICD-10-CM

## 2018-08-14 LAB
BACTERIA UR QL AUTO: ABNORMAL /HPF
BILIRUB UR QL STRIP: NEGATIVE
CLARITY UR: CLEAR
COLOR UR: YELLOW
GLUCOSE UR STRIP-MCNC: NEGATIVE MG/DL
HGB UR QL STRIP.AUTO: NEGATIVE
HYALINE CASTS #/AREA URNS LPF: ABNORMAL /LPF
KETONES UR STRIP-MCNC: NEGATIVE MG/DL
LEUKOCYTE ESTERASE UR QL STRIP: ABNORMAL
NITRITE UR QL STRIP: NEGATIVE
NON-SQ EPI CELLS URNS QL MICRO: ABNORMAL /HPF
PH UR STRIP.AUTO: 7.5 [PH] (ref 4.5–8)
PROT UR STRIP-MCNC: NEGATIVE MG/DL
RBC #/AREA URNS AUTO: ABNORMAL /HPF
SL AMB  POCT GLUCOSE, UA: NEGATIVE
SL AMB LEUKOCYTE ESTERASE,UA: ABNORMAL
SL AMB POCT BILIRUBIN,UA: NEGATIVE
SL AMB POCT BLOOD,UA: NEGATIVE
SL AMB POCT CLARITY,UA: ABNORMAL
SL AMB POCT COLOR,UA: YELLOW
SL AMB POCT KETONES,UA: NEGATIVE
SL AMB POCT NITRITE,UA: NEGATIVE
SL AMB POCT PH,UA: 7.5
SL AMB POCT SPECIFIC GRAVITY,UA: 1.01
SL AMB POCT URINE PROTEIN: NEGATIVE
SL AMB POCT UROBILINOGEN: 0.2
SP GR UR STRIP.AUTO: 1.01 (ref 1–1.03)
UROBILINOGEN UR QL STRIP.AUTO: 0.2 E.U./DL
WBC #/AREA URNS AUTO: ABNORMAL /HPF

## 2018-08-14 PROCEDURE — 87086 URINE CULTURE/COLONY COUNT: CPT | Performed by: FAMILY MEDICINE

## 2018-08-14 PROCEDURE — 81002 URINALYSIS NONAUTO W/O SCOPE: CPT | Performed by: FAMILY MEDICINE

## 2018-08-14 PROCEDURE — 99213 OFFICE O/P EST LOW 20 MIN: CPT | Performed by: FAMILY MEDICINE

## 2018-08-14 PROCEDURE — 81001 URINALYSIS AUTO W/SCOPE: CPT | Performed by: FAMILY MEDICINE

## 2018-08-14 PROCEDURE — 3725F SCREEN DEPRESSION PERFORMED: CPT | Performed by: FAMILY MEDICINE

## 2018-08-14 NOTE — PROGRESS NOTES
FAMILY PRACTICE OFFICE VISIT       NAME: Rufina Bonner  AGE: 29 y o  SEX: female       : 1985        MRN: [id number]    DATE: 8/15/2018  TIME: 8:29 PM    Assessment and Plan     Problem List Items Addressed This Visit     Left flank pain - Primary       Patient presents with 5 day history of left-sided back / flank pain, denies injury  Will start off by obtaining x-ray of ribs, ultrasound of kidney/bladder  POC urine dip positive for leukocytes  Will send for urinalysis as well as culture  Patient denies dysuria  May apply heat if this helps  Return/ ER parameters discussed in detail  Patient is agreeable with this plan  Relevant Orders    US kidney and bladder    XR ribs left w pa chest min 3 views      Other Visit Diagnoses     Leukocytes in urine        Relevant Orders    POCT urine dip (Completed)    Urine culture (Completed)    UA (URINE) with reflex to Microscopic (Completed)    Urine Microscopic (Completed)            There are no Patient Instructions on file for this visit  Chief Complaint     Chief Complaint   Patient presents with    Flank Pain     Patient is here c/o left side pain x's 5+ days  History of Present Illness     HPI   62-year-old female presents with a 5 day history of left-sided back pain which is intermittent, exacerbated by twisting movements  Denies fevers, chills, difficulty with urination including burning, frequency or urgency  Patient had, when she voids, has to push which causes pain over the left side of her back  The pain is described as a stabbing pain when this does occur  Patient states she had a similar type of pain approximately 2 months ago in the same area  Review of Systems   Review of Systems   Constitutional: Negative for chills and fever  Gastrointestinal: Negative for abdominal pain  Genitourinary: Negative for dysuria and hematuria     Musculoskeletal:          Left-sided back pain       Active Problem List Patient Active Problem List   Diagnosis    Paronychia of toe of right foot    Constipation    Weight loss    H/O spinal cord injury    Chronic thoracic back pain    Low vitamin B12 level    Vegetarian diet    Injury to L1 level of spinal cord (HCC)    Bipolar affective disorder (HCC)    Lumbar radiculopathy    Myofascial pain    Left flank pain    Abnormal urinalysis       Past Medical History:  Past Medical History:   Diagnosis Date    Anemia     Anxiety     Depression     Foot drop     Mental disorder     Small bowel problem     Spinal cord injury at C1-C4 level [facility])        Past Surgical History:  Past Surgical History:   Procedure Laterality Date    CYSTOSCOPY      Diagnostic; onset: 1/3/17    GA COLONOSCOPY FLX DX W/COLLJ SPEC WHEN PFRMD N/A 12/12/2017    Procedure: COLONOSCOPY;  Surgeon: Aditya Sandoval MD;  [facility]; Service: Gastroenterology    SPINAL FUSION         Family History:  Family History   Problem Relation Age of Onset    Diabetes Father     Lymphoma Father         Waldenstrom's    Heart disease Father         cardiac disorder    Hypertension Father     Pulmonary embolism Father     Hyperlipidemia Father     Stroke Family        Social History:  Social History     Social History    Marital status: Single     Spouse name: N/A    Number of children: N/A    Years of education: N/A     Occupational History    Not on file       Social History Main Topics    Smoking status: Former Smoker     Packs/day: 1 00     Quit date: 12/12/2016    Smokeless tobacco: Never Used      Comment: current every day smoker, former smoker and smokes less than 1 ppd as per [employer]    Alcohol use Yes      Comment: 2 wkly    Drug use: No      Comment: Drug use as per [employer]    Sexual activity: Not on file     Other Topics Concern    Not on file     Social History Narrative    No narrative on file     I have reviewed the patient's medical history in detail; there are no changes to the history as noted in the electronic medical record  Objective     Vitals:    08/14/18 1337   BP: 94/68   Pulse: 80   Resp: 14   Temp: 98 8 °F (37 1 °C)     Wt Readings from Last 3 Encounters:   08/14/18 56 3 kg (124 lb 3 2 oz)   06/22/18 56 5 kg (124 lb 9 6 oz)   06/19/18 57 2 kg (126 lb)       Physical Exam   Constitutional: She is oriented to person, place, and time  She appears well-developed and well-nourished  HENT:   Head: Normocephalic and atraumatic  Mouth/Throat: Oropharynx is clear and moist    Eyes: Conjunctivae and EOM are normal  Pupils are equal, round, and reactive to light  Neck: Normal range of motion  Neck supple  Cardiovascular: Normal rate, regular rhythm and normal heart sounds  Pulmonary/Chest: Effort normal and breath sounds normal    Abdominal: Soft  Bowel sounds are normal  She exhibits no distension  There is no tenderness  Minimally tender to palpation suprapubic region  No CVA tenderness noted  Musculoskeletal:     Minimally tender palpation of left posterior lower rib region  Lymphadenopathy:     She has no cervical adenopathy  Neurological: She is alert and oriented to person, place, and time  Psychiatric: She has a normal mood and affect  Nursing note and vitals reviewed        Pertinent Laboratory/Diagnostic Studies:  Lab Results   Component Value Date    GLUCOSE 92 12/28/2015    BUN 12 03/06/2018    CREATININE 0 55 03/06/2018    CALCIUM 9 1 03/06/2018     06/21/2017    K 4 1 06/21/2017    CO2 27 06/21/2017     06/21/2017     Lab Results   Component Value Date    ALT 15 06/21/2017    AST 14 06/21/2017    ALKPHOS 72 06/21/2017    BILITOT 0 5 06/21/2017       Lab Results   Component Value Date    WBC 3 9 03/06/2018    HGB 13 4 03/06/2018    HCT 39 3 03/06/2018    MCV 94 2 03/06/2018     03/06/2018       No results found for: TSH    Lab Results   Component Value Date    CHOL 181 08/23/2014     Lab Results Component Value Date    TRIG 112 08/23/2014     Lab Results   Component Value Date    HDL 73 08/23/2014     Lab Results   Component Value Date    LDLCALC 86 08/23/2014     Lab Results   Component Value Date    HGBA1C 4 8 02/01/2017       Results for orders placed or performed in visit on 08/14/18   Urine culture   Result Value Ref Range    Urine Culture 70,000-79,000 cfu/ml     UA (URINE) with reflex to Microscopic   Result Value Ref Range    Color, UA Yellow     Clarity, UA Clear     Specific [place], UA 1 011 1 003 - 1 030    pH, UA 7 5 4 5 - 8 0    Leukocytes, UA Moderate (A) Negative    Nitrite, UA Negative Negative    Protein, UA Negative Negative mg/dl    Glucose, UA Negative Negative mg/dl    Ketones, UA Negative Negative mg/dl    Urobilinogen, UA 0 2 0 2, 1 0 E U /dl E U /dl    Bilirubin, UA Negative Negative    Blood, UA Negative Negative   Urine Microscopic   Result Value Ref Range    RBC, UA 10-20 (A) None Seen, 0-5 /hpf    WBC, UA 10-20 (A) None Seen, 0-5, 5-55, 5-65 /hpf    Epithelial Cells Occasional None Seen, Occasional /hpf    Bacteria, UA Occasional None Seen, Occasional /hpf    Hyaline Casts, UA None Seen None Seen /lpf   POCT urine dip   Result Value Ref Range    LEUKOCYTE ESTERASE,UA 70+      NITRITE,UA negative     SL AMB POCT UROBILINOGEN 0 2     SL AMB POCT URINE PROTEIN negative      PH,UA 7 5      BLOOD,UA negative      SPECIFIC GRAVITY,UA 1 010      KETONES,UA negative      BILIRUBIN,UA negative     GLUCOSE, UA negative      COLOR,UA yellow      CLARITY,UA hazy        Orders Placed This Encounter   Procedures    Urine culture    US kidney and bladder    XR ribs left w pa chest min 3 views    UA (URINE) with reflex to Microscopic    Urine Microscopic    POCT urine dip       ALLERGIES:  Allergies   Allergen Reactions    Sulfamethoxazole Nausea Only and GI Intolerance       Current Medications     Current Outpatient Prescriptions   Medication Sig Dispense Refill    busPIRone (BUSPAR) 10 mg tablet Take 1 tablet by mouth 3 (three) times a day      carBAMazepine (TEGretol) 200 mg tablet Take 200 mg by mouth 2 (two) times a day        hydrOXYzine HCL (ATARAX) 25 mg tablet Take 25 mg by mouth every 6 (six) hours as needed for itching      levonorgestrel (MIRENA) 20 MCG/24HR IUD 20 mcg/day      Mirabegron ER (MYRBETRIQ) 50 MG TB24 Take by mouth daily      oxybutynin (DITROPAN-XL) 5 mg 24 hr tablet Take 1 tablet (5 mg total) by mouth daily 30 tablet 11     No current facility-administered medications for this visit            Health Maintenance     Health Maintenance   Topic Date Due    HIV SCREENING  1985    INFLUENZA VACCINE  09/01/2018    Depression Screening PHQ-9  08/14/2019    CRC Screening: Colonoscopy  12/12/2022    DTaP,Tdap,and Td Vaccines (2 - Td) 04/19/2026     Immunization History   Administered Date(s) Administered    Influenza 03/01/2017, 01/01/2018    Influenza Quadrivalent Preservative Free 3 years and older IM 01/18/2017, 10/03/2017    Influenza TIV (IM) 11/12/2015    Tdap 04/19/2016       Vesta Potter MD

## 2018-08-15 ENCOUNTER — HOSPITAL ENCOUNTER (OUTPATIENT)
Dept: RADIOLOGY | Facility: HOSPITAL | Age: 33
Discharge: HOME | End: 2018-08-15
Payer: COMMERCIAL

## 2018-08-15 ENCOUNTER — TRANSCRIBE ORDERS (OUTPATIENT)
Dept: RADIOLOGY | Facility: HOSPITAL | Age: 33
End: 2018-08-15

## 2018-08-15 DIAGNOSIS — R10.9 LEFT FLANK PAIN: ICD-10-CM

## 2018-08-15 LAB — BACTERIA UR CULT: NORMAL

## 2018-08-15 PROCEDURE — 71101 X-RAY EXAM UNILAT RIBS/CHEST: CPT

## 2018-08-16 ENCOUNTER — TELEPHONE (OUTPATIENT)
Dept: FAMILY MEDICINE CLINIC | Facility: CLINIC | Age: 33
End: 2018-08-16

## 2018-08-16 NOTE — PROGRESS NOTES
Can you please let patient know that her urine culture did not grow any bacteria however she did have some microscopic blood in her urine  I would like her to drop-off another urine sample approximately 2 weeks after her next period to recheck her urine for microscopic blood    Thank you

## 2018-08-16 NOTE — TELEPHONE ENCOUNTER
----- Message from Gianluca Barton MD sent at 8/16/2018 11:38 AM EDT -----  Can you please let patient know that her urine culture did not grow any bacteria however she did have some microscopic blood in her urine  I would like her to drop-off another urine sample approximately 2 weeks after her next period to recheck her urine for microscopic blood    Thank you

## 2018-08-16 NOTE — TELEPHONE ENCOUNTER
----- Message from Merary West MD sent at 8/16/2018 11:38 AM EDT -----  Can you please let patient know that her urine culture did not grow any bacteria however she did have some microscopic blood in her urine  I would like her to drop-off another urine sample approximately 2 weeks after her next period to recheck her urine for microscopic blood    Thank you

## 2018-08-16 NOTE — ASSESSMENT & PLAN NOTE
Patient presents with 5 day history of left-sided back / flank pain, denies injury  Will start off by obtaining x-ray of ribs, ultrasound of kidney/bladder  POC urine dip positive for leukocytes  Will send for urinalysis as well as culture  Patient denies dysuria  May apply heat if this helps  Return/ ER parameters discussed in detail  Patient is agreeable with this plan

## 2018-08-20 DIAGNOSIS — T84.498A LOOSENING OF HARDWARE IN SPINE (HCC): Primary | ICD-10-CM

## 2018-08-20 NOTE — PROGRESS NOTES
Spoke to patient regarding results of her ribs / chest x-ray which shows screws in the T12 vertebral body that are broken  I would like patient to be further evaluated by Dr Lai  As soon as possible  Have provided patient with number for Orthopedics  Have placed order in the system  Patient is agreeable with this plan

## 2018-08-21 ENCOUNTER — TELEPHONE (OUTPATIENT)
Dept: OBGYN CLINIC | Facility: HOSPITAL | Age: 33
End: 2018-08-21

## 2018-08-21 ENCOUNTER — TELEPHONE (OUTPATIENT)
Dept: FAMILY MEDICINE CLINIC | Facility: CLINIC | Age: 33
End: 2018-08-21

## 2018-08-21 NOTE — TELEPHONE ENCOUNTER
Jeanne Davis,  Can you please call ortho as I cannot order an mri  The reason I am referring th ept is because she has hardware that is broken  Thank you!

## 2018-08-21 NOTE — TELEPHONE ENCOUNTER
----- Message from Jeison Matthews sent at 8/21/2018  9:07 AM EDT -----  Regarding: RE:Your Recent Visit  Contact: [phone]  I couldnt get through to the orthopedic doctor yesterday but I left a message and I will try again today  But I have a trip coming up and Im wondering how serious this is, whether it would be ok if I can only get an appointment after the trip which is a week long starting Saturday, or if I need to cancel the trip or definitely see someone this week  Thanks   ----- Message -----  From: Misti Goncalves MD  Sent: 8/15/2018  8:30 PM EDT  To: Jeison Matthews  Subject: Your Recent Visit  Jeison Matthews, you have a new After Visit Summary in  [facility]  Please click on visits and then your most recent appointment, to view your After Visit Summary  If you are experiencing any technical issues please call our patient service desk at [phone]) option [phone]

## 2018-08-21 NOTE — TELEPHONE ENCOUNTER
Spoke with Amanda at [facility], all info explained to her  She will be speaking to Dr Charbel Philip directly as pt is not able to have MRI due to hardware  I did speak with pt, she had surgery by Dr Brianna Gilman at [facility] 5 years ago but he no longer takes her insurance  She will contact that office to have records faxed to [facility]

## 2018-08-21 NOTE — TELEPHONE ENCOUNTER
----- Message from Anne Marie Hart sent at 8/21/2018 12:29 PM EDT -----  Regarding: RE:Your Recent Visit  Contact: [phone]  Doctor Maxime Cisse office is now saying I cant get an appointment with anyone there without an mri or sending my surgery records which would take up to 30 days to review  They said maybe I should see neuro cause Ive already had surgery  Please ask Varsha to order an mri or refer me to someone else  I would really like to know how serious this is because it is causing a lot of anxiety    ----- Message -----  From: Darby Loya MD  Sent: 8/15/2018  8:30 PM EDT  To: Anne Marie Hart  Subject: Your Recent Visit  Anne Marie Hart, you have a new After Visit Summary in [facility]  Please click on visits and then your most recent appointment, to view your After Visit Summary  If you are experiencing any technical issues please call our patient service desk at [phone]) option [phone]

## 2018-08-21 NOTE — TELEPHONE ENCOUNTER
horacio tried to schedule w/ Dr Amezquita Pu as per dr Karthik Payan suggestion but cant get in because she doesn't have an mri    Could you suggest somebody else

## 2018-08-21 NOTE — TELEPHONE ENCOUNTER
Caller: Nallely Fernandez, PCP's office  Call back number: [phone]    PCP's office called stating that the patient has loosening of the hardware in her back  They do not know where the surgery was done or when  I did ask them to speak to the patient and find out when and where it was done and to have her request the records  They are asking if it is alright to schedule since the loosening of the hardware is causing her back to be unstable  The rib x-ray that is in the system shows the loosening   Please advise

## 2018-08-21 NOTE — TELEPHONE ENCOUNTER
----- Message from Rohini Palacios sent at 8/21/2018 12:29 PM EDT -----  Regarding: RE:Your Recent Visit  Contact: [phone]  Doctor Maxime Cisse office is now saying I cant get an appointment with anyone there without an mri or sending my surgery records which would take up to 30 days to review  They said maybe I should see neuro cause Ive already had surgery  Please ask Varsha to order an mri or refer me to someone else  I would really like to know how serious this is because it is causing a lot of anxiety    ----- Message -----  From: Earnest Roger MD  Sent: 8/15/2018  8:30 PM EDT  To: Rohini Palacios  Subject: Your Recent Visit  Rohini Palacios, you have a new After Visit Summary in [address] [place] [address]  Please click on visits and then your most recent appointment, to view your After Visit Summary  If you are experiencing any technical issues please call our patient service desk at [phone] ([phone]) option [phone]

## 2018-08-21 NOTE — TELEPHONE ENCOUNTER
Spoke with pt to clarify this message  She contacted  Ortho and was told she could not see Dr Brittany Franco without an MRI and was offered to see another doctor for "a back work up"  Pt was not sure how to proceed with Ortho as she was instructed to see Dr Brittany Franco by you  Pt is leaving and for vacation and wanted to know how serious this and if she could go or not

## 2018-08-22 ENCOUNTER — APPOINTMENT (OUTPATIENT)
Dept: RADIOLOGY | Facility: CLINIC | Age: 33
End: 2018-08-22
Payer: COMMERCIAL

## 2018-08-22 ENCOUNTER — HOSPITAL ENCOUNTER (OUTPATIENT)
Dept: RADIOLOGY | Facility: HOSPITAL | Age: 33
Discharge: HOME | End: 2018-08-22
Payer: COMMERCIAL

## 2018-08-22 ENCOUNTER — OFFICE VISIT (OUTPATIENT)
Dept: OBGYN CLINIC | Facility: CLINIC | Age: 33
End: 2018-08-22
Payer: COMMERCIAL

## 2018-08-22 VITALS
DIASTOLIC BLOOD PRESSURE: 73 MMHG | HEIGHT: 64 IN | BODY MASS INDEX: 21.17 KG/M2 | HEART RATE: 86 BPM | WEIGHT: 124 LBS | SYSTOLIC BLOOD PRESSURE: 111 MMHG

## 2018-08-22 DIAGNOSIS — Z87.828 H/O SPINAL CORD INJURY: ICD-10-CM

## 2018-08-22 DIAGNOSIS — G89.29 CHRONIC BILATERAL THORACIC BACK PAIN: ICD-10-CM

## 2018-08-22 DIAGNOSIS — M54.6 CHRONIC BILATERAL THORACIC BACK PAIN: ICD-10-CM

## 2018-08-22 DIAGNOSIS — S34.101A INJURY TO L1 LEVEL OF SPINAL CORD, INITIAL ENCOUNTER (HCC): ICD-10-CM

## 2018-08-22 DIAGNOSIS — S34.101A INJURY TO L1 LEVEL OF SPINAL CORD, INITIAL ENCOUNTER (HCC): Primary | ICD-10-CM

## 2018-08-22 DIAGNOSIS — R10.9 LEFT FLANK PAIN: ICD-10-CM

## 2018-08-22 PROCEDURE — 72100 X-RAY EXAM L-S SPINE 2/3 VWS: CPT

## 2018-08-22 PROCEDURE — 76770 US EXAM ABDO BACK WALL COMP: CPT

## 2018-08-22 PROCEDURE — 99204 OFFICE O/P NEW MOD 45 MIN: CPT | Performed by: ORTHOPAEDIC SURGERY

## 2018-08-22 PROCEDURE — 72082 X-RAY EXAM ENTIRE SPI 2/3 VW: CPT

## 2018-08-22 NOTE — PROGRESS NOTES
Assessment/Plan:      Patient seen and examined with Dr Neville Fraire  She presents for chronic left sided thoracic pain  She has a history of significant spinal cord injury with subsequent Anterior L1 corpectomy, and fusion T12-L2 done in 2013  X-rays in the office today demonstrate broken screws at T12  At this time we will order CT myelogram of the thoracic and lumbar spine  She will need blood work prior to studies  Follow-up in 2 weeks for imaging review  Further discussion regarding surgical intervention will take place at that time  Problem List Items Addressed This Visit     H/O spinal cord injury    Chronic thoracic back pain    Injury to L1 level of spinal cord (HCC) - Primary            Subjective:      Patient ID: Anne Marie Hart is a 29 y o  female  HPI     Patient is a 81-year-old female who presents for initial evaluation with Dr Neville Fraire  She has a significant spine surgical history  According to the patient and records, in 2013, she had a 25 foot fall out of a tree sustaining an L1 burst fracture with complete injury at the conus  Record states that she had no motor the lower extremities and was taken for urgent decompression and fixation of the spine  She underwent anterior lumbar corpectomy at L1, and anterior spinal fusion T12-L2  This was done by Dr Bailon  Today, the patient states that she has had chronic left-sided thoracic pain for several years and feels that is is progressively getting worse  She also notes broken hardware on recent rib x-rays, which prompted referral here  Currently, she is able to ambulate with the assistance of a cane however she feels her balance has been off recently  She notes chronic bladder dysfunction, no new symptoms      The following portions of the patient's history were reviewed and updated as appropriate: allergies, current medications, past family history, past medical history, past social history, past surgical history and problem list     Review of Systems   Constitutional: Negative for chills, diaphoresis, fatigue and fever  Respiratory: Negative for shortness of breath and wheezing  Cardiovascular: Negative for chest pain, palpitations and leg swelling  Objective:      /73   Pulse 86   Ht 5' 4" (1 626 m)   Wt 56 2 kg (124 lb)   BMI 21 28 kg/m²          Physical Exam   Constitutional: She is oriented to person, place, and time  She appears well-developed and well-nourished  No distress  HENT:   Head: Normocephalic and atraumatic  Abdominal: Soft  Musculoskeletal: She exhibits tenderness  She exhibits no edema  Neurological: She is alert and oriented to person, place, and time  Skin: Skin is warm and dry  She is not diaphoretic  Psychiatric: She has a normal mood and affect  Nursing note and vitals reviewed  NAD  Gait is slow and assisted with a cane  Inspection reveals left lateral thoracic well healed old incisions  There is obvious atrophy lower leg muscles bilaterally  Left paraspinal thoracic musculature is TTP  Negative modified straight leg raise bilaterally  Strength is 5/5 L2 through L4 bilaterally  She has chronic footdrop bilaterally   Sensation is equal and intact L2 through S1

## 2018-08-24 ENCOUNTER — TELEPHONE (OUTPATIENT)
Dept: PAIN MEDICINE | Facility: MEDICAL CENTER | Age: 33
End: 2018-08-24

## 2018-08-24 ENCOUNTER — TRANSCRIBE ORDERS (OUTPATIENT)
Dept: ADMINISTRATIVE | Facility: HOSPITAL | Age: 33
End: 2018-08-24

## 2018-08-24 ENCOUNTER — TELEPHONE (OUTPATIENT)
Dept: FAMILY MEDICINE CLINIC | Facility: CLINIC | Age: 33
End: 2018-08-24

## 2018-08-24 DIAGNOSIS — Z87.828 H/O SPINAL CORD INJURY: Primary | ICD-10-CM

## 2018-08-24 NOTE — TELEPHONE ENCOUNTER
----- Message from Gianluca Barton MD sent at 8/23/2018  6:07 PM EDT -----  Can you please let patient know that kidney/bladder ultrasound was normal   Thank you

## 2018-08-24 NOTE — TELEPHONE ENCOUNTER
Pt left message in voicemail at 9:48 this morning, stating that she needs to r/s her appt for this afternoon with Dr Makenzie Mendez  Attempted to reach patient, but had to leave a message on her machine to call back  Pt left a call back number of [phone]

## 2018-09-05 LAB
FERRITIN SERPL-MCNC: 68 NG/ML (ref 10–154)
IRON SERPL-MCNC: 124 MCG/DL (ref 40–190)
VIT B12 SERPL-MCNC: 535 PG/ML (ref 200–1100)

## 2018-09-06 ENCOUNTER — HOSPITAL ENCOUNTER (OUTPATIENT)
Dept: RADIOLOGY | Facility: HOSPITAL | Age: 33
Discharge: HOME | End: 2018-09-06
Admitting: RADIOLOGY
Payer: COMMERCIAL

## 2018-09-06 ENCOUNTER — TELEPHONE (OUTPATIENT)
Dept: FAMILY MEDICINE CLINIC | Facility: CLINIC | Age: 33
End: 2018-09-06

## 2018-09-06 ENCOUNTER — HOSPITAL ENCOUNTER (OUTPATIENT)
Dept: RADIOLOGY | Facility: HOSPITAL | Age: 33
Discharge: HOME | End: 2018-09-06
Payer: COMMERCIAL

## 2018-09-06 VITALS
WEIGHT: 125 LBS | SYSTOLIC BLOOD PRESSURE: 125 MMHG | RESPIRATION RATE: 16 BRPM | HEIGHT: 64 IN | DIASTOLIC BLOOD PRESSURE: 76 MMHG | TEMPERATURE: 100.5 F | OXYGEN SATURATION: 99 % | HEART RATE: 80 BPM | BODY MASS INDEX: 21.34 KG/M2

## 2018-09-06 DIAGNOSIS — Z87.828 H/O SPINAL CORD INJURY: ICD-10-CM

## 2018-09-06 LAB
APTT PPP: 27 SECONDS (ref 24–36)
EXT PREGNANCY TEST URINE: NEGATIVE
INR PPP: 1.02 (ref 0.86–1.17)
PLATELET # BLD AUTO: 201 THOUSANDS/UL (ref 149–390)
PMV BLD AUTO: 9.5 FL (ref 8.9–12.7)
PROTHROMBIN TIME: 13.5 SECONDS (ref 11.8–14.2)

## 2018-09-06 PROCEDURE — 72128 CT CHEST SPINE W/O DYE: CPT

## 2018-09-06 PROCEDURE — 85610 PROTHROMBIN TIME: CPT | Performed by: PHYSICIAN ASSISTANT

## 2018-09-06 PROCEDURE — 85730 THROMBOPLASTIN TIME PARTIAL: CPT | Performed by: PHYSICIAN ASSISTANT

## 2018-09-06 PROCEDURE — 85049 AUTOMATED PLATELET COUNT: CPT | Performed by: PHYSICIAN ASSISTANT

## 2018-09-06 PROCEDURE — 72131 CT LUMBAR SPINE W/O DYE: CPT

## 2018-09-06 PROCEDURE — 81025 URINE PREGNANCY TEST: CPT | Performed by: PHYSICIAN ASSISTANT

## 2018-09-06 PROCEDURE — 62305 MYELOGRAPHY LUMBAR INJECTION: CPT

## 2018-09-06 RX ORDER — SODIUM CHLORIDE 9 MG/ML
25 INJECTION, SOLUTION INTRAVENOUS CONTINUOUS
Status: DISCONTINUED | OUTPATIENT
Start: 2018-09-06 | End: 2018-09-07 | Stop reason: HOSPADM

## 2018-09-06 RX ORDER — LIDOCAINE HYDROCHLORIDE 10 MG/ML
5 INJECTION, SOLUTION INFILTRATION; PERINEURAL
Status: COMPLETED | OUTPATIENT
Start: 2018-09-06 | End: 2018-09-06

## 2018-09-06 RX ADMIN — SODIUM CHLORIDE 25 ML/HR: 0.9 INJECTION, SOLUTION INTRAVENOUS at 12:32

## 2018-09-06 RX ADMIN — LIDOCAINE HYDROCHLORIDE 5 ML: 10 INJECTION, SOLUTION INFILTRATION; PERINEURAL at 14:05

## 2018-09-06 RX ADMIN — IOHEXOL 12 ML: 240 INJECTION, SOLUTION INTRATHECAL; INTRAVASCULAR; INTRAVENOUS; ORAL at 14:04

## 2018-09-06 NOTE — DISCHARGE INSTRUCTIONS
Myelogram   WHAT YOU NEED TO KNOW:   Myelogram, also called myelography, is a procedure that uses an x-ray to examine your spinal canal  Contrast dye is used to help healthcare providers see your nerves, bones, or spinal cord more clearly  DISCHARGE INSTRUCTIONS:   Follow up with your healthcare provider or specialist as directed:  Write down your questions so you remember to ask them during your visits  Drink liquids as directed:  Liquids will help flush the contrast dye out of your body  Ask how much liquid to drink each day, and which liquids to drink  Some foods, such as soup and fruit, also provide liquid  Contact your healthcare provider or specialist if:   · You have bleeding or a discharge coming from where the needle was put into your back  · You have severe neck or back pain  · You have a headache or nausea that does not go away with rest and medicine  · You feel anxious or irritable  · You have questions or concerns about your condition or care  Seek care immediately or call 911 if:   · You have a stiff neck or have trouble thinking clearly  · You have numbness, tingling, or weakness anywhere below your waist     · You have a severe headache that does not get better  · You have a fever  © 2017 [employer]  Information is for End User's use only and may not be sold, redistributed or otherwise used for commercial purposes  All illustrations and images included in CareNotes® are the copyrighted property of A [employer] M [employer]  or [employer] Alonso  The above information is an  only  It is not intended as medical advice for individual conditions or treatments  Talk to your doctor, nurse or pharmacist before following any medical regimen to see if it is safe and effective for you

## 2018-09-06 NOTE — PROGRESS NOTES
THORACIC AND LUMBAR MYELOGRAM    INDICATION: History of L1 burst fracture and spinal cord injury status post corpectomy and cage placement of T12-L2 in 2013  Recent imaging studies of failed hardware at T12 screws  Patient with persistent thoracic pain  COMPARISON: Lumbar spine x-ray on August 22, 2018 and MRI of the lumbar spine without contrast on January 8, 2017  FLUOROSCOPY TIME:  2 0 MFT    IMAGES: 10    TECHNIQUE:    After fully explaining the risks, benefits and alternatives of the procedure to the Patient , consent was obtained  The skin overlying the lumbar spine was prepped and draped in the usual sterile fashion  1% lidocaine was infiltrated at the puncture site  Under fluoroscopic guidance a 22 gauge spinal needle was inserted into the thecal sac at the L2-L3 interlaminar space  12 cc of Omnipaque 240 mg contrast was injected into the thecal sac under fluoroscopy  The needle was removed  Post myelogram plain films and CT were obtained  Please see separate dictation for CT results  No post-procedure complications  The patient was given appropriate instructions  This procedure was performed by Carlos Tao PA-C under the direct supervision of Dr Rubens Gonzalez

## 2018-09-06 NOTE — TELEPHONE ENCOUNTER
----- Message from Tiago Milian MD sent at 9/5/2018  6:34 PM EDT -----  Can you please let patient know that her iron studies as well as vitamin B12 were within normal range    Thank you

## 2018-09-10 NOTE — PROGRESS NOTES
9/11/2018    Enzo Rolon  1985  [id number]        Assessment  -Urinary urgency~possible neurogenic bladder    Discussion/Plan  Kvng Haider is a 29 y o  female being managed by Dr Mateo Gallardo  -PVR is 36cc  -We discussed additional options for patient's urinary urgency and urge incontinence  Unfortunately patient's medical assistance plan will not allow our office to allow self pay for PTNS  I also discussed options such as Botox injections and InterStim device  Patient is not a good candidate for InterStim as she still requires MRI for history of neurologic injury  Provided patient with information on Botox injections  She defers at this time but will consider  Patient will continue to take oxybutynin 10 mg and Myrbetriq 50 mg daily  She will otherwise follow-up in 1 year or sooner if needed  Patient instructed to call with any issues  -All questions answered, patients agree with plan     History of Present Illness  29 y o  female with a history of urinary urgency presents today for follow up  Patient continues to take oxybutynin 10mg and Myrbetriq 50mg daily for urinary symptoms urinary urgency and frequency  She had expressed interest in PTNS, but unfortunately patient's insurance does not offer PTNS  Patient reports mild improvement with medication management but continues to wear up to 5 sanitary pads a day as well as sanitary briefs at night  She states that she often changes pads as habit when using the restroom but occasionally finds they are saturated  She denies any episodes of gross hematuria or dysuria  She feels she empties bladder to completion  No overall changes in health since last office visit  Patient underwent negative cystoscopy by Dr Mateo Gallardo on 1/2017  She has history of neurologic injury after sustaining fall 3 years ago  Review of Systems  Review of Systems   Constitutional: Negative  HENT: Negative      Respiratory: Negative  Cardiovascular: Negative  Gastrointestinal: Negative  Genitourinary: Positive for urgency  Negative for decreased urine volume, difficulty urinating, dysuria, flank pain, frequency and hematuria  Musculoskeletal: Positive for gait problem  Skin: Negative  Psychiatric/Behavioral: Negative  Past Medical History  Past Medical History:   Diagnosis Date    Anemia     Anxiety     Depression     Foot drop     Mental disorder     Small bowel problem     Spinal cord injury at C1-C4 level [facility])        Past Social History  Past Surgical History:   Procedure Laterality Date    CYSTOSCOPY      Diagnostic; onset: 1/3/17    FL MYELOGRAM 2 OR MORE REGIONS  9/6/2018    NM COLONOSCOPY FLX DX W/COLLJ SPEC WHEN PFRMD N/A 12/12/2017    Procedure: COLONOSCOPY;  Surgeon: Andrew Lepe MD;  [facility]; Service: Gastroenterology    SPINAL FUSION         Past Family History  Family History   Problem Relation Age of Onset    Diabetes Father     Lymphoma Father         Waldenstrom's    Heart disease Father         cardiac disorder    Hypertension Father     Pulmonary embolism Father     Hyperlipidemia Father     Stroke Family        Past Social history  Social History     Social History    Marital status: Single     Spouse name: N/A    Number of children: N/A    Years of education: N/A     Occupational History    Not on file       Social History Main Topics    Smoking status: Former Smoker     Packs/day: 1 00     Quit date: 12/12/2016    Smokeless tobacco: Never Used      Comment: current every day smoker, former smoker and smokes less than 1 ppd as per [employer]    Alcohol use Yes      Comment: 2 wkly    Drug use: No      Comment: Drug use as per [employer]    Sexual activity: Not on file     Other Topics Concern    Not on file     Social History Narrative    No narrative on file       Current Medications  Current Outpatient Prescriptions   Medication Sig Dispense Refill    busPIRone (BUSPAR) 10 mg tablet Take 1 tablet by mouth 3 (three) times a day      carBAMazepine (TEGretol) 200 mg tablet Take 200 mg by mouth 2 (two) times a day        hydrOXYzine HCL (ATARAX) 25 mg tablet Take 25 mg by mouth every 6 (six) hours as needed for itching      levonorgestrel (MIRENA) 20 MCG/24HR IUD 20 mcg/day      Mirabegron ER (MYRBETRIQ) 50 MG TB24 Take by mouth daily      oxybutynin (DITROPAN-XL) 5 mg 24 hr tablet Take 1 tablet (5 mg total) by mouth daily 30 tablet 11     No current facility-administered medications for this visit  Allergies  Allergies   Allergen Reactions    Sulfamethoxazole Nausea Only and GI Intolerance       Past medical history, social history, family history, medications and allergies were reviewed  Vitals  There were no vitals filed for this visit  Physical Exam  Physical Exam   Constitutional: She is oriented to person, place, and time  She appears well-developed and well-nourished  HENT:   Head: Normocephalic  Eyes: Pupils are equal, round, and reactive to light  Neck: Normal range of motion  Cardiovascular: Normal rate and regular rhythm  Pulmonary/Chest: Effort normal    Abdominal: Soft  Normal appearance  She exhibits no distension  There is no tenderness  There is no CVA tenderness  Musculoskeletal:   Gait unsteady, ambulates with cane   Neurological: She is alert and oriented to person, place, and time  Skin: Skin is warm and dry  Psychiatric: She has a normal mood and affect   Her behavior is normal  Judgment and thought content normal        Results    I have personally reviewed all pertinent lab results and reviewed with patient  Lab Results   Component Value Date    GLUCOSE 92 12/28/2015    CALCIUM 9 1 03/06/2018     06/21/2017    K 4 1 06/21/2017    CO2 28 03/06/2018     03/06/2018    BUN 12 03/06/2018    CREATININE 0 55 03/06/2018     Lab Results   Component Value Date    WBC 3 9 03/06/2018    HGB 13 4 03/06/2018    HCT 39 3 03/06/2018    MCV 94 2 03/06/2018     09/06/2018     No results found for this or any previous visit (from the past 1 hour(s))

## 2018-09-11 ENCOUNTER — OFFICE VISIT (OUTPATIENT)
Dept: UROLOGY | Facility: AMBULATORY SURGERY CENTER | Age: 33
End: 2018-09-11
Payer: COMMERCIAL

## 2018-09-11 VITALS
BODY MASS INDEX: 21.21 KG/M2 | HEART RATE: 84 BPM | WEIGHT: 124.2 LBS | HEIGHT: 64 IN | DIASTOLIC BLOOD PRESSURE: 68 MMHG | SYSTOLIC BLOOD PRESSURE: 110 MMHG

## 2018-09-11 DIAGNOSIS — R32 URINARY INCONTINENCE, UNSPECIFIED TYPE: Primary | ICD-10-CM

## 2018-09-11 LAB
POST-VOID RESIDUAL VOLUME, ML POC: 36 ML
SL AMB  POCT GLUCOSE, UA: NORMAL
SL AMB LEUKOCYTE ESTERASE,UA: NORMAL
SL AMB POCT BILIRUBIN,UA: NORMAL
SL AMB POCT BLOOD,UA: NORMAL
SL AMB POCT CLARITY,UA: CLEAR
SL AMB POCT COLOR,UA: YELLOW
SL AMB POCT KETONES,UA: NORMAL
SL AMB POCT NITRITE,UA: NORMAL
SL AMB POCT PH,UA: 6.5
SL AMB POCT SPECIFIC GRAVITY,UA: 1.01
SL AMB POCT URINE PROTEIN: 30
SL AMB POCT UROBILINOGEN: NORMAL

## 2018-09-11 PROCEDURE — 99213 OFFICE O/P EST LOW 20 MIN: CPT | Performed by: NURSE PRACTITIONER

## 2018-09-11 PROCEDURE — 51798 US URINE CAPACITY MEASURE: CPT | Performed by: NURSE PRACTITIONER

## 2018-09-11 PROCEDURE — 81002 URINALYSIS NONAUTO W/O SCOPE: CPT | Performed by: NURSE PRACTITIONER

## 2018-09-12 ENCOUNTER — OFFICE VISIT (OUTPATIENT)
Dept: OBGYN CLINIC | Facility: CLINIC | Age: 33
End: 2018-09-12
Payer: COMMERCIAL

## 2018-09-12 VITALS
HEART RATE: 76 BPM | HEIGHT: 64 IN | DIASTOLIC BLOOD PRESSURE: 68 MMHG | SYSTOLIC BLOOD PRESSURE: 110 MMHG | WEIGHT: 124 LBS | BODY MASS INDEX: 21.17 KG/M2

## 2018-09-12 DIAGNOSIS — M54.16 LUMBAR RADICULOPATHY: ICD-10-CM

## 2018-09-12 DIAGNOSIS — Z87.828 H/O SPINAL CORD INJURY: ICD-10-CM

## 2018-09-12 DIAGNOSIS — S34.101D INJURY TO L1 LEVEL OF SPINAL CORD, SUBSEQUENT ENCOUNTER (HCC): Primary | ICD-10-CM

## 2018-09-12 PROCEDURE — 99213 OFFICE O/P EST LOW 20 MIN: CPT | Performed by: ORTHOPAEDIC SURGERY

## 2018-09-12 NOTE — PROGRESS NOTES
Assessment/Plan:      Patient seen and examined with Dr Jaime Ewing  CT myelogram of the thoracic and lumbar spine re-demonstrates broken screws at T12 as well as scar tissue formation  No other obvious abnormalities seen to attribute to her symptoms  At this time we will order an MRI of the thoracic spine without contrast for further evaluation of any disc degeneration at the level of her pain  He can call the patient with the results, or she can follow up in the office to review the study  Problem List Items Addressed This Visit     H/O spinal cord injury    Injury to L1 level of spinal cord ([facility] [address] ) - Primary    Lumbar radiculopathy            Subjective:      Patient ID: Cristiana Jefferson is a 29 y o  female  HPI    Patient is a 33-year-old female presents for follow-up appointment  She was last seen about three weeks ago for chronic left-sided thoracic pain for several years that is progressively worsening over time  She has a significant history of fall with subsequent L1 burst fracture with complete injury at the conus  This occurred in 2013  At that time she had no motor to the lower extremities and was taken for urgent decompression and fixation of the spine  She underwent anterior lumbar corpectomy at L1, and anterior spinal fusion T12-L2  This was done by Dr Susan Ga  Today she is here for imaging review of CT myelogram, thoracic and lumbar spine  Today the patient states her symptoms since last visit are largely unchanged however they have migrated to me midline    The following portions of the patient's history were reviewed and updated as appropriate: allergies, current medications, past family history, past medical history, past social history, past surgical history and problem list     Review of Systems   Constitutional: Negative for chills, diaphoresis, fatigue and fever  Cardiovascular: Negative  Genitourinary: Negative for decreased urine volume and difficulty urinating  Musculoskeletal: Positive for arthralgias, back pain and gait problem  Skin: Negative  Neurological: Negative for weakness and numbness  Objective:      /68   Pulse 76   Ht 5' 4" (1 626 m)   Wt 56 2 kg (124 lb)   BMI 21 28 kg/m²          Physical Exam   Constitutional: She is oriented to person, place, and time  She appears well-developed and well-nourished  No distress  Pulmonary/Chest: Effort normal    Musculoskeletal: She exhibits tenderness  Neurological: She is alert and oriented to person, place, and time  Skin: Skin is warm and dry  She is not diaphoretic  Psychiatric: She has a normal mood and affect  Nursing note and vitals reviewed  No acute distress  Gait is slow and assisted with a cane  Inspection reveals left lateral thoracic well-healed old incisions  There is obvious atrophy of the bilateral lower extremities  Left paraspinal muscle thoracic musculature is tender to palpation  Negative modified straight leg raise bilaterally  Strength is 5/5 L2-L4 bilaterally  She has chronic footdrop bilaterally    Sensation equal intact L2-S1 bilaterally

## 2018-09-13 ENCOUNTER — TELEPHONE (OUTPATIENT)
Dept: UROLOGY | Facility: CLINIC | Age: 33
End: 2018-09-13

## 2018-09-13 NOTE — TELEPHONE ENCOUNTER
Patient of Dr Yoli Menendez, h/o urinary urgency and urge incontinence    Currently taking oxybutynin and myrbetriq yes

## 2018-09-13 NOTE — TELEPHONE ENCOUNTER
Called and spoke with patient  Reviewed insurance authorization information  Reviewed the treatment and the need for bladder diary  She is still interested in scheduling  Patient scheduled to start 9/25 at 1 pm   She has access to see all the appt in My chart and will print the entire list at her first visit  Patient verbalized understanding and agrees to plan

## 2018-09-13 NOTE — TELEPHONE ENCOUNTER
----- Message from Mely Chase sent at 9/13/2018  7:55 AM EDT -----  Hi  PTNS is approved with the insurance auth# [id number] good for 12 visits starting 9/25/18-12/18/18, I will track this patient to make sure we are getting payment, she can be scheduled  Thanks  Shanti  ----- Message -----  From: EUSEBIA Thomson  Sent: 9/11/2018   3:05 PM  To: Mely Chase        ----- Message -----  From: Mely Chase  Sent: 9/11/2018   2:48 PM  To: EUSEBIA Thomson  Did the patient tell you that her insurance will not cover PTNS? [facility] will authorize PTNS as long as the medical criteria is met we just sometimes do not get paid for them even though the insurance approves it we are working on this  Thanks  Shanti    ----- Message -----  From: EUSEBIA Thomson  Sent: 9/11/2018   2:39 PM  To: Mely Chase    Recently saw patient for evaluation of urinary urgency after sustaining neurologic injury  Unfortunately, patient is on medical assistance plan which will not allow coverage of PTNS  Patient is asking if you know of any other medical assistance plans that do cover PTNS, as she stated she would perhaps change plans to get PTNS

## 2018-09-25 ENCOUNTER — PROCEDURE VISIT (OUTPATIENT)
Dept: UROLOGY | Facility: AMBULATORY SURGERY CENTER | Age: 33
End: 2018-09-25
Payer: COMMERCIAL

## 2018-09-25 VITALS
BODY MASS INDEX: 21.27 KG/M2 | DIASTOLIC BLOOD PRESSURE: 60 MMHG | SYSTOLIC BLOOD PRESSURE: 100 MMHG | WEIGHT: 124.6 LBS | HEART RATE: 64 BPM | HEIGHT: 64 IN

## 2018-09-25 DIAGNOSIS — R39.15 URINARY URGENCY: ICD-10-CM

## 2018-09-25 DIAGNOSIS — N39.41 URGE INCONTINENCE: Primary | ICD-10-CM

## 2018-09-25 PROCEDURE — 64566 NEUROELTRD STIM POST TIBIAL: CPT

## 2018-09-25 NOTE — PROGRESS NOTES
2018    Nuvia Gorenoel  1985  [id number]    Diagnosis  Chief Complaint     Urinary Urgency; Urinary Incontinence         Patient presents for PTNS  12 managed by Dr Kishor Whalen  Patient will follow up in one week for next treatment  Urinary Incontinence Screening      Most Recent Value   Urinary Incontinence   Urinary Incontinence? Yes [urge 1-2 accidents per day, 1- 3 pads]   Incomplete emptying? Yes   Urinary frequency? Yes [occ]   Urinary urgency? Yes [biggest complaint]   Urinary hesitancy? Yes   Dysuria (painful difficult urination)? No   Nocturia (waking up to use the bathroom)? No [just occ 1-2 x]   Straining (having to push to go)? Yes   Weak stream?  Yes   Intermittent stream?  No   Post void dribbling? Yes          Procedure PTNS  Session 1 of 12    Ankle used: right  Treatment settin  Duration of treatment: 30 minutes  Lead lot #:[id number]  Expiration Date:2021  Feeling/Response:sole of the foot and needle site    Patient Goals and Progress  Decreased Urgency Yes, main goal  Decreased Frequency No, not a complaint  Episodes of incontinence Yes, secondary goal  Sleep Through Night No, not a complaint  Related Health and Social Factors Yes, spinal cord injury    Bladder Diary Summary:  Caffeine cups per day: 1 cup of coffee  Alcohol- number of drinks per day:1-2 glasses per week  Daytime voids:10 on average  Nighttime voids:only occasional 1-2  Urgency:generally a 3-4 on a 1-5 scale  Incontinence- episodes per day:1-2 accidents with 1-3 pads per day    Treatment Plan    Decrease Caffeine Yes  Urge reduction techniques Yes  Timed voiding Yes    Patient does report some coffee daily however this helps with her bowel routine with spinal cord injury  Discussed coffee and caffeine effects on the bladder  Also discussed timed voiding and urge reduction techniques      Eleazar Masters, RN   LUZ ValenzuelaN

## 2018-10-02 ENCOUNTER — PROCEDURE VISIT (OUTPATIENT)
Dept: UROLOGY | Facility: AMBULATORY SURGERY CENTER | Age: 33
End: 2018-10-02
Payer: COMMERCIAL

## 2018-10-02 VITALS
SYSTOLIC BLOOD PRESSURE: 99 MMHG | DIASTOLIC BLOOD PRESSURE: 60 MMHG | WEIGHT: 125 LBS | HEART RATE: 88 BPM | BODY MASS INDEX: 21.34 KG/M2 | HEIGHT: 64 IN

## 2018-10-02 DIAGNOSIS — N39.41 URGE INCONTINENCE: ICD-10-CM

## 2018-10-02 DIAGNOSIS — R39.15 URINARY URGENCY: ICD-10-CM

## 2018-10-02 PROCEDURE — 64566 NEUROELTRD STIM POST TIBIAL: CPT

## 2018-10-02 NOTE — PROGRESS NOTES
10/2/2018    Mario Cooney  1985  [id number]    Diagnosis  Chief Complaint     Urinary Urgency; Urinary Incontinence         Patient presents for PTNS 2 of 12 managed by Dr Zelda Escamilla  Patient will follow up as scheduled in one week for next treatment  Urinary Incontinence Screening      Most Recent Value   Urinary Incontinence   Urinary Incontinence? Yes [1-2 accidents per day, 2-3 pads]   Incomplete emptying? No   Urinary frequency? No   Urinary urgency? Yes   Urinary hesitancy? Yes   Dysuria (painful difficult urination)? No   Nocturia (waking up to use the bathroom)? No   Straining (having to push to go)? Yes   Weak stream?  Yes   Intermittent stream?  No   Post void dribbling? No            Procedure PTNS  Session 2 of 12    Ankle used: right  Treatment settin  Duration of treatment: 30 minutes  Lead lot #:[id number]  Expiration Date:2021  Feeling/Response: sensation on the outside of his foot        Nitesh Pink, LUZ MedinaN

## 2018-10-04 ENCOUNTER — HOSPITAL ENCOUNTER (OUTPATIENT)
Dept: MRI IMAGING | Facility: HOSPITAL | Age: 33
Discharge: HOME | End: 2018-10-04
Payer: COMMERCIAL

## 2018-10-04 DIAGNOSIS — S34.101D INJURY TO L1 LEVEL OF SPINAL CORD, SUBSEQUENT ENCOUNTER (HCC): ICD-10-CM

## 2018-10-04 DIAGNOSIS — Z87.828 H/O SPINAL CORD INJURY: ICD-10-CM

## 2018-10-04 PROCEDURE — 72146 MRI CHEST SPINE W/O DYE: CPT

## 2018-10-09 ENCOUNTER — PROCEDURE VISIT (OUTPATIENT)
Dept: UROLOGY | Facility: AMBULATORY SURGERY CENTER | Age: 33
End: 2018-10-09
Payer: COMMERCIAL

## 2018-10-09 VITALS
HEIGHT: 64 IN | WEIGHT: 121.8 LBS | DIASTOLIC BLOOD PRESSURE: 80 MMHG | BODY MASS INDEX: 20.79 KG/M2 | HEART RATE: 88 BPM | SYSTOLIC BLOOD PRESSURE: 128 MMHG

## 2018-10-09 DIAGNOSIS — N39.41 URGE INCONTINENCE: ICD-10-CM

## 2018-10-09 DIAGNOSIS — R39.15 URINARY URGENCY: ICD-10-CM

## 2018-10-09 PROCEDURE — 64566 NEUROELTRD STIM POST TIBIAL: CPT

## 2018-10-09 NOTE — PROGRESS NOTES
10/9/2018    Maninder Ortega  1985  [id number]    Diagnosis  Chief Complaint     Urinary Urgency; Urinary Incontinence         Patient presents for PTNS 3 of 12 managed by Dr Keshawn Haskins  Patient will follow up as scheduled in 1 week for her next treatment  Urinary Incontinence Screening      Most Recent Value   Urinary Incontinence   Urinary Incontinence? Yes [1-2 accidents per day, 2-3 pads]   Incomplete emptying? No   Urinary frequency? No   Urinary urgency? Yes [may be slightly worse this week]   Urinary hesitancy? No   Dysuria (painful difficult urination)? No   Nocturia (waking up to use the bathroom)? No   Straining (having to push to go)? Yes   Weak stream?  Yes   Intermittent stream?  No   Post void dribbling? No            Procedure PTNS  Session 3 of 12    Ankle used: right  Treatment settin  Duration of treatment: 30 minutes  Lead lot #:[id number]  Expiration Date:2021  Feeling/Response:top of the foot    Patient does note that symptoms seem to be less predicatable the past week with increased urgency  She denies dietary changes but does report that she has had some recent stress  Reviewed that stress can affect urinary symptoms and reviewed general stress relief techniques  Will continue to assess weekly          AMBROCIO Abad BSN

## 2018-10-16 ENCOUNTER — PROCEDURE VISIT (OUTPATIENT)
Dept: UROLOGY | Facility: AMBULATORY SURGERY CENTER | Age: 33
End: 2018-10-16
Payer: COMMERCIAL

## 2018-10-16 VITALS
BODY MASS INDEX: 20.83 KG/M2 | HEIGHT: 64 IN | DIASTOLIC BLOOD PRESSURE: 60 MMHG | SYSTOLIC BLOOD PRESSURE: 105 MMHG | HEART RATE: 88 BPM | WEIGHT: 122 LBS

## 2018-10-16 DIAGNOSIS — N39.41 URGE INCONTINENCE: ICD-10-CM

## 2018-10-16 DIAGNOSIS — R39.15 URINARY URGENCY: ICD-10-CM

## 2018-10-16 PROCEDURE — 64566 NEUROELTRD STIM POST TIBIAL: CPT

## 2018-10-16 NOTE — PROGRESS NOTES
10/16/2018    Olive Chandra  1985  [id number]    Diagnosis  Chief Complaint     Urinary Urgency; Urinary Incontinence         Patient presents for PTNS 4 of 12 managed by Dr Chelsea Sutherland  Patient will follow up in in one week for next treatment    Urinary Incontinence Screening      Most Recent Value   Urinary Incontinence   Urinary Incontinence? Yes [1 accident per day, 1-2 pads per day]   Incomplete emptying? No   Urinary frequency? No   Urinary urgency? Yes   Urinary hesitancy? Yes   Dysuria (painful difficult urination)? No   Nocturia (waking up to use the bathroom)? No   Straining (having to push to go)? Yes   Weak stream?  Yes   Intermittent stream?  No   Post void dribbling? No            Procedure PTNS  Session 4 of 12    Ankle used: right  Treatment settin  Duration of treatment: 30 minutes  Lead lot #:[id number]  Expiration Date:2021  Feeling/Response:sensation in foot    Patient notes maybe possible improvement this week but cannot really pinpoint it      AMBROCIO Suarez BSN

## 2018-10-23 ENCOUNTER — PROCEDURE VISIT (OUTPATIENT)
Dept: UROLOGY | Facility: AMBULATORY SURGERY CENTER | Age: 33
End: 2018-10-23
Payer: COMMERCIAL

## 2018-10-23 VITALS
HEIGHT: 64 IN | WEIGHT: 124.2 LBS | DIASTOLIC BLOOD PRESSURE: 60 MMHG | BODY MASS INDEX: 21.21 KG/M2 | HEART RATE: 64 BPM | SYSTOLIC BLOOD PRESSURE: 99 MMHG

## 2018-10-23 DIAGNOSIS — R39.15 URINARY URGENCY: ICD-10-CM

## 2018-10-23 DIAGNOSIS — N39.41 URGE INCONTINENCE: ICD-10-CM

## 2018-10-23 PROCEDURE — 64566 NEUROELTRD STIM POST TIBIAL: CPT

## 2018-10-23 NOTE — PROGRESS NOTES
10/23/2018    Olive Chandra  1985  [id number]    Diagnosis  Chief Complaint     Urinary Urgency; Urinary Frequency         Patient presents for PTNS 5 of 12 managed by Dr Chelsea Sutherland  Patient will follow up in one week for next treatment    Urinary Incontinence Screening      Most Recent Value   Urinary Incontinence   Urinary Incontinence? Yes [1 accident per day, not every day, 1-2 pad per day]   Incomplete emptying? No   Urinary frequency? No   Urinary urgency? Yes   Urinary hesitancy? Yes   Dysuria (painful difficult urination)? No   Nocturia (waking up to use the bathroom)? No   Straining (having to push to go)? Yes   Weak stream?  Yes   Intermittent stream?  No   Post void dribbling? No            Procedure PTNS  Session 5 of 12    Ankle used: right  Treatment settin  Duration of treatment: 30 minutes  Lead lot #:[id number]  Expiration Date:2021  Feeling/Response:toe and ankle sensation    Patient notes mild improvement to incontinence at this point      Tuyet Gregg RN  LUZ AminN

## 2018-10-25 ENCOUNTER — TELEPHONE (OUTPATIENT)
Dept: OBGYN CLINIC | Facility: HOSPITAL | Age: 33
End: 2018-10-25

## 2018-10-25 NOTE — TELEPHONE ENCOUNTER
Dr Nathaly Frazier    Patient is calling because she has not heard back about her MRI results  If there is nothing wrong then she would like a call  If there is and she needs to make an appt she will gladly come into the office

## 2018-10-25 NOTE — TELEPHONE ENCOUNTER
Yes she can come into the office to review the results in person  Can you please arrange a follow-up appointment for this patient next week with Dr Ashley Estrada  Thank you

## 2018-10-29 ENCOUNTER — OFFICE VISIT (OUTPATIENT)
Dept: OBGYN CLINIC | Facility: HOSPITAL | Age: 33
End: 2018-10-29
Payer: COMMERCIAL

## 2018-10-29 VITALS
DIASTOLIC BLOOD PRESSURE: 73 MMHG | WEIGHT: 124 LBS | HEART RATE: 83 BPM | HEIGHT: 64 IN | SYSTOLIC BLOOD PRESSURE: 108 MMHG | BODY MASS INDEX: 21.17 KG/M2

## 2018-10-29 DIAGNOSIS — G89.29 CHRONIC BILATERAL THORACIC BACK PAIN: Primary | ICD-10-CM

## 2018-10-29 DIAGNOSIS — M54.6 CHRONIC BILATERAL THORACIC BACK PAIN: Primary | ICD-10-CM

## 2018-10-29 DIAGNOSIS — S34.101D INJURY TO L1 LEVEL OF SPINAL CORD, SUBSEQUENT ENCOUNTER (HCC): ICD-10-CM

## 2018-10-29 PROCEDURE — 99213 OFFICE O/P EST LOW 20 MIN: CPT | Performed by: ORTHOPAEDIC SURGERY

## 2018-10-29 NOTE — ASSESSMENT & PLAN NOTE
Patient presents for follow-up regarding chronic thoracic back pain  On today's visit her pain is primarily localized to the periscapular area  She does have history of previous L1 burst fracture treated with fusion  She has been found subsequently to have broken screws in her construct even though her cage remains well seated  Recent MRI of the thoracic spine is reviewed here in the office with the patient today  This demonstrates postsurgical changes including a cyst at the level of the conus with mild anterior displacement of the conus without significant compression    I have explained to her in great detail that surgical intervention for broken screws in the way of additional fusion may not predictably address her thoracic back pain which in fact is more cephalad  This seems to be muscular in origin  Her neurological exam is grossly unchanged in the last several months in years    I recommend conservative management

## 2018-10-29 NOTE — PROGRESS NOTES
Assessment/Plan:  Patient presents for follow-up regarding chronic thoracic back pain  On today's visit her pain is primarily localized to the periscapular area  She does have history of previous L1 burst fracture treated with fusion  She has been found subsequently to have broken screws in her construct even though her cage remains well seated  Recent MRI of the thoracic spine is reviewed here in the office with the patient today  This demonstrates postsurgical changes including a cyst at the level of the conus with mild anterior displacement of the conus without significant compression    I have explained to her in great detail that surgical intervention for broken screws in the way of additional fusion may not predictably address her thoracic back pain which in fact is more cephalad  This seems to be muscular in origin  Her neurological exam is grossly unchanged in the last several months in years  I recommend conservative management  · Surgery not recommended  · Can consider 2nd opinion  · Follow up PRN     Diagnoses and all orders for this visit:    Chronic bilateral thoracic back pain    Injury to L1 level of spinal cord, subsequent encounter ([facility])          Subjective:      Patient ID: Laci Day is a 34 y o  female  HPI   The patient presents for follow up of thoracic pain  She has a history of spinal cord injury following a burst fracture with subsequent reconstruction of the thoracolumbar spine  CT myelogram of thoracolumbar spin demonstrates previous instrumentation, Dr Hilario  Today she complains of bilateral periscapular pain and thoracic pain, leg weakness and numbness into the feet  She rates her pain level 6 5/10 and and 8/10 at its worse  She rarely uses diclofenac and a muscle relaxer as they do not help  Again she has done physical therapy without benefit  She currently does yoga for a HEP with some benefit  She does wear an AFO on the right ankle      The following portions of the patient's history were reviewed and updated as appropriate: current medications, past family history, past medical history, past social history, past surgical history and problem list     Review of Systems   Constitutional: Negative for chills, fever and unexpected weight change  HENT: Negative for hearing loss, nosebleeds and sore throat  Eyes: Negative for pain, redness and visual disturbance  Respiratory: Negative for cough, shortness of breath and wheezing  Cardiovascular: Negative for chest pain, palpitations and leg swelling  Gastrointestinal: Negative for abdominal pain, nausea and vomiting  Genitourinary: Negative for dyspareunia, dysuria and frequency  Musculoskeletal: Positive for back pain and gait problem  Skin: Negative for rash and wound  Neurological: Negative for dizziness, numbness and headaches  Psychiatric/Behavioral: Negative for decreased concentration and suicidal ideas  The patient is not nervous/anxious  Objective:      /73   Pulse 83   Ht 5' 4" (1 626 m)   Wt 56 2 kg (124 lb)   BMI 21 28 kg/m²          Physical Exam   Constitutional: She is oriented to person, place, and time  She appears well-developed and well-nourished  HENT:   Head: Normocephalic  Eyes: Pupils are equal, round, and reactive to light  Conjunctivae and EOM are normal    Neck: Normal range of motion  Cardiovascular: Normal rate  Pulmonary/Chest: Effort normal    Neurological: She is alert and oriented to person, place, and time  Skin: Skin is warm and dry         Patient ambulates with a cane  Obvious atrophy of bilateral lower extremities Left paracentral muscle of thoracic musculature is tender to palpation  Negative modified straight leg raise bilaterally  Strength 5/5 L2-L4  Chronic foot drop bilaterally  Sensation equally intact L2-S1 bilaterally  Inspection reveals left lateral thoracic well healed old incision    Scribe Attestation    I,:   Mary Abel am acting as a scribe while in the presence of the attending physician :        I,:   Gaby Barker MD personally performed the services described in this documentation    as scribed in my presence :

## 2018-10-30 ENCOUNTER — PROCEDURE VISIT (OUTPATIENT)
Dept: UROLOGY | Facility: AMBULATORY SURGERY CENTER | Age: 33
End: 2018-10-30
Payer: COMMERCIAL

## 2018-10-30 VITALS
DIASTOLIC BLOOD PRESSURE: 68 MMHG | BODY MASS INDEX: 21.13 KG/M2 | HEART RATE: 88 BPM | HEIGHT: 64 IN | SYSTOLIC BLOOD PRESSURE: 99 MMHG | WEIGHT: 123.8 LBS

## 2018-10-30 DIAGNOSIS — R39.15 URINARY URGENCY: ICD-10-CM

## 2018-10-30 DIAGNOSIS — N39.41 URGE INCONTINENCE: ICD-10-CM

## 2018-10-30 PROCEDURE — 64566 NEUROELTRD STIM POST TIBIAL: CPT

## 2018-10-30 NOTE — PROGRESS NOTES
10/30/2018    Vikas Edwards  1985  [id number]    Diagnosis  Chief Complaint     Urinary Urgency; Urinary Frequency         Patient presents for PTNS 6 of 12 managed by Dr Fiordaliza Armas  Patient will follow up in one week for the next treatment  Urinary Incontinence Screening      Most Recent Value   Urinary Incontinence   Urinary Incontinence? Yes [1-2 accidents per day, 2-3 pads]   Incomplete emptying? No   Urinary frequency? No [only with caffeine and alcohol]   Urinary urgency? Yes   Urinary hesitancy? Yes   Dysuria (painful difficult urination)? No   Nocturia (waking up to use the bathroom)? No   Straining (having to push to go)? Yes   Weak stream?  Yes   Intermittent stream?  No   Post void dribbling? No            Procedure PTNS  Session 6 of 12    Ankle used: right  Treatment settin  Duration of treatment: 30 minutes  Lead lot #:[id number]  Expiration Date:2021  Feeling/Response:toe sensation and ankle sensation    Patient thought last week she was starting to see improvement but this week seemed to be worse again  Will continue through at least week 8 with bladder diary to reassess effectiveness      AMBROCIO Pepe BSN

## 2018-11-01 ENCOUNTER — OFFICE VISIT (OUTPATIENT)
Dept: FAMILY MEDICINE CLINIC | Facility: CLINIC | Age: 33
End: 2018-11-01
Payer: COMMERCIAL

## 2018-11-01 VITALS
BODY MASS INDEX: 20.96 KG/M2 | TEMPERATURE: 99.1 F | SYSTOLIC BLOOD PRESSURE: 102 MMHG | WEIGHT: 122.8 LBS | DIASTOLIC BLOOD PRESSURE: 76 MMHG | HEIGHT: 64 IN | RESPIRATION RATE: 14 BRPM | HEART RATE: 84 BPM

## 2018-11-01 DIAGNOSIS — Z78.9 VEGETARIAN DIET: ICD-10-CM

## 2018-11-01 DIAGNOSIS — R39.15 URINARY URGENCY: ICD-10-CM

## 2018-11-01 DIAGNOSIS — R10.9 LEFT FLANK PAIN: ICD-10-CM

## 2018-11-01 DIAGNOSIS — E53.8 LOW VITAMIN B12 LEVEL: Primary | ICD-10-CM

## 2018-11-01 DIAGNOSIS — Z23 NEED FOR INFLUENZA VACCINATION: ICD-10-CM

## 2018-11-01 DIAGNOSIS — S34.101S: ICD-10-CM

## 2018-11-01 PROCEDURE — 90686 IIV4 VACC NO PRSV 0.5 ML IM: CPT

## 2018-11-01 PROCEDURE — 99214 OFFICE O/P EST MOD 30 MIN: CPT | Performed by: FAMILY MEDICINE

## 2018-11-01 PROCEDURE — 90471 IMMUNIZATION ADMIN: CPT

## 2018-11-01 NOTE — PROGRESS NOTES
FAMILY PRACTICE OFFICE VISIT       NAME: Tyrel Collins  AGE: 34 y o  SEX: female       : 1985        MRN: [id number]    DATE: 2018  TIME: 2:29 PM    Assessment and Plan     Problem List Items Addressed This Visit     Low vitamin B12 level - Primary     Overall stable on recent blood work  Patient is vegetarian  Continue increase vitamin B12 rich foods  Vegetarian diet     Iron studies as well as vitamin B12 overall stable  Continue to maintain adequate amount of protein in the diet as well  Injury to L1 level of spinal cord (HCC)    Left flank pain     Patient has had chronic thoracic back pain with a history of injury to L1 level of spinal cord  She does have a prior surgery with fusion and was noted to have displaced screws  Patient was evaluated by orthopedic surgeon/spine specialist who did not recommend surgery and pain is likely secondary to muscular origin  He has also offered a 2nd opinion  I feel this is reasonable as well  She does continue with exercises, stretches, yoga and is working on her posture as well  According to previous orthopedic note, neurological exam is stable  Patient also goes to [facility] and is monitored by PM&R physician as well  Urinary urgency     Has been getting ptns, next week will be 8 th  Will do bladder diary   Patient has also been offered information on Botox  Other Visit Diagnoses     Need for influenza vaccination        Relevant Orders    SYRINGE/SINGLE-DOSE VIAL: influenza vaccine, [id number], quadrivalent, 0 5 mL, preservative-free, for patients 3+ yr (FLUZONE) (Completed)            There are no Patient Instructions on file for this visit  Chief Complaint     Chief Complaint   Patient presents with    Follow-up     Patient is here for a follow-up   Flu Vaccine       History of Present Illness     HPI   61-year-old female here for follow-up of chronic conditions    Patient states she is doing well overall  Patient states she is planning on moving to [place] with her boyfriend who is already moved there 2 weeks ago and is hoping to go once he has settled down  Re  Back pain, saw pain specialist, had injections, did not help  Has done PT  Has been more carefull about posture  Has been trying do yoga  Pain is constant   Has been getting ptns, next week will be 8 th  Will do bladder diary     Review of Systems   Review of Systems   Constitutional: Negative for unexpected weight change  HENT: Negative  Respiratory: Negative for shortness of breath  Cardiovascular: Negative  Musculoskeletal: Positive for back pain  Psychiatric/Behavioral: Negative for dysphoric mood and sleep disturbance  Active Problem List     Patient Active Problem List   Diagnosis    Paronychia of toe of right foot    Constipation    Weight loss    H/O spinal cord injury    Chronic thoracic back pain    Low vitamin B12 level    Vegetarian diet    Injury to L1 level of spinal cord (HCC)    Bipolar affective disorder (HCC)    Lumbar radiculopathy    Myofascial pain    Left flank pain    Abnormal urinalysis    Urinary urgency       Past Medical History:  Past Medical History:   Diagnosis Date    Anemia     Anxiety     Depression     Foot drop     Mental disorder     Small bowel problem     Spinal cord injury at C1-C4 level [facility])        Past Surgical History:  Past Surgical History:   Procedure Laterality Date    CYSTOSCOPY      Diagnostic; onset: 1/3/17    FL MYELOGRAM 2 OR MORE REGIONS  9/6/2018    NC COLONOSCOPY FLX DX W/COLLJ SPEC WHEN PFRMD N/A 12/12/2017    Procedure: COLONOSCOPY;  Surgeon: Ivet Higuera MD;  [facility];   Service: Gastroenterology    SPINAL FUSION         Family History:  Family History   Problem Relation Age of Onset    Diabetes Father     Lymphoma Father         Waldenstrom's    Heart disease Father         cardiac disorder    Hypertension Father    Alejandrina Leisure Pulmonary embolism Father     Hyperlipidemia Father     Stroke Family        Social History:  Social History     Social History    Marital status: Single     Spouse name: N/A    Number of children: N/A    Years of education: N/A     Occupational History    Not on file  Social History Main Topics    Smoking status: Former Smoker     Packs/day: 1 00     Quit date: 12/12/2016    Smokeless tobacco: Never Used      Comment: current every day smoker, former smoker and smokes less than 1 ppd as per [employer]    Alcohol use Yes      Comment: 2 wkly    Drug use: No      Comment: Drug use as per [employer]    Sexual activity: Not on file     Other Topics Concern    Not on file     Social History Narrative    No narrative on file     I have reviewed the patient's medical history in detail; there are no changes to the history as noted in the electronic medical record  Objective     Vitals:    11/01/18 1310   BP: 102/76   Pulse: 84   Resp: 14   Temp: 99 1 °F (37 3 °C)     Wt Readings from Last 3 Encounters:   11/01/18 55 7 kg (122 lb 12 8 oz)   10/30/18 56 2 kg (123 lb 12 8 oz)   10/29/18 56 2 kg (124 lb)         Physical Exam   Constitutional: She is oriented to person, place, and time  She appears well-developed and well-nourished  HENT:   Head: Normocephalic and atraumatic  Mouth/Throat: Oropharynx is clear and moist    Eyes: Pupils are equal, round, and reactive to light  Conjunctivae and EOM are normal    Neck: Normal range of motion  Neck supple  No thyromegaly present  Cardiovascular: Normal rate, regular rhythm and normal heart sounds  Pulmonary/Chest: Effort normal and breath sounds normal    Musculoskeletal: Normal range of motion  Lymphadenopathy:     She has no cervical adenopathy  Neurological: She is alert and oriented to person, place, and time  Psychiatric: She has a normal mood and affect  Nursing note and vitals reviewed        Pertinent Laboratory/Diagnostic Studies:  Lab Results   Component Value Date    GLUCOSE 92 12/28/2015    BUN 12 03/06/2018    CREATININE 0 62 06/21/2017    CALCIUM 9 1 03/06/2018     06/21/2017    K 4 0 03/06/2018    CO2 28 03/06/2018     03/06/2018     Lab Results   Component Value Date    ALT 15 06/21/2017    AST 14 06/21/2017    ALKPHOS 54 03/06/2018    BILITOT 0 5 06/21/2017       Lab Results   Component Value Date    WBC 3 9 03/06/2018    HGB 13 4 03/06/2018    HCT 39 3 03/06/2018    MCV 94 2 03/06/2018     09/06/2018       No results found for: TSH    Lab Results   Component Value Date    CHOL 181 08/23/2014     Lab Results   Component Value Date    TRIG 112 08/23/2014     Lab Results   Component Value Date    HDL 73 08/23/2014     Lab Results   Component Value Date    LDLCALC 86 08/23/2014     Lab Results   Component Value Date    HGBA1C 4 8 02/01/2017       Results for orders placed or performed in visit on 09/11/18   POCT Measure PVR   Result Value Ref Range    POST-VOID RESIDUAL VOLUME, ML POC 36 mL   POCT urine dip   Result Value Ref Range    LEUKOCYTE ESTERASE,UA neg     NITRITE,UA neg     SL AMB POCT UROBILINOGEN neg     POCT URINE PROTEIN 30      PH,UA 6 5     BLOOD,UA neg     SPECIFIC GRAVITY,UA 1 010     KETONES,UA neg     BILIRUBIN,UA neg     GLUCOSE, UA neg      COLOR,UA yellow     CLARITY,UA clear        Orders Placed This Encounter   Procedures    SYRINGE/SINGLE-DOSE VIAL: influenza vaccine, [id number], quadrivalent, 0 5 mL, preservative-free, for patients 3+ yr (FLUZONE)       ALLERGIES:  Allergies   Allergen Reactions    Sulfamethoxazole Nausea Only and GI Intolerance       Current Medications     Current Outpatient Prescriptions   Medication Sig Dispense Refill    busPIRone (BUSPAR) 10 mg tablet Take 1 tablet by mouth 3 (three) times a day      carBAMazepine (TEGretol) 200 mg tablet Take 200 mg by mouth 2 (two) times a day        hydrOXYzine HCL (ATARAX) 25 mg tablet Take 25 mg by mouth every 6 (six) hours as needed for itching      levonorgestrel (MIRENA) 20 MCG/24HR IUD 20 mcg/day      Mirabegron ER (MYRBETRIQ) 50 MG TB24 Take by mouth daily      oxybutynin (DITROPAN-XL) 5 mg 24 hr tablet Take 1 tablet (5 mg total) by mouth daily 30 tablet 11     No current facility-administered medications for this visit            Health Maintenance     Health Maintenance   Topic Date Due    Depression Screening PHQ  08/14/2019    CRC Screening: Colonoscopy  12/12/2022    DTaP,Tdap,and Td Vaccines (2 - Td) 04/19/2026    INFLUENZA VACCINE  Completed     Immunization History   Administered Date(s) Administered    Influenza 11/01/2015, 03/01/2017, 01/01/2018    Influenza Quadrivalent Preservative Free 3 years and older IM 01/18/2017, 10/03/2017    Influenza TIV (IM) 11/12/2015    Influenza, injectable, quadrivalent, preservative free 0 5 mL 11/01/2018    Tdap 04/19/2016       Jensen Mendez MD

## 2018-11-04 PROBLEM — R39.15 URINARY URGENCY: Status: ACTIVE | Noted: 2018-11-04

## 2018-11-04 NOTE — ASSESSMENT & PLAN NOTE
Overall stable on recent blood work  Patient is vegetarian  Continue increase vitamin B12 rich foods

## 2018-11-04 NOTE — ASSESSMENT & PLAN NOTE
Has been getting ptns, next week will be 8 th  Will do bladder diary   Patient has also been offered information on Botox

## 2018-11-04 NOTE — ASSESSMENT & PLAN NOTE
Iron studies as well as vitamin B12 overall stable  Continue to maintain adequate amount of protein in the diet as well

## 2018-11-04 NOTE — ASSESSMENT & PLAN NOTE
Patient has had chronic thoracic back pain with a history of injury to L1 level of spinal cord  She does have a prior surgery with fusion and was noted to have displaced screws  Patient was evaluated by orthopedic surgeon/spine specialist who did not recommend surgery and pain is likely secondary to muscular origin  He has also offered a 2nd opinion  I feel this is reasonable as well  She does continue with exercises, stretches, yoga and is working on her posture as well  According to previous orthopedic note, neurological exam is stable  Patient also goes to [facility] and is monitored by PM&R physician as well

## 2018-11-06 ENCOUNTER — PROCEDURE VISIT (OUTPATIENT)
Dept: UROLOGY | Facility: AMBULATORY SURGERY CENTER | Age: 33
End: 2018-11-06
Payer: COMMERCIAL

## 2018-11-06 VITALS
HEART RATE: 72 BPM | WEIGHT: 123.8 LBS | SYSTOLIC BLOOD PRESSURE: 98 MMHG | DIASTOLIC BLOOD PRESSURE: 70 MMHG | HEIGHT: 64 IN | BODY MASS INDEX: 21.13 KG/M2

## 2018-11-06 DIAGNOSIS — R39.15 URINARY URGENCY: ICD-10-CM

## 2018-11-06 DIAGNOSIS — N39.41 URGE INCONTINENCE: ICD-10-CM

## 2018-11-06 PROCEDURE — 64566 NEUROELTRD STIM POST TIBIAL: CPT

## 2018-11-06 NOTE — PROGRESS NOTES
2018    Rosamaria Fiore  1985  [id number]    Diagnosis  Chief Complaint     Urinary Urgency; Urinary Frequency         Patient presents for PTNS 7 of 12 managed by Dr Michael Whitaker  Patient will follow up in one week for next treatment with bladder diary to evaluate effectiveness  Urinary Incontinence Screening      Most Recent Value   Urinary Incontinence   Urinary Incontinence? Yes [0-1 per day, 1-2 pads]   Incomplete emptying? No   Urinary frequency? No   Urinary urgency? Yes [possible improvement]   Urinary hesitancy? Yes   Dysuria (painful difficult urination)? No   Nocturia (waking up to use the bathroom)? No   Straining (having to push to go)? Yes   Weak stream?  Yes   Intermittent stream?  No   Post void dribbling? No            Procedure PTNS  Session 7 of 12    Ankle used: right  Treatment settin  Duration of treatment: 30 minutes  Lead lot #:[id number]  Expiration Date:2021  Feeling/Response:ankle and foot sensation    Patient notes slight improvement to incontinence, and possibly urgency  She reports her symptoms are changing somewhat and she feels it is taking some time to relearn her body      Bonny Amaya, RN  SARAIN,BSN

## 2018-11-13 ENCOUNTER — PROCEDURE VISIT (OUTPATIENT)
Dept: UROLOGY | Facility: AMBULATORY SURGERY CENTER | Age: 33
End: 2018-11-13
Payer: COMMERCIAL

## 2018-11-13 VITALS
SYSTOLIC BLOOD PRESSURE: 110 MMHG | HEIGHT: 64 IN | DIASTOLIC BLOOD PRESSURE: 80 MMHG | HEART RATE: 80 BPM | WEIGHT: 124 LBS | BODY MASS INDEX: 21.17 KG/M2

## 2018-11-13 DIAGNOSIS — N32.81 OAB (OVERACTIVE BLADDER): ICD-10-CM

## 2018-11-13 DIAGNOSIS — R39.15 URINARY URGENCY: Primary | ICD-10-CM

## 2018-11-13 PROCEDURE — 99213 OFFICE O/P EST LOW 20 MIN: CPT | Performed by: NURSE PRACTITIONER

## 2018-11-13 NOTE — PROGRESS NOTES
11/13/2018    Radha Ames  1985  [id number]        Assessment  -OAB    Discussion/Plan  Valery Jaimes is a 34 y o  female being managed by Dr Eleazar Arriaga       -We discussed patient's lower urinary tract symptom of urgency and frequency  She was initially scheduled for PTNS 9 of 12 today, however because she states she has not noticed any improvement in urinary symptoms she defers at this time  We discussed other tertiary options including Botox injections and placement of InterStim device  Unfortunately, patient does require routine MRI for history of spinal cord injury  I did explain to patient that at this time, InterStim is not compatible with MRI  She states interest in Botox injections, and wishes to review further with Dr Eleazar Arriaga  Will schedule patient for office visit  She will continue to take Myrbetriq and oxybutynin daily  Also reviewed dietary and behavioral modifications including hydrating with water, avoiding bladder irritants, and practicing timed voiding   -All questions answered, patients agree with plan     History of Present Illness  34 y o  female with a history of OAB presents today for follow up  She presents today for PTNS 8 of 12  She continues to take oxybutynin 10mg and Myrbetriq 50mg daily for urinary urgency and frequency  She was initially wearing 5 sanitary pads during the day, and briefs at night  Patient is now down to 1-2 sanitary pads daily  Unfortunately, despite medication and PTNS she states that she still experiences up to 12 episodes of frequency during the day and up to 4 episode of urgency  She states that her symptoms are bothersome and has decreased her quality of life as she is unable to leave home without worrying about finding a restroom  Patient states that she drinks 1 8 oz cup of coffee in the morning, and drinks at least two 48 oz of water daily  She denies any episodes of gross hematuria or dysuria    She feels she is emptying her bladder to completion  Dysuria patient has history of neurologic injury secondary to fall 3 years ago  Her urinary symptoms started at this time  No overall changes to health since last office visit  Review of Systems  Review of Systems   Constitutional: Negative  HENT: Negative  Respiratory: Negative  Cardiovascular: Negative  Gastrointestinal: Negative  Genitourinary: Positive for frequency and urgency  Negative for decreased urine volume, difficulty urinating, dysuria, flank pain and hematuria  Musculoskeletal: Negative  Skin: Negative  Neurological: Negative  Psychiatric/Behavioral: Negative  Past Medical History  Past Medical History:   Diagnosis Date    Anemia     Anxiety     Depression     Foot drop     Mental disorder     Small bowel problem     Spinal cord injury at C1-C4 level [facility])        Past Social History  Past Surgical History:   Procedure Laterality Date    CYSTOSCOPY      Diagnostic; onset: 1/3/17    FL MYELOGRAM 2 OR MORE REGIONS  9/6/2018    IN COLONOSCOPY FLX DX W/COLLJ SPEC WHEN PFRMD N/A 12/12/2017    Procedure: COLONOSCOPY;  Surgeon: Qing Busby MD;  Location: [facility]; Service: Gastroenterology    SPINAL FUSION         Past Family History  Family History   Problem Relation Age of Onset    Diabetes Father     Lymphoma Father         Waldenstrom's    Heart disease Father         cardiac disorder    Hypertension Father     Pulmonary embolism Father     Hyperlipidemia Father     Stroke Family        Past Social history  Social History     Social History    Marital status: Single     Spouse name: N/A    Number of children: N/A    Years of education: N/A     Occupational History    Not on file       Social History Main Topics    Smoking status: Former Smoker     Packs/day: 1 00     Quit date: 12/12/2016    Smokeless tobacco: Never Used      Comment: current every day smoker, former smoker and smokes less than 1 ppd as per [employer]    Alcohol use Yes      Comment: 2 wkly    Drug use: No      [facility]    Sexual activity: Not on file     Other Topics Concern    Not on file     Social History Narrative    No narrative on file       Current Medications  Current Outpatient Prescriptions   Medication Sig Dispense Refill    busPIRone (BUSPAR) 10 mg tablet Take 1 tablet by mouth 3 (three) times a day      carBAMazepine (TEGretol) 200 mg tablet Take 200 mg by mouth 2 (two) times a day        hydrOXYzine HCL (ATARAX) 25 mg tablet Take 25 mg by mouth every 6 (six) hours as needed for itching      levonorgestrel (MIRENA) 20 MCG/24HR IUD 20 mcg/day      Mirabegron ER (MYRBETRIQ) 50 MG TB24 Take by mouth daily      oxybutynin (DITROPAN-XL) 5 mg 24 hr tablet Take 1 tablet (5 mg total) by mouth daily 30 tablet 11     No current facility-administered medications for this visit  Allergies  Allergies   Allergen Reactions    Sulfamethoxazole Nausea Only and GI Intolerance       Past medical history, social history, family history, medications and allergies were reviewed  Vitals  There were no vitals filed for this visit  Physical Exam  Physical Exam   Constitutional: She is oriented to person, place, and time  She appears well-developed and well-nourished  HENT:   Head: Normocephalic  Eyes: Pupils are equal, round, and reactive to light  Neck: Normal range of motion  Cardiovascular: Normal rate and regular rhythm  Pulmonary/Chest: Effort normal    Abdominal: Soft  Normal appearance  She exhibits no distension  There is no tenderness  There is no CVA tenderness  Musculoskeletal: Normal range of motion  Gait unsteady, ambulates with cane   Neurological: She is alert and oriented to person, place, and time  Skin: Skin is warm and dry  Psychiatric: She has a normal mood and affect   Her behavior is normal  Judgment and thought content normal        Results    I have personally reviewed all pertinent lab results and reviewed with patient  Lab Results   Component Value Date    GLUCOSE 92 12/28/2015    CALCIUM 9 1 03/06/2018     06/21/2017    K 4 0 03/06/2018    CO2 28 03/06/2018     03/06/2018    BUN 12 03/06/2018    CREATININE 0 62 06/21/2017     Lab Results   Component Value Date    WBC 3 9 03/06/2018    HGB 13 4 03/06/2018    HCT 39 3 03/06/2018    MCV 94 2 03/06/2018     09/06/2018     No results found for this or any previous visit (from the past 1 hour(s))

## 2018-11-13 NOTE — PROGRESS NOTES
11/13/2018    Kyra Heller  1985  [id number]    Diagnosis  Chief Complaint     Urinary Urgency; Urinary Frequency         Patient presents for PTNS evaluation of effectiveness    Plan  Per advanced practitioner    Urinary Incontinence Screening      Most Recent Value   Urinary Incontinence   Urinary Incontinence? Yes [1-2 accidents per day, 2-3 pads]   Incomplete emptying? No   Urinary frequency? Yes   Urinary urgency? Yes   Urinary hesitancy? Yes   Dysuria (painful difficult urination)? No   Nocturia (waking up to use the bathroom)? No   Straining (having to push to go)? Yes   Weak stream?  Yes   Intermittent stream?  No   Post void dribbling? No        Patient wishes to discuss with provider before starting treatment today due to lack of efficacy          Patient Goals and Progress  Decreased Urgency No  Decreased Frequency No  Episodes of incontinence No  Sleep Through Night Yes  Related Health and Social Factors Yes    Bladder Diary Summary:  Caffeine cups per day:1 cup  Alcohol- number of drinks per day:rare, social  Daytime voids:10-12 (was 10)  Nighttime voids:occ 1 (was 1-2)  Urgency: generally a 3-4 of a 1-5 scale, no change  Incontinence- episodes per day:1-2 (2-3 pads), no change    AMBROCIO Rivera, BSN

## 2018-11-13 NOTE — PATIENT INSTRUCTIONS
BLADDER HEALTH    WHAT IS CONSIDERED NORMAL?  The average bladder can hold about 2 cups of urine before it needs to be emptied   The normal range of voiding urine is 6 to 8 times during a 24 hour period  As we get older, our bladder capacity can get smaller and we may need to pass urine more frequently but usually not more than every 2 hours   Urine should flow easily without discomfort in a good, steady stream until the bladder is empty  No pushing or straining is necessary to empty the bladder   An urge is a signal that you feel as the bladder stretches to fill with urine  Urges can be felt even if the bladder is not full  Urges are not commands to go to the toilet, merely a signal and can be controlled  WHAT ARE GOOD BLADDER HABITS?  Take your time when emptying your bladder  Dont strain or push to empty your bladder  Make sure you empty your bladder completely each time you pass urine  Do not rush the process   Consistently ignoring the urge to go (waiting more than 4 hours between toileting) or urinating too infrequently may be convenient but not healthy for your bladder   Avoid going to the toilet just in case or more often than every 2 hours  It is usually not necessary to go when you feel the first urge  Try to go only when your bladder is full  Urgency and frequency of urination can be improved by retraining the bladder and spacing your fluid intake throughout the day  Practice good toilet habits  Dont let your bladder control your life  TIPS TO MAINTAIN GOOD BLADDER HABITS   Maintain a good fluid intake  Depending on your body size and environment, drink 6 -8 cups (8 oz each) of fluid per day unless otherwise advised by your doctor  Not enough fluid creates a foul odor and dark color of the urine     Limit the amount of caffeine (coffee, cola, chocolate or tea) and citrus foods that you consume as these foods can be associated with increased sensation of urinary urgency and frequency   Limit the amount of alcohol you drink  Alcohol increases urine production and makes it difficult for the brain to coordinate bladder control   Avoid constipation by maintaining a balanced diet of dietary fiber   Cigarette smoking is also irritating to the bladder surface and is associated with bladder cancer  In addition, the coughing associated with smoking may lead to increased incontinent episodes because of the increased pressure  HOW DIET CAN AFFECT YOUR BLADDER  Although there is no particular "diet" that can cure bladder control, there are certain dietary suggestions you can use to help control the problem  There are 2 points to consider when evaluating how your habits and diet may affect your bladder:    1  Foods and fluids can irritate the bladder  Some foods and beverages are thought to contribute to bladder leakage and irritability  However their effect on the bladder is not completely understood and you may want to see if eliminating one or all of these items improves your bladder control  If you are unable to give them up completely, it is recommended that you use the following items in moderation:   Acidic beverages and foods (orange juice, grapefruit juice, lemonade etc)   Alcoholic beverages   Vinegar   Coffee (regular and decaf)   Tea (regular and decaf)   Caffeinated beverages   Carbonated beverages          2  Drinking enough and the right kinds of fluids  Many people with bladder control issues decrease their intake of liquids in hope that they will need to urinate less frequently or have less urinary leakage   You should not restrict fluids to control your bladder  While a decrease in liquid intake does result in a decrease in the volume of urine, the smaller amount of urine may be more highly concentrated         Highly concentrated, dark yellow urine is irritating to the bladder surface and may actually cause you to go to the bathroom more frequently   It also encourages the growth of bacteria, which may lead to infections resulting in incontinence   Substitutions for Bladder Irritants: water is always the best beverage choice    Grape and apple juice are thirst quenchers are good selections and are not as irritating to the bladder   o Low acid fruits:  Pears, apricots, papaya, watermelon  o For coffee drinkers: KAVA®, Postum®, Elizabeth®, Kaffree Niecy®  o For tea drinkers:  non-citrus or herbal and sun brewed tea

## 2019-01-10 ENCOUNTER — CLINICAL SUPPORT (OUTPATIENT)
Dept: PAIN MEDICINE | Facility: CLINIC | Age: 34
End: 2019-01-10
Payer: COMMERCIAL

## 2019-01-10 VITALS
SYSTOLIC BLOOD PRESSURE: 125 MMHG | WEIGHT: 126 LBS | BODY MASS INDEX: 21.51 KG/M2 | HEART RATE: 94 BPM | HEIGHT: 64 IN | DIASTOLIC BLOOD PRESSURE: 83 MMHG | TEMPERATURE: 98.3 F

## 2019-01-10 DIAGNOSIS — Z87.828 H/O SPINAL CORD INJURY: ICD-10-CM

## 2019-01-10 DIAGNOSIS — M54.16 LUMBAR RADICULOPATHY: ICD-10-CM

## 2019-01-10 DIAGNOSIS — M79.18 MYOFASCIAL PAIN: Primary | ICD-10-CM

## 2019-01-10 DIAGNOSIS — M54.6 CHRONIC MIDLINE THORACIC BACK PAIN: ICD-10-CM

## 2019-01-10 DIAGNOSIS — G89.29 CHRONIC MIDLINE THORACIC BACK PAIN: ICD-10-CM

## 2019-01-10 DIAGNOSIS — S34.101S: ICD-10-CM

## 2019-01-10 PROCEDURE — 99214 OFFICE O/P EST MOD 30 MIN: CPT | Performed by: ANESTHESIOLOGY

## 2019-01-10 RX ORDER — TIZANIDINE 2 MG/1
2 TABLET ORAL EVERY 8 HOURS PRN
Qty: 90 TABLET | Refills: 2 | Status: SHIPPED | OUTPATIENT
Start: 2019-01-10 | End: 2019-06-20

## 2019-01-10 NOTE — PROGRESS NOTES
Assessment:  1  Myofascial pain    2  Chronic midline thoracic back pain    3  Injury to L1 level of spinal cord, sequela ([address] )    4  H/O spinal cord injury    5  Lumbar radiculopathy        Plan:  53-year-old female with a history of partial L1 spinal cord injury with subsequent T12-L2 fusion and L1 corpectomy returning for follow-up of chronic axial thoracic back pain centered between her scapula  She does have chronic foot drop bilaterally and neurogenic bladder which is essentially baseline  Her major complaint is her thoracic back pain which seems to be mostly myofascial in nature  The patient did have a recent MRI of her thoracic spine showing some anterior displacement of the conus secondary to arachnoid cyst or scar tissue at the level of L1 which is unchanged since her CT myelogram   The patient was also noted on CT myelogram that there was a T12 screw fracture  The patient was evaluated by Dr Hi Brice who did not feel that surgical intervention was warranted  The patient did have trigger point injections which did not provide her with any relief  She has taken NSAIDs, methocarbamol, cyclobenzaprine, and oxycodone in the past all without any significant relief  She has done physical therapy and although she did feel some relief with this, her improvement has plateaued  1  I will prescribe a trial of aquatic therapy for the patient's myofascial pain/thoracic back pain  2  I will discontinue his cyclobenzaprine and trial tizanidine 2 mg q 8 hours p r n   3  I will follow up the patient in 3 months    My impressions and treatment recommendations were discussed in detail with the patient who verbalized understanding and had no further questions  Discharge instructions were provided  I personally saw and examined the patient and I agree with the above discussed plan of care  No orders of the defined types were placed in this encounter      No orders of the defined types were placed in this encounter  History of Present Illness:    Omar Miranda is a 34 y o  female with a history of partial L1 spinal cord injury with subsequent T12-L2 fusion and L1 corpectomy returning for follow-up of chronic axial thoracic back pain centered between her scapula  She does have chronic foot drop bilaterally and neurogenic bladder which is essentially baseline  Her major complaint is her thoracic back pain  She denies any radiation into the thorax or abdomen  She denies any fecal incontinence  The patient did have a recent MRI of her thoracic spine showing some anterior displacement of the conus secondary to arachnoid cyst or scar tissue at the level of L1 which is unchanged since her CT myelogram   The patient was also noted on CT myelogram that there was a T12 screw fracture  The patient was evaluated by Dr Billie Rene who did not feel that surgical intervention was warranted  The patient did have trigger point injections which did not provide her with any relief  She has taken NSAIDs, methocarbamol, cyclobenzaprine, and oxycodone in the past all without any significant relief  She has done physical therapy and although she did feel some relief with this, her improvement has plateaued  The patient rates her pain a 5/10 on the pain is worse in the evening  The pain is constant and described as burning, sharp, cramping, and pressure like  The pain is worse with exercise  The pain is alleviated with relaxation  I have personally reviewed and/or updated the patient's past medical history, past surgical history, family history, social history, current medications, allergies, and vital signs today  Other than as stated above, the patient denies any interval changes in medications, medical condition, mental condition, symptoms, or allergies since the last office visit  Review of Systems:    Review of Systems   Constitutional: Negative for fever and unexpected weight change     HENT: Negative for trouble swallowing  Eyes: Negative for visual disturbance  Respiratory: Negative for shortness of breath and wheezing  Cardiovascular: Negative for chest pain and palpitations  Gastrointestinal: Negative for constipation, diarrhea, nausea and vomiting  Endocrine: Negative for cold intolerance, heat intolerance and polydipsia  Genitourinary: Negative for difficulty urinating and frequency  Musculoskeletal: Positive for gait problem and joint swelling  Negative for arthralgias and myalgias  Decreased ROM   Skin: Negative for rash  Neurological: Positive for weakness  Negative for dizziness, seizures, syncope and headaches  Hematological: Does not bruise/bleed easily  Psychiatric/Behavioral: Negative for dysphoric mood  All other systems reviewed and are negative  Patient Active Problem List   Diagnosis    Paronychia of toe of right foot    Constipation    Weight loss    H/O spinal cord injury    Chronic thoracic back pain    Low vitamin B12 level    Vegetarian diet    Injury to L1 level of spinal cord (HCC)    Bipolar affective disorder (HCC)    Lumbar radiculopathy    Myofascial pain    Left flank pain    Abnormal urinalysis    Urinary urgency       Past Medical History:   Diagnosis Date    Anemia     Anxiety     Depression     Foot drop     Mental disorder     Small bowel problem     Spinal cord injury at C1-C4 level [facility])        Past Surgical History:   Procedure Laterality Date    CYSTOSCOPY      Diagnostic; onset: 1/3/17    FL MYELOGRAM 2 OR MORE REGIONS  9/6/2018    NM COLONOSCOPY FLX DX W/COLLJ SPEC WHEN PFRMD N/A 12/12/2017    Procedure: COLONOSCOPY;  Surgeon: Jaelyn Fleming MD;  [facility];   Service: Gastroenterology    SPINAL FUSION         Family History   Problem Relation Age of Onset    Diabetes Father     Lymphoma Father         Waldenstrom's    Heart disease Father         cardiac disorder    Hypertension Father    Ardeth Needs Pulmonary embolism Father     Hyperlipidemia Father     Stroke Family        Social History     Occupational History    Not on file  Social History Main Topics    Smoking status: Former Smoker     Packs/day: 1 00     Quit date: 12/12/2016    Smokeless tobacco: Never Used      Comment: current every day smoker, former smoker and smokes less than 1 ppd as per [employer]    Alcohol use Yes      Comment: 2 wkly    Drug use: No      Comment: Drug use as per [employer]    Sexual activity: Not on file       Current Outpatient Prescriptions on File Prior to Visit   Medication Sig    busPIRone (BUSPAR) 10 mg tablet Take 1 tablet by mouth 3 (three) times a day    carBAMazepine (TEGretol) 200 mg tablet Take 200 mg by mouth 2 (two) times a day      hydrOXYzine HCL (ATARAX) 25 mg tablet Take 25 mg by mouth every 6 (six) hours as needed for itching    levonorgestrel (MIRENA) 20 MCG/24HR IUD 20 mcg/day    Mirabegron ER (MYRBETRIQ) 50 MG TB24 Take by mouth daily    oxybutynin (DITROPAN-XL) 5 mg 24 hr tablet Take 1 tablet (5 mg total) by mouth daily    sertraline (ZOLOFT) 50 mg tablet Take 50 mg by mouth every morning     No current facility-administered medications on file prior to visit  Allergies   Allergen Reactions    Sulfamethoxazole Nausea Only and GI Intolerance       Physical Exam:    Ht 5' 4" (1 626 m)   Wt 57 2 kg (126 lb)   BMI 21 63 kg/m²     Constitutional: normal, well developed, well nourished, alert, in no distress and non-toxic and no overt pain behavior  Eyes: anicteric  HEENT: grossly intact  Neck: supple, symmetric, trachea midline and no masses   Pulmonary:even and unlabored  Cardiovascular:No edema or pitting edema present  Skin:Normal without rashes or lesions and well hydrated  Psychiatric:Mood and affect appropriate  Neurologic:Cranial Nerves II-XII grossly intact  Musculoskeletal:ambulates with cane    Bilateral thoracic paraspinal musculature and rhomboids tender to palpation and ropy in texture  He bilateral lumbar paraspinals mildly tender to palpation from L1-L3  Bilateral dorsiflexion and EHL 3/5 strength  Bilateral inversion, eversion, and plantar flexion 1/5 strength  Bilateral knee extension and flexion 5/5 strength  Bilateral hip flexion, extension, abduction, and abduction was 5/5 strength  Negative straight leg raise bilaterally  Imaging            PACS Images     Show images for MRI thoracic spine wo contrast   Order Report      Order Details   Order Questions     Question Answer Comment   Pregnant? Unknown    What is the patient's sedation requirement? No Sedation           Reason For Exam     Mid-back/thoracic spine pain, first study; Hx spinal cord injury with hardware T12-L2   Dx: Injury to L1 level of spinal cord, subsequent encounter ([facility] ) [S34 101D (ICD-10-CM)]; H/O spinal cord injury [Z87 828 (ICD-10-CM)]   Study Result     MRI THORACIC SPINE WITHOUT CONTRAST     INDICATION: S34 101D: Unspecified injury to L1 level of lumbar spinal cord, subsequent encounter  Z87 [id number]: Personal history of other (healed) physical injury and trauma      COMPARISON:  9/6/2018     TECHNIQUE:  Sagittal T1, sagittal T2, sagittal inversion recovery, axial T2,  axial 2D MERGE      IMAGE QUALITY: Diagnostic      FINDINGS:     ALIGNMENT: Normal alignment of the thoracic spine  No compression fracture  No subluxation  No scoliosis      MARROW SIGNAL:  Spinal construct at the thoracolumbar junction redemonstrated with associated artifact  Screws in the T12 vertebra corpectomy of L1 and screws in the L2 vertebra are better characterized on the prior thoracic myelogram      THORACIC CORD: The caudal aspect of the spinal cord is displaced anteriorly in the spinal canal at the L1 segment, displaced by a CSF signal intensity structure, possibly an arachnoid cyst or scar tissue, seen on image 49, series 7  The conus medullaris   appears to terminate at the superior endplate of L1    There is a small amount of signal abnormality in the right side of the spinal cord/conus medullaris at the superior endplate of L1, image 48, series 51, suspicious for a small amount of myelomalacia      PARAVERTEBRAL SOFT TISSUES:  Normal      THORACIC DEGENERATIVE CHANGE: No disc herniation, canal stenosis or foraminal narrowing  No degenerative changes      IMPRESSION:        1  Spinal construct at the thoracolumbar junction is better characterized on the prior thoracic myelogram  2  Anterior displacement of the conus medullaris possibly secondary to an arachnoid cyst and/or scar tissue, similar to the prior myelogram   3   Small amount of signal abnormality in the right side of the conus medullaris, suspicious for myelomalacia, immediately adjacent to the spinal construct    4  There is no focal disk herniation, central canal or neural foraminal narrowing            Workstation performed: [id number]

## 2019-01-21 ENCOUNTER — EVALUATION (OUTPATIENT)
Dept: PHYSICAL THERAPY | Age: 34
End: 2019-01-21
Payer: COMMERCIAL

## 2019-01-21 DIAGNOSIS — M54.16 LUMBAR RADICULOPATHY: ICD-10-CM

## 2019-01-21 DIAGNOSIS — M54.6 CHRONIC MIDLINE THORACIC BACK PAIN: Primary | ICD-10-CM

## 2019-01-21 DIAGNOSIS — S34.101S: ICD-10-CM

## 2019-01-21 DIAGNOSIS — G89.29 CHRONIC MIDLINE THORACIC BACK PAIN: Primary | ICD-10-CM

## 2019-01-21 PROCEDURE — 97162 PT EVAL MOD COMPLEX 30 MIN: CPT | Performed by: PHYSICAL THERAPIST

## 2019-01-21 PROCEDURE — G8979 MOBILITY GOAL STATUS: HCPCS | Performed by: PHYSICAL THERAPIST

## 2019-01-21 PROCEDURE — G8978 MOBILITY CURRENT STATUS: HCPCS | Performed by: PHYSICAL THERAPIST

## 2019-01-21 NOTE — PROGRESS NOTES
PT Evaluation     Today's date: 2019  Patient name: Joey Olszewski  : 1985  MRN: [id number]  Referring provider: Anne-Marie Cerna DO  Dx:   Encounter Diagnosis     ICD-10-CM    1  Chronic midline thoracic back pain M54 6     G89 29    2  Lumbar radiculopathy M54 16    3  Injury to L1 level of spinal cord, [facility] ([facility] ) S34 101S                   Assessment  Assessment details: Patient reported in  she injured her lumbar spine region when she fell out of a tree  Patient noted she had T 12 - L 2 fusion soon after fall  Patient noted she initially lost bilateral le motor but after one month she regained her bilateral le strength except for bilateral ankle DF and PF  Patient noted she still has bilateral hip weakness in abduction and extension  Patient noted she uses spc and bilateral AFO for gait activities  Patient noted then two years ago she started with middle back region / thoracic spine region pain  Patient noted prolonged sitting with work activities produced thoracic spine pain  Patient noted she has been unsuccessful in thoracic spine pain reduction since  Patient reported thoracic spine pain limits grocery shopping, cooking, prolonged standing activities, prolonged sitting activities, driving with hand controls and sleep is deprived if iadls aggravate thoracic spine region pain as functional activities limited by pain aggravation  Patient noted heat is effective in thoracic spine pain reduction as well as yoga as stretching activities into bilateral shoulder and spinal extension movements as well as cat / camel stretching  Patient reported she tried land based PT which was aggravating especially lumbar spine rotation  Patient denies surgeon or doctor specific issues or restrictions  Patient noted she has bilateral feet and gastrocnemius numbness that persists since onset of fall and T 12- L 2 fusion    Patient reported pain aggravation is typical to the thoracic spine inferior to bilateral scapular region  Patient noted her thoracic spine symptoms limit her bilateral ue functional mobility  Impairments: abnormal or restricted ROM, abnormal movement, impaired physical strength, pain with function and safety issue  Understanding of Dx/Px/POC: excellent   Prognosis: good  Prognosis details: Patient is a 34y o  year old female seen for outpatient PT evaluation with pain and mobility deficits due to thoracic spine region pain  Patient presents to PT IE with the following problems, concerns, deficits and impairments: thoracic spine region pain, decreased lumbar spine range of motion, decreased bilateral ue and le ROM and strength, + TTP, decrease in postural awareness, gait and stair dysfunctions, balance deficits, functional limitations and decreased tolerance to activity  Patient would benefit from skilled PT services under the following PT treatment plan to address the above noted deficits: Pool and Land based therapeutic exercises and activities to facilitate bilateral lumbar and cervical spine as well as bilateral UE and LE rom and strength, postural reeducation and strengthening, DLS and abdominal bracing, gait and stair training, balance and proprioception activities, modalities, manual therapy techniques, IASTM techniques, Kinesio taping techniques and a hep  Thank you for the referral      Goals  Short Term goals - 4 weeks  1  Patient will be independent HEP  2   Patient will report a 25 - 50% decrease in pain complaints  3   Increase strength 1/2 grade  4   Increase ROM 5-10 degrees  Long Term goals - 8 weeks  1  Patient will report elimination of pain complaints  2   Patient will return to all work related activities without restriction  3   Patient will return to all recreational activities without restriction  4   ROM WFL  5   Strength 5/5   6   Patient will report grocery shopping and cooking activities improved > 25 %    7   Patient will report prolonged standing activities improved > 25%  8   Patient will report prolonged sitting activities improved > 25%  9   Patient will report driving improved > 25 %  10   Patient will report sleep improved > 25 %  11   Patient will report self foto computerized functional index improved > = 52  Plan  Patient would benefit from: skilled physical therapy  Planned modality interventions: cryotherapy, TENS, thermotherapy: hydrocollator packs and unattended electrical stimulation  Planned therapy interventions: manual therapy, activity modification, ADL retraining, ADL training, aquatic therapy, neuromuscular re-education, patient education, self care, compression, strengthening, stretching, therapeutic activities, therapeutic exercise, therapeutic training, transfer training, flexibility, functional ROM exercises, gait training, graded activity, graded exercise, graded motor, home exercise program and balance  Frequency: 2x week  Duration in weeks: 8        Subjective Evaluation    History of Present Illness  Mechanism of injury: PMHx is remarkable for SCI at L 1 with T12-L2 fusion and L1 corpectomy , Bipolar disorder, depression, anxiety, anemia, bilateral foot drop, FALL RISK, and neurogenic bladder  MRI THORACIC SPINE WITHOUT CONTRAST     INDICATION: S34 101D: Unspecified injury to L1 level of lumbar spinal cord, subsequent encounter  Z87 [id number]: Personal history of other (healed) physical injury and trauma      COMPARISON:  9/6/2018     TECHNIQUE:  Sagittal T1, sagittal T2, sagittal inversion recovery, axial T2,  axial 2D MERGE      IMAGE QUALITY: Diagnostic      FINDINGS:     ALIGNMENT: Normal alignment of the thoracic spine  No compression fracture  No subluxation  No scoliosis      MARROW SIGNAL:  Spinal construct at the thoracolumbar junction redemonstrated with associated artifact    Screws in the T12 vertebra corpectomy of L1 and screws in the L2 vertebra are better characterized on the prior thoracic myelogram      THORACIC CORD: The caudal aspect of the spinal cord is displaced anteriorly in the spinal canal at the L1 segment, displaced by a CSF signal intensity structure, possibly an arachnoid cyst or scar tissue, seen on image 49, series 7  The conus medullaris   appears to terminate at the superior endplate of L1  There is a small amount of signal abnormality in the right side of the spinal cord/conus medullaris at the superior endplate of L1, image 48, series 51, suspicious for a small amount of myelomalacia      PARAVERTEBRAL SOFT TISSUES:  Normal      THORACIC DEGENERATIVE CHANGE: No disc herniation, canal stenosis or foraminal narrowing  No degenerative changes      IMPRESSION:        1  Spinal construct at the thoracolumbar junction is better characterized on the prior thoracic myelogram  2  Anterior displacement of the conus medullaris possibly secondary to an arachnoid cyst and/or scar tissue, similar to the prior myelogram   3   Small amount of signal abnormality in the right side of the conus medullaris, suspicious for myelomalacia, immediately adjacent to the spinal construct  4  There is no focal disk herniation, central canal or neural foraminal narrowing  Quality of life: good    Pain  At best pain rating: 3  At worst pain ratin  Location: thoracic spine region pain    Patient Goals  Patient goals for therapy: decreased pain, increased motion, increased strength and independence with ADLs/IADLs  Patient goal: Patient's goal:" to reduce pain and move better"  Objective     Tenderness     Additional Tenderness Details  Patient is + minimal TTP at thoracic spine region pain and she exhibits decrease in thoracic spine kyphosis      Active Range of Motion   Cervical/Thoracic Spine   Cervical    Flexion: 45 degrees   Extension: 42 degrees   Left lateral flexion: 24 degrees   Right lateral flexion: 30 degrees   Left rotation: 72 degrees   Right rotation: 68 degrees   Left Shoulder Flexion: 158 degrees   Abduction: 172 degrees   External rotation 90°: 90 degrees   Internal rotation 90°: 70 degrees     Right Shoulder   Flexion: 164 degrees   Abduction: 170 degrees   External rotation 90°: 100 degrees  Internal rotation 90°: 72 degrees     Lumbar   Flexion: 105 degrees   Extension: 14 degrees   Left lateral flexion: 18 degrees   Right lateral flexion: 20 degrees   Left rotation: 35 degrees   Right rotation: 40 degrees   Left Hip   Flexion: 132 degrees   Abduction: 30 degrees     Right Hip   Flexion: 132 degrees   Abduction: 28 degrees   Left Knee   Flexion: 142 degrees   Extension: -4 degrees   Extensor la degrees     Right Knee   Flexion: 142 degrees   Extension: -5 degrees   Extensor la degrees     Additional Active Range of Motion Details  slr on right at 70 and left at 80 degrees  Strength/Myotome Testing     Left Shoulder     Planes of Motion   Flexion: 4+   Abduction: 4+   External rotation at 0°: 4+   Internal rotation at 0°: 5     Right Shoulder     Planes of Motion   Flexion: 4+   Abduction: 4+   External rotation at 0°: 4+   Internal rotation at 0°: 5     Left Elbow   Flexion: 5  Extension: 5    Right Elbow   Flexion: 5  Extension: 5    Left Hip   Planes of Motion   Flexion: 4+  Extension: 3+  Abduction: 3+  Adduction: 4+    Right Hip   Planes of Motion   Flexion: 4+  Extension: 3+  Abduction: 3+  Adduction: 4+    Left Knee   Flexion: 4+  Extension: 4+    Right Knee   Flexion: 4+  Extension: 4+    Left Ankle/Foot   Dorsiflexion: 2-  Plantar flexion: 0    Right Ankle/Foot   Dorsiflexion: 1  Plantar flexion: 0    Ambulation     Ambulation: Level Surfaces     Additional Level Surfaces Ambulation Details  Patient ambulates with spc with decrease in pace, bilateral hip in ER, bilateral ankle AFO, decrease in bilateral step length and increase in base of support      Ambulation: Stairs   Ascend stairs: independent  Pattern: non-reciprocal  Railings: two rails  Descend stairs: independent  Pattern: non-reciprocal  Railings: two rails      Flowsheet Rows      Most Recent Value   PT/OT G-Codes   Current Score  41   Projected Score  52   FOTO information reviewed  Yes   Assessment Type  Evaluation   G code set  Mobility: Walking & Moving Around   Mobility: Walking and Moving Around Current Status ()  CK   Mobility: Walking and Moving Around Goal Status ()  CK          Precautions: PMHx is remarkable for SCI at L 1 with T12-L2 fusion and L1 corpectomy , Bipolar disorder, depression, anxiety, anemia, bilateral foot drop, FALL RISK, and neurogenic bladder  Fall Risk  Daily Treatment Diary     Manual  1/21                                                                               May need help out of pool!     Exercise Diary  1/21            Water walking: pre and post             Seated gastrocnemius stretch:B:             Seated Hamstring stretch:B:             LAQ:B:             Hip flexion:B:             Ankle AROM: all planes:B:             DLS abdominal bracing             Postural correction slough over correct             Seated Shoulder horizontal adduction stretch:B:             Mid back stretch seated             UE support or standing against wall or bench during standing exercises             SLR x 3:B:             Hamstring curls:B:             Mini squats             Lunges:B:             Step ups:B:             Step downs:B:             SLS and Tandem stance:B:             Side stepping and tandem ambulation             Paddle rows, horizontal adduction and shoulder abduction:B:             Scapular squeeze             Shoulder scaption and flexion             Wall push ups:B:                                       Pool pony hang                              Modalities

## 2019-01-23 ENCOUNTER — OFFICE VISIT (OUTPATIENT)
Dept: PHYSICAL THERAPY | Age: 34
End: 2019-01-23
Payer: COMMERCIAL

## 2019-01-23 DIAGNOSIS — M54.16 LUMBAR RADICULOPATHY: Primary | ICD-10-CM

## 2019-01-23 PROCEDURE — 97113 AQUATIC THERAPY/EXERCISES: CPT

## 2019-01-23 NOTE — PROGRESS NOTES
Daily Note     Today's date: 2019  Patient name: Asher Sorto  : 1985  MRN: [id number]  Referring provider: Michael Noonan DO  Dx:   Encounter Diagnosis     ICD-10-CM    1  Lumbar radiculopathy M54 16                   Subjective: Pt  Tolerated first pool session very well  Progress as tolerated  Objective: See treatment diary below      Assessment: Tolerated treatment well  Patient would benefit from continued PT      Plan: Continue per plan of care  Precautions: PMHx is remarkable for SCI at L 1 with T12-L2 fusion and L1 corpectomy , Bipolar disorder, depression, anxiety, anemia, bilateral foot drop, FALL RISK, and neurogenic bladder  Fall Risk  Daily Treatment Diary     Manual                                                                                 May need help out of pool!     Exercise Diary             Water walking: pre and post  5/5           Seated gastrocnemius stretch:B:  20sec x 5           Seated Hamstring stretch:B:  20sec x 5           LAQ:B:  20           Hip flexion:B:  20           Ankle AROM: all planes:B:  20           DLS abdominal bracing             Postural correction slough over correct  3 sec x 10           Seated Shoulder horizontal adduction stretch:B:             Mid back stretch seated             UE support or standing against wall or bench during standing exercises             SLR x 3:B:  20           Hamstring curls:B:  20           Mini squats             Lunges:B:             Step ups:B:             Step downs:B:             SLS and Tandem stance:B:             Side stepping and tandem ambulation  5           Paddle rows, horizontal adduction and shoulder abduction:B:             Scapular squeeze             Shoulder scaption and flexion             Wall push ups:B:                                       Pool pony hang                              Modalities

## 2019-01-28 ENCOUNTER — OFFICE VISIT (OUTPATIENT)
Dept: PHYSICAL THERAPY | Age: 34
End: 2019-01-28
Payer: COMMERCIAL

## 2019-01-28 DIAGNOSIS — S34.101S: ICD-10-CM

## 2019-01-28 DIAGNOSIS — M54.16 LUMBAR RADICULOPATHY: ICD-10-CM

## 2019-01-28 DIAGNOSIS — G89.29 CHRONIC MIDLINE THORACIC BACK PAIN: Primary | ICD-10-CM

## 2019-01-28 DIAGNOSIS — M54.6 CHRONIC MIDLINE THORACIC BACK PAIN: Primary | ICD-10-CM

## 2019-01-28 PROCEDURE — 97113 AQUATIC THERAPY/EXERCISES: CPT | Performed by: SPECIALIST/TECHNOLOGIST

## 2019-01-28 NOTE — PROGRESS NOTES
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     Exercise Diary            Water walking: pre and post  5/5 5/5          Seated gastrocnemius stretch:B:  20sec x 5 20sec x5          Seated Hamstring stretch:B:  20sec x 5 20sec x 5          LAQ:B:  20 30x          Hip flexion:B:  20 30x          Ankle AROM: all planes:B:  20 20x ea          DLS abdominal bracing   20x :05          Postural correction slough over correct  3 sec x 10 3 sec x20          Seated Shoulder horizontal adduction stretch:B:   3x :30          Mid back stretch seated             UE support or standing against wall or bench during standing exercises             SLR x 3:B:  20 20x          Hamstring curls:B:  20 NT          Mini squats   20x          Lunges:B:             Step ups:B:             Step downs:B:             SLS and Tandem stance:B:             Side stepping and tandem ambulation  5 5x each          Paddle rows, horizontal adduction and shoulder abduction:B:   15x each          Scapular squeeze   20x :05          Shoulder scaption and flexion   15x           Wall push ups:B:                                       Pool pony hang Modalities                                                       Assessment: Additional UE exercises from POC added this visit- pt demonstrates appropriate muscle activation after visual demonstration and verbal cues  Tolerated treatment well  Pt demonstrates increased exercise capacity while in pool  Patient would benefit from continued PT  Plan: Continue per plan of care  Progress treatment as tolerated

## 2019-02-01 ENCOUNTER — OFFICE VISIT (OUTPATIENT)
Dept: PHYSICAL THERAPY | Age: 34
End: 2019-02-01
Payer: COMMERCIAL

## 2019-02-01 ENCOUNTER — APPOINTMENT (OUTPATIENT)
Dept: PHYSICAL THERAPY | Age: 34
End: 2019-02-01
Payer: COMMERCIAL

## 2019-02-01 DIAGNOSIS — M54.16 LUMBAR RADICULOPATHY: Primary | ICD-10-CM

## 2019-02-01 NOTE — PROGRESS NOTES
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     Exercise Diary           Water walking: pre and post   5/          Seated gastrocnemius stretch:B:  20sec x 5 20sec x5          Seated Hamstring stretch:B:  20sec x 5 20sec x 5          LAQ:B:  20 30x          Hip flexion:B:  20 30x          Ankle AROM: all planes:B:  20 20x ea          DLS abdominal bracing   20x :05          Postural correction slough over correct  3 sec x 10 3 sec x20          Seated Shoulder horizontal adduction stretch:B:   3x :30          Mid back stretch seated             UE support or standing against wall or bench during standing exercises             SLR x 3:B:  20 20x          Hamstring curls:B:  20 NT          Mini squats   20x          Lunges:B:             Step ups:B:             Step downs:B:             SLS and Tandem stance:B:             Side stepping and tandem ambulation  5 5x each          Paddle rows, horizontal adduction and shoulder abduction:B:   15x each          Scapular squeeze   20x :05          Shoulder scaption and flexion   15x           Wall push ups:B:                                       Pool pony hang                              Modalities                                                       Assessment: Additional UE exercises from POC added this visit- pt demonstrates appropriate muscle activation after visual demonstration and verbal cues  Tolerated treatment well  Pt demonstrates increased exercise capacity while in pool  Patient would benefit from continued PT  Plan: Continue per plan of care  Progress treatment as tolerated

## 2019-02-04 ENCOUNTER — OFFICE VISIT (OUTPATIENT)
Dept: PHYSICAL THERAPY | Age: 34
End: 2019-02-04
Payer: COMMERCIAL

## 2019-02-04 DIAGNOSIS — G89.29 CHRONIC MIDLINE THORACIC BACK PAIN: Primary | ICD-10-CM

## 2019-02-04 DIAGNOSIS — M54.6 CHRONIC MIDLINE THORACIC BACK PAIN: Primary | ICD-10-CM

## 2019-02-04 DIAGNOSIS — S34.101S: ICD-10-CM

## 2019-02-04 DIAGNOSIS — M54.16 LUMBAR RADICULOPATHY: ICD-10-CM

## 2019-02-04 PROCEDURE — 97113 AQUATIC THERAPY/EXERCISES: CPT | Performed by: SPECIALIST/TECHNOLOGIST

## 2019-02-04 NOTE — PROGRESS NOTES
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     Exercise Diary   2/4         Water walking: pre and post  5/5 5/5 5/5         Seated gastrocnemius stretch:B:  20sec x 5 20sec x5 20sec x 5         Seated Hamstring stretch:B:  20sec x 5 20sec x 5 20sec x 5         LAQ:B:  20 30x 30x         Hip flexion:B:  20 30x 30x         Ankle AROM: all planes:B:  20 20x ea 20x ea         DLS abdominal bracing   20x :05 20x :05         Postural correction slough over correct  3 sec x 10 3 sec x20 3 sec x 20         Seated Shoulder horizontal adduction stretch:B:   3x :30 3x :30         Mid back stretch seated             UE support or standing against wall or bench during standing exercises             SLR x 3:B:  20 20x 20x         Hamstring curls:B:  20 NT 20x         Mini squats   20x 20x         Lunges:B:             Step ups:B:    15x         Step downs:B:             SLS and Tandem stance:B:             Side stepping and tandem ambulation  5 5x each 5x each         Paddle rows, horizontal adduction and shoulder abduction:B:   15x each 15x each         Scapular squeeze   20x :05 30x :05         Shoulder scaption and flexion   15x  15x          Wall push ups:B:    20x                                   Pool pony hang                              Modalities                                                       Assessment: Pt able to tolerate increasingly dynamic aquatic exercises this visit with [address] [place] [address] for balance  Pt continues to demonstrate improved exercise capacity in pool  Tolerated treatment well  Patient demonstrated fatigue post treatment, exhibited good technique with therapeutic exercises and would benefit from continued PT in pool  Plan: Continue per plan of care  Progress treatment as tolerated

## 2019-02-05 ENCOUNTER — TELEPHONE (OUTPATIENT)
Dept: PAIN MEDICINE | Facility: CLINIC | Age: 34
End: 2019-02-05

## 2019-02-05 NOTE — TELEPHONE ENCOUNTER
Received as an email  She has an office visit on 4/4/19  Could all of this be discussed then? Please advise   thanks

## 2019-02-05 NOTE — TELEPHONE ENCOUNTER
----- Message from Greg Loaiza sent at 2/5/2019 12:00 PM EST -----  Regarding: Non-Urgent Medical Question  Contact: [phone]  HiVimal been having increased pain lately along my spine and was doing some research  Im wondering if ligament laxity could contribute to my troubles and if theres anything to do about that? Also was reading about thoracic hypokyphosis which is clearly an issue on my xrays and has been vaguely mentioned to me before but never really discussed  Is this something that could be corrected and could it be the cause of my crushing feelings? Also have been having some shortness of breath and Im not sure if its anxiety or crushing because I do have stressful events in my life currently  But Im aware that I have diminished lung capacity which could be related to the hypokyphosis? This all seems to stem from the instability/inflexibility of my lower spine due to the surgery so I figure there is only so much that can be done without the ability to change the curve of the lumbar  I would like to mention that Im still working on attaining disability ssi and any diagnosis in my charts that helps  to illustrate the complexity of my condition would be helpful   Thanks, [employer] [facility]

## 2019-02-08 ENCOUNTER — OFFICE VISIT (OUTPATIENT)
Dept: PHYSICAL THERAPY | Age: 34
End: 2019-02-08
Payer: COMMERCIAL

## 2019-02-08 DIAGNOSIS — M54.6 CHRONIC MIDLINE THORACIC BACK PAIN: ICD-10-CM

## 2019-02-08 DIAGNOSIS — G89.29 CHRONIC MIDLINE THORACIC BACK PAIN: ICD-10-CM

## 2019-02-08 PROCEDURE — 97113 AQUATIC THERAPY/EXERCISES: CPT

## 2019-02-08 NOTE — PROGRESS NOTES
Daily Note     Today's date: 2019  Patient name: Chasity Jerry  : 1985  MRN: [id number]  Referring provider: Jefry Mancia DO  Dx:   No diagnosis found  Subjective: Patient states some back pain today, 4/10  Objective: See treatment diary below    Precautions: PMHx is remarkable for 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     Exercise Diary          Water walking: pre and post   5/5 5/ 5/        Seated gastrocnemius stretch:B:  20sec x 5 20sec x5 20sec x 5 20 secx5        Seated Hamstring stretch:B:  20sec x 5 20sec x 5 20sec x 5 [id number]        LAQ:B:  20 30x 30x 30x        Hip flexion:B:  20 30x 30x 30x        Ankle AROM: all planes:B:  20 20x ea 20x ea 20x ea        DLS abdominal bracing   20x :05 20x :05 20x 5sec        Postural correction slough over correct  3 sec x 10 3 sec x20 3 sec x 20 3 sec x20        Seated Shoulder horizontal adduction stretch:B:   3x :30 3x :30 3x 30 sec        Mid back stretch seated             UE support or standing against wall or bench during standing exercises             SLR x 3:B:  20 20x 20x 20x        Hamstring curls:B:  20 NT 20x 20x        Mini squats   20x 20x 20x        Lunges:B:             Step ups:B:    15x 20x        Step downs:B:             SLS and Tandem stance:B:             Side stepping and tandem ambulation  5 5x each 5x each 5x ea        Paddle rows, horizontal adduction and shoulder abduction:B:   15x each 15x each 20x ea        Scapular squeeze   20x :05 30x :05 30x 5sec        Shoulder scaption and flexion   15x  15x  20x        Wall push ups:B:    20x NT                                  Pool pony hang Modalities                                                       Assessment: Patient showed muscle fatigue at end of session  Monitor pain as pain was a little worse after session  Tolerated treatment fair  Patient would benefit from continued PT in pool  Plan: Continue per plan of care  Progress treatment as tolerated

## 2019-02-11 ENCOUNTER — OFFICE VISIT (OUTPATIENT)
Dept: PHYSICAL THERAPY | Age: 34
End: 2019-02-11
Payer: COMMERCIAL

## 2019-02-11 DIAGNOSIS — G89.29 CHRONIC MIDLINE THORACIC BACK PAIN: Primary | ICD-10-CM

## 2019-02-11 DIAGNOSIS — M54.6 CHRONIC MIDLINE THORACIC BACK PAIN: Primary | ICD-10-CM

## 2019-02-11 PROCEDURE — 97113 AQUATIC THERAPY/EXERCISES: CPT

## 2019-02-11 NOTE — TELEPHONE ENCOUNTER
Thoracic hypokyphosis may be partially corrected with PT and working on posture  Regarding the pt's SOB and anxiety, she should be evaluated by her PCP to ensure there is no cardiopulmonary etiology

## 2019-02-11 NOTE — PROGRESS NOTES
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     Exercise Diary         Water walking: pre and post  5/ 5/5 5/5 5/5 5/5       Seated gastrocnemius stretch:B:  20sec x 5 20sec x5 20sec x 5 20 secx5 20sec x 5       Seated Hamstring stretch:B:  20sec x 5 20sec x 5 20sec x 5 [id number] 20sec x 5       LAQ:B:  20 30x 30x 30x 30       Hip flexion:B:  20 30x 30x 30x 30       Ankle AROM: all planes:B:  20 20x ea 20x ea 20x ea 20       DLS abdominal bracing   20x :05 20x :05 20x 5sec 20sec x 5       Postural correction slough over correct  3 sec x 10 3 sec x20 3 sec x 20 3 sec x20 3sec x 20       Seated Shoulder horizontal adduction stretch:B:   3x :30 3x :30 3x 30 sec        Mid back stretch seated             UE support or standing against wall or bench during standing exercises             SLR x 3:B:  20 20x 20x 20x 20       Hamstring curls:B:  20 NT 20x 20x 20       Mini squats   20x 20x 20x 20       Lunges:B:             Step ups:B:    15x 20x 20       Step downs:B:             SLS and Tandem stance:B:             Side stepping and tandem ambulation  5 5x each 5x each 5x ea        Paddle rows, horizontal adduction and shoulder abduction:B:   15x each 15x each 20x ea        Scapular squeeze   20x :05 30x :05 30x 5sec 30       Shoulder scaption and flexion   15x  15x  20x        Wall push ups:B:    20x NT                                  Pool pony hang                              Modalities

## 2019-02-14 ENCOUNTER — OFFICE VISIT (OUTPATIENT)
Dept: OBGYN CLINIC | Facility: CLINIC | Age: 34
End: 2019-02-14

## 2019-02-14 VITALS
HEART RATE: 90 BPM | DIASTOLIC BLOOD PRESSURE: 77 MMHG | WEIGHT: 124 LBS | BODY MASS INDEX: 21.17 KG/M2 | HEIGHT: 64 IN | SYSTOLIC BLOOD PRESSURE: 115 MMHG

## 2019-02-14 DIAGNOSIS — Z72.51 HIGH RISK HETEROSEXUAL BEHAVIOR: Primary | ICD-10-CM

## 2019-02-14 PROCEDURE — 87491 CHLMYD TRACH DNA AMP PROBE: CPT | Performed by: OBSTETRICS & GYNECOLOGY

## 2019-02-14 PROCEDURE — 87591 N.GONORRHOEAE DNA AMP PROB: CPT | Performed by: OBSTETRICS & GYNECOLOGY

## 2019-02-14 PROCEDURE — 99203 OFFICE O/P NEW LOW 30 MIN: CPT | Performed by: OBSTETRICS & GYNECOLOGY

## 2019-02-14 RX ORDER — TIZANIDINE 2 MG/1
TABLET ORAL
COMMUNITY
End: 2019-02-27

## 2019-02-14 RX ORDER — BUSPIRONE HYDROCHLORIDE 30 MG/1
TABLET ORAL
COMMUNITY
End: 2019-02-27

## 2019-02-14 RX ORDER — HYDROXYZINE HYDROCHLORIDE 10 MG/1
TABLET, FILM COATED ORAL
COMMUNITY

## 2019-02-14 RX ORDER — SERTRALINE HYDROCHLORIDE 100 MG/1
100 TABLET, FILM COATED ORAL EVERY MORNING
Refills: 0 | COMMUNITY
Start: 2019-02-07

## 2019-02-14 NOTE — PROGRESS NOTES
Yun Reddy is a 34 y o  female who presents for annual well woman exam      GYN:  · Denies vaginal discharge, labial erythema or lesions, dyspareunia  · Menarche at 17  · Patient does not have monthly menses  She denies intermenstrual bleeding, spotting, or discharge  · Patient had Mirena placed 2015 and is scheduled to replace/remove in   · Patient is sexually active with 1 male partner  · Her last Pap test was negative in  and she is due for cotesting in   · Denies gynecologic surgeries  OB:  ·  female  :  Patient has complicated medical history due to neurologic injury resulting in foot drop, numbness of her genitalia and inner thighs, as well as difficulty with ambulation  She also has neurogenic bladder, urinary incontinence and urgency  Breast:  · Denies breast mass, skin changes, dimpling, reddening, nipple retraction  · Denies breast discharge  · Patient does not have a family history of breast, endometrial, or ovarian ca  General:  · Patient reports good diet and exercise  Patient participates in physical therpay  · Work: She does not work  · ETOH use: occasional, 1 alcoholic beverage every 2 weeks  · Tobacco use: denies  · Recreational drug use: denies    Screening:  · STD screening: desired  Patient denies use of condoms for STD prevention    Review of Systems  Pertinent items are noted in HPI  Objective      /77 (BP Location: Left arm, Patient Position: Sitting, Cuff Size: Standard)   Pulse 90   Ht 5' 4" (1 626 m)   Wt 56 2 kg (124 lb)   LMP  (LMP Unknown)   BMI 21 28 kg/m²     Physical Exam   Constitutional: She is oriented to person, place, and time  She appears well-developed and well-nourished  No distress  Cardiovascular: Normal rate, regular rhythm and normal heart sounds  Exam reveals no gallop and no friction rub  No murmur heard  Pulmonary/Chest: Effort normal and breath sounds normal  No stridor   No respiratory distress  She has no wheezes  She has no rales  She exhibits no tenderness  Abdominal: Soft  She exhibits no distension and no mass  There is no tenderness  There is no rebound and no guarding  Genitourinary:   Genitourinary Comments: External genitalia grossly normal in appearance  No ulcerations, no lacerations, no lesions  Bimanual exam revealed anteverted uterus with normal contours and freely mobile  No adnexal masses palpated bilaterally  Vagina and cervix were grossly normal in appearance without any lacerations or lesions  No abnormal discharge in vaginal vault  Musculoskeletal:   Evidence of muscle wasting in legs bilaterally   Neurological: She is alert and oriented to person, place, and time  Skin: Skin is warm and dry  She is not diaphoretic  Psychiatric: She has a normal mood and affect  Her behavior is normal  Judgment and thought content normal           Assessment     Patient is a 34year old [de-identified] with Mirena IUD for contraception, up to date on pap testing, and desiring STD testing     Plan   1  Routine GYN care  - Mirena to be removed in 2020    - Pap test in 2020  - RTC 1 year or as needed    2  STD testing   - [address] [place] [address] collected  - HIV, Hep B, and RPR screening   - Will call patient if results positive   - Encouraged condom usage  Chantell Dobson MD  OB/GYN  2/14/2019  4:25 PM  Pager: [phone]

## 2019-02-15 ENCOUNTER — APPOINTMENT (OUTPATIENT)
Dept: PHYSICAL THERAPY | Age: 34
End: 2019-02-15
Payer: COMMERCIAL

## 2019-02-15 LAB
C TRACH DNA SPEC QL NAA+PROBE: NEGATIVE
N GONORRHOEA DNA SPEC QL NAA+PROBE: NEGATIVE

## 2019-02-18 ENCOUNTER — OFFICE VISIT (OUTPATIENT)
Dept: PHYSICAL THERAPY | Age: 34
End: 2019-02-18
Payer: COMMERCIAL

## 2019-02-18 DIAGNOSIS — G89.29 CHRONIC MIDLINE THORACIC BACK PAIN: Primary | ICD-10-CM

## 2019-02-18 DIAGNOSIS — M54.6 CHRONIC MIDLINE THORACIC BACK PAIN: Primary | ICD-10-CM

## 2019-02-18 PROCEDURE — 97113 AQUATIC THERAPY/EXERCISES: CPT

## 2019-02-18 NOTE — PROGRESS NOTES
Daily Note     Today's date: 2019  Patient name: Richy Griffith  : 1985  MRN: [id number]  Referring provider: Yane Newton DO  Dx:   Encounter Diagnosis     ICD-10-CM    1  Chronic midline thoracic back pain M54 6     G89 29                   Subjective: Pt  Reports that she has increased spine pain  Cardio pulmonary symptom, shallow breathing and mild chest pain sporadically but not daily   Pt  Also feels she is having       Objective: See treatment diary below      Assessment: Tolerated treatment well  Patient would benefit from continued PT      Plan: Continue per plan of care  Precautions: PMHx is remarkable for 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     Exercise Diary        Water walking: pre and post  5/5 5/5 5/5 5/5 5/5 5/5      Seated gastrocnemius stretch:B:  20sec x 5 20sec x5 20sec x 5 20 secx5 20sec x 5 20sec x 5      Seated Hamstring stretch:B:  20sec x 5 20sec x 5 20sec x 5 [id number] 20sec x 5 20sec x 5      LAQ:B:  20 30x 30x 30x 30 30      Hip flexion:B:  20 30x 30x 30x 30 30      Ankle AROM: all planes:B:  20 20x ea 20x ea 20x ea 20 20      DLS abdominal bracing   20x :05 20x :05 20x 5sec 20sec x 5 20sec x 5      Postural correction slough over correct  3 sec x 10 3 sec x20 3 sec x 20 3 sec x20 3sec x 20 X 20      Seated Shoulder horizontal adduction stretch:B:   3x :30 3x :30 3x 30 sec  30      Mid back stretch seated             UE support or standing against wall or bench during standing exercises             SLR x 3:B:  20 20x 20x 20x 20 20      Hamstring curls:B:  20 NT 20x 20x 20 20      Mini squats   20x 20x 20x 20 20      Lunges:B:             Step ups:B:    15x 20x 20 20      Step downs:B:             SLS and Tandem stance:B:             Side stepping and tandem ambulation  5 5x each 5x each 5x ea  5      Paddle rows, horizontal adduction and shoulder abduction:B:   15x each 15x each 20x ea  20x      Scapular squeeze   20x :05 30x :05 30x 5sec 30 30x      Shoulder scaption and flexion   15x  15x  20x  20x      Wall push ups:B:    20x NT                                  Pool pony hang       5 min                       Modalities

## 2019-02-19 RX ORDER — MIRABEGRON 50 MG/1
TABLET, FILM COATED, EXTENDED RELEASE ORAL
Qty: 90 TABLET | Refills: 2 | OUTPATIENT
Start: 2019-02-19

## 2019-02-22 ENCOUNTER — OFFICE VISIT (OUTPATIENT)
Dept: PHYSICAL THERAPY | Age: 34
End: 2019-02-22
Payer: COMMERCIAL

## 2019-02-22 ENCOUNTER — EVALUATION (OUTPATIENT)
Dept: PHYSICAL THERAPY | Age: 34
End: 2019-02-22
Payer: COMMERCIAL

## 2019-02-22 DIAGNOSIS — S34.101S: ICD-10-CM

## 2019-02-22 DIAGNOSIS — M54.6 CHRONIC MIDLINE THORACIC BACK PAIN: Primary | ICD-10-CM

## 2019-02-22 DIAGNOSIS — M54.16 LUMBAR RADICULOPATHY: ICD-10-CM

## 2019-02-22 DIAGNOSIS — G89.29 CHRONIC MIDLINE THORACIC BACK PAIN: Primary | ICD-10-CM

## 2019-02-22 PROCEDURE — 97113 AQUATIC THERAPY/EXERCISES: CPT

## 2019-02-22 NOTE — PROGRESS NOTES
Daily Note     Today's date: 2019  Patient name: Leeann Horton  : 1985  MRN: [id number]  Referring provider: Elena Alegria DO  Dx:   Encounter Diagnosis     ICD-10-CM    1  Chronic midline thoracic back pain M54 6     G89 29                   Subjective: Spine pain is better today  She is still experiencing cardiopulmonary symptoms with the shallow breathing and mild chest pain  Reports she is experiencing left side abdominal pain, believes to be related to scar tissue build up  Has a doctors appointment this week for the cardiopulmonary symptoms  Objective: See treatment diary below      Assessment: Tolerated treatment well  Patient would benefit from continued PT  Ended session early today due to pool being over crowded  Plan: Continue per plan of care  Precautions: PMHx is remarkable for 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     Exercise Diary       Water walking: pre and post  5/5 5/5 5/5 5/5 5/5 5/5 5/5     Seated gastrocnemius stretch:B:  20sec x 5 20sec x5 20sec x 5 20 secx5 20sec x 5 20sec x 5 20 secx5     Seated Hamstring stretch:B:  20sec x 5 20sec x 5 20sec x 5 [id number] 20sec x 5 20sec x 5 [id number]     LAQ:B:  20 30x 30x 30x 30 30      Hip flexion:B:  20 30x 30x 30x 30 30 30     Ankle AROM: all planes:B:  20 20x ea 20x ea 20x ea 20 20 20     DLS abdominal bracing   20x :05 20x :05 20x 5sec 20sec x 5 20sec x 5      Postural correction slough over correct  3 sec x 10 3 sec x20 3 sec x 20 3 sec x20 3sec x 20 X 20 x20     Seated Shoulder horizontal adduction stretch:B:   3x :30 3x :30 3x 30 sec  30 3x 30sec     Mid back stretch seated             UE support or standing against wall or bench during standing exercises             SLR x 3:B:  20 20x 20x 20x 20 20 20     Hamstring curls:B:  20 NT 20x 20x 20 20      Mini squats   20x 20x 20x 20 20 NT     Lunges:B:             Step ups:B:    15x 20x 20 20 20     Step downs:B:             SLS and Tandem stance:B:             Side stepping and tandem ambulation  5 5x each 5x each 5x ea  5 NT     Paddle rows, horizontal adduction and shoulder abduction:B:   15x each 15x each 20x ea  20x NT     Scapular squeeze   20x :05 30x :05 30x 5sec 30 30x NT     Shoulder scaption and flexion   15x  15x  20x  20x NT     Wall push ups:B:    20x NT   NT                               Pool pony hang       5 min NT                      Modalities

## 2019-02-22 NOTE — PROGRESS NOTES
PT Evaluation  / PT Reassessment / PT Discharge      Today's date: 2019  Patient name: Dori Machado  : 1985  MRN: [id number]  Referring provider: Greg Sparrow DO  Dx:   Encounter Diagnosis     ICD-10-CM    1  Chronic midline thoracic back pain M54 6     G89 29    2  Lumbar radiculopathy M54 16    3  Injury to L1 level of spinal cord, sequela ([address] ) S34 101S                   Assessment  Assessment details: PT Reassessment 2019: patient reported she is getting more spinal and left trunk pain since onset of pool based PT  Patient noted her bilateral hip strength has improved since onset of PT  But, she noted the following deficits still persist:  Standing activities like cooking and cleaning and shopping, driving, sitting upright for long periods which is > than 60 minutes  Patient noted all upright ( sitting, standing and walking ) as well as lying supine prolonged are all limited by thoracic spine and left trunk region pain at incision site  PT IE:  Patient reported in  she injured her lumbar spine region when she fell out of a tree  Patient noted she had T 12 - L 2 fusion soon after fall  Patient noted she initially lost bilateral le motor but after one month she regained her bilateral le strength except for bilateral ankle DF and PF  Patient noted she still has bilateral hip weakness in abduction and extension  Patient noted she uses spc and bilateral AFO for gait activities  Patient noted then two years ago she started with middle back region / thoracic spine region pain  Patient noted prolonged sitting with work activities produced thoracic spine pain  Patient noted she has been unsuccessful in thoracic spine pain reduction since    Patient reported thoracic spine pain limits grocery shopping, cooking, prolonged standing activities, prolonged sitting activities, driving with hand controls and sleep is deprived if iadls aggravate thoracic spine region pain as functional activities limited by pain aggravation  Patient noted heat is effective in thoracic spine pain reduction as well as yoga as stretching activities into bilateral shoulder and spinal extension movements as well as cat / camel stretching  Patient reported she tried land based PT which was aggravating especially lumbar spine rotation  Patient denies surgeon or doctor specific issues or restrictions  Patient noted she has bilateral feet and gastrocnemius numbness that persists since onset of fall and T 12- L 2 fusion  Patient reported pain aggravation is typical to the thoracic spine inferior to bilateral scapular region  Patient noted her thoracic spine symptoms limit her bilateral ue functional mobility  Impairments: abnormal or restricted ROM, abnormal movement, impaired physical strength, pain with function and safety issue  Understanding of Dx/Px/POC: excellent   Prognosis: good  Prognosis details: Patient is a 34y o  year old female seen for outpatient PT evaluation with pain and mobility deficits due to thoracic spine region pain  Patient presents with the following progress since onset of PT: increase in cervical and lumbar spine ROM, and increase in bilateral le MMT  But, she continues to present with the following problems, concerns, deficits and impairments that still presist: thoracic spine region pain, decreased lumbar spine range of motion, decreased bilateral ue and le ROM and strength, + TTP, decrease in postural awareness, gait and stair dysfunctions, balance deficits, functional limitations and decreased tolerance to activity    Patient would benefit from skilled PT services under the following PT treatment plan to address the above noted deficits: Pool and Land based therapeutic exercises and activities to facilitate bilateral lumbar and cervical spine as well as bilateral UE and LE rom and strength, postural reeducation and strengthening, DLS and abdominal bracing, gait and stair training, balance and proprioception activities, modalities, manual therapy techniques, IASTM techniques, Kinesio taping techniques and a hep  Thank you for the referral   But, due to thoracic pain, gait dysfunctions, balance deficits due to distal bilateral le weakness PT to d/c with pt in agreement in PT d/c to hep  Goals  Short Term goals - 4 weeks  1  Patient will be independent HEP  2   Patient will report a 25 - 50% decrease in pain complaints  3   Increase strength 1/2 grade  4   Increase ROM 5-10 degrees  Long Term goals - 8 weeks  1  Patient will report elimination of pain complaints  2   Patient will return to all work related activities without restriction  3   Patient will return to all recreational activities without restriction  4   ROM WFL  5   Strength 5/5   6   Patient will report grocery shopping and cooking activities improved > 25 %  7   Patient will report prolonged standing activities improved > 25%  8   Patient will report prolonged sitting activities improved > 25%  9   Patient will report driving improved > 25 %  10   Patient will report sleep improved > 25 %  11   Patient will report self foto computerized functional index improved > = 52      Plan  Patient would benefit from: skilled physical therapy  Planned modality interventions: cryotherapy, TENS, thermotherapy: hydrocollator packs and unattended electrical stimulation  Planned therapy interventions: manual therapy, activity modification, ADL retraining, ADL training, aquatic therapy, neuromuscular re-education, patient education, self care, compression, strengthening, stretching, therapeutic activities, therapeutic exercise, therapeutic training, transfer training, flexibility, functional ROM exercises, gait training, graded activity, graded exercise, graded motor, home exercise program and balance  Frequency: 2x week  Duration in weeks: 8        Subjective Evaluation    History of Present Illness  Mechanism of injury: PMHx is remarkable for SCI at L 1 with T12-L2 fusion and L1 corpectomy , Bipolar disorder, depression, anxiety, anemia, bilateral foot drop, FALL RISK, and neurogenic bladder  MRI THORACIC SPINE WITHOUT CONTRAST     INDICATION: S34 101D: Unspecified injury to L1 level of lumbar spinal cord, subsequent encounter  [id number]: Personal history of other (healed) physical injury and trauma      COMPARISON:  9/6/2018     TECHNIQUE:  Sagittal T1, sagittal T2, sagittal inversion recovery, axial T2,  axial 2D MERGE      IMAGE QUALITY: Diagnostic      FINDINGS:     ALIGNMENT: Normal alignment of the thoracic spine  No compression fracture  No subluxation  No scoliosis      MARROW SIGNAL:  Spinal construct at the thoracolumbar junction redemonstrated with associated artifact  Screws in the T12 vertebra corpectomy of L1 and screws in the L2 vertebra are better characterized on the prior thoracic myelogram      THORACIC CORD: The caudal aspect of the spinal cord is displaced anteriorly in the spinal canal at the L1 segment, displaced by a CSF signal intensity structure, possibly an arachnoid cyst or scar tissue, seen on image 49, series 7  The conus medullaris   appears to terminate at the superior endplate of L1  There is a small amount of signal abnormality in the right side of the spinal cord/conus medullaris at the superior endplate of L1, image 48, series 51, suspicious for a small amount of myelomalacia      PARAVERTEBRAL SOFT TISSUES:  Normal      THORACIC DEGENERATIVE CHANGE: No disc herniation, canal stenosis or foraminal narrowing  No degenerative changes      IMPRESSION:        1  Spinal construct at the thoracolumbar junction is better characterized on the prior thoracic myelogram  2    Anterior displacement of the conus medullaris possibly secondary to an arachnoid cyst and/or scar tissue, similar to the prior myelogram   3   Small amount of signal abnormality in the right side of the conus medullaris, suspicious for myelomalacia, immediately adjacent to the spinal construct  4  There is no focal disk herniation, central canal or neural foraminal narrowing  Quality of life: good    Pain  At best pain ratin  At worst pain ratin  Location: thoracic spine region pain    Patient Goals  Patient goals for therapy: decreased pain, increased motion, increased strength and independence with ADLs/IADLs  Patient goal: Patient's goal:" to reduce pain and move better"  Objective     Tenderness     Additional Tenderness Details  Patient is + minimal TTP at thoracic spine region pain and left lateral trunk region and she exhibits decrease in thoracic spine kyphosis  Active Range of Motion   Cervical/Thoracic Spine       Cervical    Flexion: 45 degrees   Extension: 45 degrees      Left lateral flexion: 35 degrees      Right lateral flexion: 38 degrees      Left rotation: 75 degrees  Right rotation: 75 degrees         Left Shoulder   Flexion: 158 degrees   Abduction: 172 degrees   External rotation 90°: 90 degrees   Internal rotation 90°: 70 degrees     Right Shoulder   Flexion: 164 degrees   Abduction: 170 degrees   External rotation 90°: 100 degrees  Internal rotation 90°: 72 degrees     Lumbar   Flexion: 110 degrees   Extension: 25 degrees  with pain  Left lateral flexion: 25 degrees       Right lateral flexion: 28 degrees   Left rotation: 40 degrees   Right rotation: 44 degrees   Left Hip   Flexion: 132 degrees   Abduction: 30 degrees     Right Hip   Flexion: 132 degrees   Abduction: 28 degrees   Left Knee   Flexion: 142 degrees   Extension: -4 degrees   Extensor la degrees     Right Knee   Flexion: 142 degrees   Extension: -5 degrees   Extensor la degrees     Additional Active Range of Motion Details  slr on right at 70 and left at 80 degrees      Strength/Myotome Testing     Left Shoulder     Planes of Motion   Flexion: 4+   Abduction: 4+   External rotation at 0°: 4+   Internal rotation at 0°: 5     Right Shoulder     Planes of Motion   Flexion: 4+   Abduction: 4+   External rotation at 0°: 4+   Internal rotation at 0°: 5     Left Elbow   Flexion: 5  Extension: 5    Right Elbow   Flexion: 5  Extension: 5    Left Hip   Planes of Motion   Flexion: 5  Extension: 4  Abduction: 4-  Adduction: 5    Right Hip   Planes of Motion   Flexion: 5  Extension: 4  Abduction: 4-  Adduction: 5    Left Knee   Flexion: 4+  Extension: 5    Right Knee   Flexion: 4+  Extension: 5    Left Ankle/Foot   Dorsiflexion: 2-  Plantar flexion: 0    Right Ankle/Foot   Dorsiflexion: 1  Plantar flexion: 0    Ambulation     Ambulation: Level Surfaces     Additional Level Surfaces Ambulation Details  Patient ambulates with spc with decrease in pace, bilateral hip in ER, bilateral ankle AFO, decrease in bilateral step length and increase in base of support  Ambulation: Stairs   Ascend stairs: independent  Pattern: non-reciprocal  Railings: two rails  Descend stairs: independent  Pattern: non-reciprocal  Railings: two rails          Precautions: PMHx is remarkable for SCI at L 1 with T12-L2 fusion and L1 corpectomy , Bipolar disorder, depression, anxiety, anemia, bilateral foot drop, FALL RISK, and neurogenic bladder  Fall Risk

## 2019-02-25 ENCOUNTER — APPOINTMENT (OUTPATIENT)
Dept: PHYSICAL THERAPY | Age: 34
End: 2019-02-25
Payer: COMMERCIAL

## 2019-02-25 DIAGNOSIS — N32.81 OAB (OVERACTIVE BLADDER): Primary | ICD-10-CM

## 2019-02-25 NOTE — TELEPHONE ENCOUNTER
Regarding: Prescription Question  Contact: [phone]  ----- Message from [address] [place] [address] [address], Generic sent at 2/25/2019 11:24 AM EST -----    Hi,  I got a message from my pharmacy that said the doctor denied their request to refill my myrbetriq  Im almost out and wondering why it was denied  Can you please order a refill or do I have to come in? Nothing has changed for me this is the same medication Vimal Fair been taking for years    Thanks, Zeenat Peraza

## 2019-02-27 LAB
HBV SURFACE AG SERPL QL IA: NORMAL
HIV 1+2 AB+HIV1 P24 AG SERPL QL IA: NORMAL
RPR SER QL: NORMAL

## 2019-03-01 ENCOUNTER — OFFICE VISIT (OUTPATIENT)
Dept: FAMILY MEDICINE CLINIC | Facility: CLINIC | Age: 34
End: 2019-03-01
Payer: COMMERCIAL

## 2019-03-01 VITALS
WEIGHT: 122 LBS | BODY MASS INDEX: 20.83 KG/M2 | SYSTOLIC BLOOD PRESSURE: 110 MMHG | HEIGHT: 64 IN | DIASTOLIC BLOOD PRESSURE: 70 MMHG | HEART RATE: 88 BPM | TEMPERATURE: 98 F

## 2019-03-01 DIAGNOSIS — R53.83 FATIGUE, UNSPECIFIED TYPE: ICD-10-CM

## 2019-03-01 DIAGNOSIS — R07.89 CHEST TIGHTNESS: Primary | ICD-10-CM

## 2019-03-01 DIAGNOSIS — R06.83 SNORING: ICD-10-CM

## 2019-03-01 DIAGNOSIS — R41.3 MEMORY CHANGES: ICD-10-CM

## 2019-03-01 DIAGNOSIS — R00.2 POUNDING HEARTBEAT: ICD-10-CM

## 2019-03-01 DIAGNOSIS — G89.29 CHRONIC MIDLINE THORACIC BACK PAIN: ICD-10-CM

## 2019-03-01 DIAGNOSIS — M54.6 CHRONIC MIDLINE THORACIC BACK PAIN: ICD-10-CM

## 2019-03-01 DIAGNOSIS — R61 NIGHT SWEATS: ICD-10-CM

## 2019-03-01 LAB — SL AMB POCT URINE HCG: NEGATIVE

## 2019-03-01 PROCEDURE — 99215 OFFICE O/P EST HI 40 MIN: CPT | Performed by: FAMILY MEDICINE

## 2019-03-01 PROCEDURE — 3008F BODY MASS INDEX DOCD: CPT | Performed by: FAMILY MEDICINE

## 2019-03-01 PROCEDURE — 81025 URINE PREGNANCY TEST: CPT | Performed by: FAMILY MEDICINE

## 2019-03-01 PROCEDURE — 93000 ELECTROCARDIOGRAM COMPLETE: CPT | Performed by: FAMILY MEDICINE

## 2019-03-01 NOTE — PROGRESS NOTES
FAMILY PRACTICE OFFICE VISIT       NAME: Leeann Horton  AGE: 34 y o  SEX: female       : 1985        MRN: [id number]    DATE: 3/5/2019  TIME: 10:09 PM    Assessment and Plan     Problem List Items Addressed This Visit        Other    Chronic midline thoracic back pain    Chest tightness - Primary    Relevant Orders    Echo complete with contrast if indicated    Complete pulmonary function test    TSH, 3rd generation with Free T4 reflex    POCT ECG (Completed)    Pounding heartbeat    Relevant Orders    Holter monitor - 24 hour    TSH, 3rd generation with Free T4 reflex    Night sweats    Relevant Orders    XR chest pa & lateral    TSH, 3rd generation with Free T4 reflex    POCT urine HCG (Completed)    Snoring    Relevant Orders    Ambulatory referral to Sleep Medicine    Memory changes    Relevant Orders    Ambulatory referral to Neuropsychology    TSH, 3rd generation with Free T4 reflex    Vitamin B12      Other Visit Diagnoses     Fatigue, unspecified type            Chest tightness, pounding heartbeat:  POC EKG with NSR  No prior EKG available for comparison  Will order echocardiogram, Holter monitor, PFT  Return/ER parameters discussed  History of night sweats: Will check TSH, routine blood work, order chest x-ray  Unclear etiology  Snoring:  Patient admits to daytime fatigue  I would like patient to be further evaluated by sleep specialist   She is agreeable with this plan  Referral provided  Memory changes: Will check blood work  I feel patient would benefit from neuropsychological testing  Referral provided  Chronic thoracic back pain:  Patient will keep scheduled follow-up appointment with pain management  I feel patient would benefit from chiropractic care, acupuncture  She will consider  There are no Patient Instructions on file for this visit    I have spent 40 minutes with Patient  today in which greater than 50% of this time was spent in counseling/coordination of care regarding Diagnostic results, Prognosis, Risks and benefits of tx options, Intructions for management, Patient and family education, Importance of tx compliance, Risk factor reductions and Impressions  Chief Complaint     Chief Complaint   Patient presents with    Follow-up     Patient is here for a follow up visit  History of Present Illness     HPI  79-year-old female here for follow-up  Patient states she has been experiencing more mid back pain since beginning of January   went on a flight that was 6 hrs and back pack was too heavy   pain mgmt states it is more in the ligaments  Was advised to apply lidocaine patches and do PT, which are not helping    Disconrinued cyclobenzaprine and started tizandine, does not help much   Had injections last summer, did not help  She will consider chiropractic care, acupuncture, cbd   has had left sided pain, comes goes, worsended by twisting, lays on it   better with not moving   has applied heat, which helps    Has had constipation, takes fiber, sometimes a stool softener   has had daily BMs, however does strain     does stretching daily, more movement in general    Has been tired    Experiences tightness, sometimes pain in the left side of chest, pinching since past 1 month, occurring every 2-3 days, lasts for 1 hr  Comes on when her back pain comes on   feels it may be related to anxiety or her back pain   sometimes feels her heart is pounding, feels she may not be getting enough air in  Has been trying to do deep breathing techniques  Does get lightheaded quicly and tingling  In the hands and feet   can usually walk a block or 2, feels tired  very little exercise exerts her body as if she is doing jumping jacks    Experienced fatigue While doing aquatic therapy was in pool did exercises, walking around in circles for 10 min  Has been lying down a lot for the past 6 months    Has had some sleep dsturbance   snores   has had night sweats the last week, wakes up in the middle of the night and has to change her shirt  Has been having dreams, feels she is not getting into deeper sleep    Has trouble with memory, word recall   feels she hears the conversation, however does not remember the conversation   has always had problems with this but is getting worse     Review of Systems   Review of Systems   Constitutional: Positive for fatigue  Negative for unexpected weight change  HENT: Negative  Eyes: Negative for visual disturbance  Respiratory: Positive for chest tightness  Negative for shortness of breath  Cardiovascular: Negative for chest pain and leg swelling  Gastrointestinal: Positive for constipation  Negative for abdominal pain  Musculoskeletal: Positive for back pain  Neurological: Positive for light-headedness  Psychiatric/Behavioral: Positive for sleep disturbance  Negative for dysphoric mood         Active Problem List     Patient Active Problem List   Diagnosis    Paronychia of toe of right foot    Constipation    Weight loss    H/O spinal cord injury    Chronic midline thoracic back pain    Low vitamin B12 level    Vegetarian diet    Injury to L1 level of spinal cord (HCC)    Bipolar affective disorder (HCC)    Lumbar radiculopathy    Myofascial pain    Left flank pain    Abnormal urinalysis    Urinary urgency    Chest tightness    Pounding heartbeat    Night sweats    Snoring    Memory changes       Past Medical History:  Past Medical History:   Diagnosis Date    Anemia     Anxiety     Depression     Foot drop     History of spinal cord injury     at L1    Mental disorder     Small bowel problem        Past Surgical History:  Past Surgical History:   Procedure Laterality Date    CYSTOSCOPY      Diagnostic; onset: 1/3/17    FL MYELOGRAM 2 OR MORE REGIONS  9/6/2018    WA COLONOSCOPY FLX DX W/COLLJ SPEC WHEN PFRMD N/A 12/12/2017    Procedure: COLONOSCOPY;  Surgeon: Justus Sahni MD;  [facility]; Service: Gastroenterology    SPINAL FUSION         Family History:  Family History   Problem Relation Age of Onset    Diabetes Father     Lymphoma Father         Waldenstrom's    Heart disease Father         cardiac disorder    Hypertension Father     Pulmonary embolism Father     Hyperlipidemia Father     Stroke Family        Social History:  Social History     Socioeconomic History    Marital status: Single     Spouse name: Not on file    Number of children: Not on file    Years of education: Not on file    Highest education level: Not on file   Occupational History    Not on file   Social Needs    Financial resource strain: Not on file    Food insecurity:     Worry: Not on file     Inability: Not on file    Transportation needs:     Medical: Not on file     Non-medical: Not on file   Tobacco Use    Smoking status: Former Smoker     Packs/day: 1 00     Last attempt to quit: 2016     Years since quittin 2    Smokeless tobacco: Never Used    Tobacco comment: current every day smoker, former smoker and smokes less than 1 ppd as per [employer]   Substance and Sexual Activity    Alcohol use: Yes     Frequency: Monthly or less     Comment: 2 wkly    Drug use: No     Comment: Drug use as per [employer]    Sexual activity: Yes     Partners: Male     Birth control/protection: IUD   Lifestyle    Physical activity:     Days per week: Not on file     Minutes per session: Not on file    Stress: Not on file   Relationships    Social connections:     Talks on phone: Not on file     Gets together: Not on file     Attends Synagogue service: Not on file     Active member of club or organization: Not on file     Attends meetings of clubs or organizations: Not on file     Relationship status: Not on file    Intimate partner violence:     Fear of current or ex partner: Not on file     Emotionally abused: Not on file     Physically abused: Not on file     Forced sexual activity: Not on file   Other Topics Concern    Not on file   Social History Narrative    Not on file     I have reviewed the patient's medical history in detail; there are no changes to the history as noted in the electronic medical record  Objective     Vitals:    03/01/19 1440   BP: 110/70   Pulse: 88   Temp: 98 °F (36 7 °C)     Wt Readings from Last 3 Encounters:   03/01/19 55 3 kg (122 lb)   02/14/19 56 2 kg (124 lb)   01/10/19 57 2 kg (126 lb)         Physical Exam   Constitutional: She is oriented to person, place, and time  She appears well-developed and well-nourished  HENT:   Head: Normocephalic and atraumatic  Mouth/Throat: Oropharynx is clear and moist    Eyes: Pupils are equal, round, and reactive to light  Conjunctivae and EOM are normal    Neck: Normal range of motion  Neck supple  No thyromegaly present  Cardiovascular: Normal rate, regular rhythm and normal heart sounds  Pulmonary/Chest: Effort normal and breath sounds normal    Abdominal: Soft  Bowel sounds are normal  She exhibits no distension  There is no tenderness  Musculoskeletal: Normal range of motion  She exhibits no edema  Lymphadenopathy:     She has no cervical adenopathy  Neurological: She is alert and oriented to person, place, and time  Psychiatric: She has a normal mood and affect  Nursing note and vitals reviewed        Pertinent Laboratory/Diagnostic Studies:  Lab Results   Component Value Date    GLUCOSE 92 12/28/2015    BUN 12 03/06/2018    CREATININE 0 62 06/21/2017    CALCIUM 9 1 03/06/2018     06/21/2017    K 4 0 03/06/2018    CO2 28 03/06/2018     03/06/2018     Lab Results   Component Value Date    ALT 12 03/06/2018    AST 13 03/06/2018    ALKPHOS 54 03/06/2018    BILITOT 0 5 06/21/2017       Lab Results   Component Value Date    WBC 3 9 03/06/2018    HGB 13 4 03/06/2018    HCT 39 3 03/06/2018    MCV 94 2 03/06/2018     09/06/2018       No results found for: TSH    Lab Results   Component Value Date    CHOL 181 08/23/2014     Lab Results   Component Value Date    TRIG 112 08/23/2014     Lab Results   Component Value Date    HDL 73 08/23/2014     Lab Results   Component Value Date    LDLCALC 86 08/23/2014     Lab Results   Component Value Date    HGBA1C 4 8 02/01/2017       Results for orders placed or performed in visit on 03/01/19   POCT urine HCG   Result Value Ref Range    URINE HCG Negative        Orders Placed This Encounter   Procedures    XR chest pa & lateral    TSH, 3rd generation with Free T4 reflex    Vitamin B12    Ambulatory referral to Sleep Medicine    Ambulatory referral to Neuropsychology    Holter monitor - 24 hour    POCT urine HCG    POCT ECG    Echo complete with contrast if indicated    Complete pulmonary function test       ALLERGIES:  Allergies   Allergen Reactions    Sulfamethoxazole Nausea Only and GI Intolerance       Current Medications     Current Outpatient Medications   Medication Sig Dispense Refill    busPIRone (BUSPAR) 10 mg tablet Take 1 tablet by mouth 3 (three) times a day      carBAMazepine (TEGretol) 200 mg tablet Take 200 mg by mouth 2 (two) times a day        hydrOXYzine HCL (ATARAX) 10 mg tablet hydroxyzine HCl 10 mg tablet      levonorgestrel (MIRENA) 20 MCG/24HR IUD 20 mcg/day      Mirabegron ER (MYRBETRIQ) 50 MG TB24 Take 1 tablet (50 mg total) by mouth daily 30 tablet 11    oxybutynin (DITROPAN-XL) 5 mg 24 hr tablet Take 1 tablet (5 mg total) by mouth daily 30 tablet 11    sertraline (ZOLOFT) 100 mg tablet Take 100 mg by mouth every morning  0    tiZANidine (ZANAFLEX) 2 mg tablet Take 1 tablet (2 mg total) by mouth every 8 (eight) hours as needed for muscle spasms 90 tablet 2     No current facility-administered medications for this visit            Health Maintenance     Health Maintenance   Topic Date Due    PT PLAN OF CARE  02/20/2019    Depression Screening PHQ  02/14/2020    BMI: Adult  03/01/2020    PAP SMEAR  10/29/2020    CRC Screening: Colonoscopy  12/12/2022    DTaP,Tdap,and Td Vaccines (2 - Td) 04/19/2026    INFLUENZA VACCINE  Completed    HEPATITIS B VACCINES  Aged Out     Immunization History   Administered Date(s) Administered    INFLUENZA 11/01/2015, 03/01/2017, 01/01/2018, 11/01/2018    Influenza Quadrivalent Preservative Free 3 years and older IM 01/18/2017, 10/03/2017    Influenza TIV (IM) 11/12/2015    Influenza, injectable, quadrivalent, preservative free 0 5 mL 11/01/2018    Tdap 04/19/2016       Jeremie Ba MD

## 2019-03-05 PROBLEM — R41.3 MEMORY CHANGES: Status: ACTIVE | Noted: 2019-03-05

## 2019-03-05 PROBLEM — R61 NIGHT SWEATS: Status: ACTIVE | Noted: 2019-03-05

## 2019-03-05 PROBLEM — R07.89 CHEST TIGHTNESS: Status: ACTIVE | Noted: 2019-03-05

## 2019-03-05 PROBLEM — R06.83 SNORING: Status: ACTIVE | Noted: 2019-03-05

## 2019-03-05 PROBLEM — R00.2 POUNDING HEARTBEAT: Status: ACTIVE | Noted: 2019-03-05

## 2019-03-08 ENCOUNTER — TELEPHONE (OUTPATIENT)
Dept: FAMILY MEDICINE CLINIC | Facility: CLINIC | Age: 34
End: 2019-03-08

## 2019-03-11 ENCOUNTER — HOSPITAL ENCOUNTER (OUTPATIENT)
Dept: PULMONOLOGY | Facility: HOSPITAL | Age: 34
Discharge: HOME | End: 2019-03-11
Payer: COMMERCIAL

## 2019-03-11 DIAGNOSIS — R07.89 CHEST TIGHTNESS: ICD-10-CM

## 2019-03-11 PROCEDURE — 94729 DIFFUSING CAPACITY: CPT

## 2019-03-11 PROCEDURE — 94060 EVALUATION OF WHEEZING: CPT

## 2019-03-11 PROCEDURE — 94060 EVALUATION OF WHEEZING: CPT | Performed by: INTERNAL MEDICINE

## 2019-03-11 PROCEDURE — 94726 PLETHYSMOGRAPHY LUNG VOLUMES: CPT

## 2019-03-11 PROCEDURE — 94726 PLETHYSMOGRAPHY LUNG VOLUMES: CPT | Performed by: INTERNAL MEDICINE

## 2019-03-11 PROCEDURE — 94760 N-INVAS EAR/PLS OXIMETRY 1: CPT

## 2019-03-11 PROCEDURE — 94729 DIFFUSING CAPACITY: CPT | Performed by: INTERNAL MEDICINE

## 2019-03-11 RX ORDER — ALBUTEROL SULFATE 2.5 MG/3ML
2.5 SOLUTION RESPIRATORY (INHALATION) ONCE
Status: COMPLETED | OUTPATIENT
Start: 2019-03-11 | End: 2019-03-11

## 2019-03-11 RX ADMIN — ALBUTEROL SULFATE 2.5 MG: 2.5 SOLUTION RESPIRATORY (INHALATION) at 10:22

## 2019-03-12 ENCOUNTER — HOSPITAL ENCOUNTER (OUTPATIENT)
Dept: NON INVASIVE DIAGNOSTICS | Facility: CLINIC | Age: 34
Discharge: HOME | End: 2019-03-12
Payer: COMMERCIAL

## 2019-03-12 DIAGNOSIS — R00.2 POUNDING HEARTBEAT: ICD-10-CM

## 2019-03-12 PROCEDURE — 93226 XTRNL ECG REC<48 HR SCAN A/R: CPT

## 2019-03-12 PROCEDURE — 93225 XTRNL ECG REC<48 HRS REC: CPT

## 2019-03-13 ENCOUNTER — TELEPHONE (OUTPATIENT)
Dept: FAMILY MEDICINE CLINIC | Facility: CLINIC | Age: 34
End: 2019-03-13

## 2019-03-13 DIAGNOSIS — R79.89 LOW THYROXINE (T4) LEVEL: ICD-10-CM

## 2019-03-13 DIAGNOSIS — R41.3 MEMORY CHANGES: ICD-10-CM

## 2019-03-13 DIAGNOSIS — E78.00 ELEVATED CHOLESTEROL: Primary | ICD-10-CM

## 2019-03-13 NOTE — TELEPHONE ENCOUNTER
Spoke to patient regarding blood work results  WBC is a little lower  Patient is on carbamazepine, likely due to this however I would like her to call her psychiatrist to discuss this further  She will let me know the outcome of this  In addition, her T4 was minimally decreased  I would like to recheck this  If it is persistently decreased, I would like her to be further evaluated by an endocrinologist   She is agreeable with this  In addition, her total cholesterol was minimally increased however good cholesterol was in a normal and good range  Would like her to work on the [employer] and we will recheck cholesterol in 4-6 months

## 2019-03-13 NOTE — TELEPHONE ENCOUNTER
Regarding: Test Results Question  Contact: [phone]  ----- Message from [address] [place] [address] [address], Generic sent at 3/13/2019  4:11 PM EDT -----    Hi,  Last time I was in we were discussing blood tests my psychiatrist ordered and your office requested them to be faxed over from [facility]  I have since seen the results online and want to talk to you about them   I also need a different referral for a neuropsych exam    Thanks   Xavier Barreto

## 2019-03-15 ENCOUNTER — TELEPHONE (OUTPATIENT)
Dept: FAMILY MEDICINE CLINIC | Facility: CLINIC | Age: 34
End: 2019-03-15

## 2019-03-15 PROCEDURE — 93227 XTRNL ECG REC<48 HR R&I: CPT | Performed by: INTERNAL MEDICINE

## 2019-03-15 NOTE — PROCEDURES
PT NAME: Laci Day  : 1985  AGE: 34 y o  GENDER: female  MRN: [id number]   PROCEDURE: Holter monitor - 24 hour      24 hour Holter monitor report    INDICATIONS: Palpitations    DESCRIPTION OF FINDINGS:  The patient was monitored for a total of 24 hours  The patient was predominantly in normal sinus rhythm throughout the study  The average heart rate was 55 beats per minute  The heart rate ranged from a low of 55 beats per minute in sinus bradycardia at 10:32 PM to a maximum of 136 beats per minute in sinus tachycardia at 11:46 AM     There were no ventricular ectopic activity noted during the monitoring period  There was no sustained or nonsustained ventricular tachycardia  Supraventricular ectopic activity consisted of 58 beats (0 0% of total beats), of which, 40 were single PACs, 6 were atrial couplets  There was 1 short run of atrial tachycardia  There was no other supraventricular tachycardia identified  There was no evidence of atrial fibrillation or atrial flutter  There were no significant pauses  The longest R-R interval was 1 4 seconds  There was no evidence of advanced degree heart block  The accompanying patient diary notes symptoms of "tightness", "pinching feeling", "shortness of breath"  Correlation with the tracings at these times reveals normal sinus rhythm  IMPRESSION:  1  Predominantly normal sinus rhythm, with an average heart rate of 84 beats per minute  2  Patient reported symptoms of ""tightness", "pinching feeling", "shortness of breath" did not correlate with any significant arrhythmia  3  Rare premature atrial contractions  4   No significant pauses or advanced degree heart block      Cardiology Fellow: Malu Mckenzie MD  Attending Physician: Merritt Cook DO

## 2019-03-15 NOTE — TELEPHONE ENCOUNTER
----- Message from Denise Conn MD sent at 3/15/2019  5:09 PM EDT -----  Can you please let patient know that her Holter monitor was overall normal   Thank you

## 2019-03-20 ENCOUNTER — HOSPITAL ENCOUNTER (OUTPATIENT)
Dept: NON INVASIVE DIAGNOSTICS | Facility: HOSPITAL | Age: 34
Discharge: HOME | End: 2019-03-20
Payer: COMMERCIAL

## 2019-03-20 ENCOUNTER — HOSPITAL ENCOUNTER (OUTPATIENT)
Dept: RADIOLOGY | Facility: HOSPITAL | Age: 34
Discharge: HOME | End: 2019-03-20
Payer: COMMERCIAL

## 2019-03-20 ENCOUNTER — TELEPHONE (OUTPATIENT)
Dept: FAMILY MEDICINE CLINIC | Facility: CLINIC | Age: 34
End: 2019-03-20

## 2019-03-20 ENCOUNTER — LAB (OUTPATIENT)
Dept: LAB | Facility: HOSPITAL | Age: 34
End: 2019-03-20
Payer: COMMERCIAL

## 2019-03-20 ENCOUNTER — TRANSCRIBE ORDERS (OUTPATIENT)
Dept: RADIOLOGY | Facility: HOSPITAL | Age: 34
End: 2019-03-20

## 2019-03-20 DIAGNOSIS — R41.3 MEMORY CHANGES: ICD-10-CM

## 2019-03-20 DIAGNOSIS — R61 NIGHT SWEATS: ICD-10-CM

## 2019-03-20 DIAGNOSIS — R79.89 LOW THYROXINE (T4) LEVEL: ICD-10-CM

## 2019-03-20 DIAGNOSIS — R07.89 CHEST TIGHTNESS: ICD-10-CM

## 2019-03-20 LAB
T4 SERPL-MCNC: 5.6 UG/DL (ref 4.7–13.3)
VIT B12 SERPL-MCNC: 463 PG/ML (ref 100–900)

## 2019-03-20 PROCEDURE — 93306 TTE W/DOPPLER COMPLETE: CPT | Performed by: INTERNAL MEDICINE

## 2019-03-20 PROCEDURE — 36415 COLL VENOUS BLD VENIPUNCTURE: CPT

## 2019-03-20 PROCEDURE — 93306 TTE W/DOPPLER COMPLETE: CPT

## 2019-03-20 PROCEDURE — 82607 VITAMIN B-12: CPT

## 2019-03-20 PROCEDURE — 71046 X-RAY EXAM CHEST 2 VIEWS: CPT

## 2019-03-20 PROCEDURE — 84436 ASSAY OF TOTAL THYROXINE: CPT

## 2019-03-20 NOTE — TELEPHONE ENCOUNTER
----- Message from Denise Conn MD sent at 3/20/2019  4:02 PM EDT -----  Can you please let patient know that her echocardiogram was normal   Thank you

## 2019-03-22 ENCOUNTER — TELEPHONE (OUTPATIENT)
Dept: FAMILY MEDICINE CLINIC | Facility: CLINIC | Age: 34
End: 2019-03-22

## 2019-03-22 NOTE — TELEPHONE ENCOUNTER
----- Message from Alveta Hammans, MD sent at 3/22/2019  4:20 PM EDT -----  Can you please let patient know that her total T4 is now back to normal range  Her vitamin B12 is also normal   I would encourage increasing B12 rich foods and/or taking a supplement of B12 of 500 mcg Monday/Wednesday/Friday to ensure she is getting enough vitamin B12    Thank you

## 2019-03-22 NOTE — RESULT ENCOUNTER NOTE
Can you please let patient know that her pulmonary function test was overall normal   According to the pulmonologist interpreting the test, there was good response after the use of the nebulizer  There was no component of asthma however noted    If she does continue to have chest tightness, she may benefit from evaluation by a pulmonologist   Thank you

## 2019-03-22 NOTE — RESULT ENCOUNTER NOTE
Can you please let patient know that her total T4 is now back to normal range  Her vitamin B12 is also normal   I would encourage increasing B12 rich foods and/or taking a supplement of B12 of 500 mcg Monday/Wednesday/Friday to ensure she is getting enough vitamin B12    Thank you

## 2019-03-26 ENCOUNTER — TELEPHONE (OUTPATIENT)
Dept: FAMILY MEDICINE CLINIC | Facility: CLINIC | Age: 34
End: 2019-03-26

## 2019-03-26 NOTE — RESULT ENCOUNTER NOTE
Can you please let patient know that her chest x-ray did not show any acute abnormalities    Thank you

## 2019-03-26 NOTE — TELEPHONE ENCOUNTER
----- Message from Nya Kahn MD sent at 3/26/2019  6:58 PM EDT -----  Can you please let patient know that her chest x-ray did not show any acute abnormalities    Thank you

## 2019-03-28 NOTE — PROGRESS NOTES
DEPARTMENT OF NEUROLOGICAL SCIENCES  [address] and [facility]        NEW PATIENT EVALUATION NOTE    Patient: Alex Garcia  Medical Record Number: # [id number]  YOB: 1985  Date of visit: 3/29/2019    Referring provider: Pradeep Sotelo MD    ASSESSMENT     Diagnoses for this encounter:  1  Memory changes  Ambulatory referral to Neurology    Ambulatory referral to Speech Therapy    Comprehensive metabolic panel   2  Word finding difficulty       Impression of this 35 yo lady with significant pmhx of depression, substance abuse in past, here for long standing history of mainly word finding difficulty that worsens with stress  Uncertain exact date of onset  She is otherwise able to function well at home and her symptoms worsen with stress  Examination today was normal MOCA 30/30 with 5/5 delayed recall and non-focal physical exam except for the baseline bilateral foot drop from old lumbar spinal injury  No indication of a neurodegenerative process and part of her symptoms may be secondary to her emotional health and we discussed the findings in detail together  Previous labs in 2018 have been unremarkable  Proceed as below  PLAN     · Recent T4, CBC, vitamin B12 normal  But no other relevant labs in last three months  No new labs indicated at this time as historically CMP has been normal        · Reviewed previous relevant neuroimaging of the T and L spine MRIs  No imaging of the head  Non-focal examination today; can obtain CT head if her symptoms acutely changes   · Referral to Speech Pathology for cognitive rehabilitation therapy  · She will follow up with Sleep Medicine as previously arranged  · Thank you very much for sending me this interesting patient  · The patient has been instructed to call us about any new neurological problems or medication side effects  · Return to Clinic on as needed basis       A total of 60 minutes were spent face-to-face with this patient, of which 25% was spent on counseling and coordination of care  We discussed the natural history of the patient's condition, differential diagnosis, level of diagnostic certainty, treatment alternatives and their side effects and possible complications  HISTORY OF PRESENT ILLNESS:     Ms Liz Felix is a 34 y o  left handed female status post L1 corpectomy and fusion from T12 through L2, who has been referred to the [facility] for evaluation of memory issues  The patient was accompanied today  History was obtained from patient alone  Main bothersome neurological symptoms today are:   1  Word finding difficulty  She tells me that as far back as she can remember even as a child she has issues with word finding difficulty  She was diagnosed with a speech impediment with mispronounication of certain sounds  She was homeschooled  She endorses she was in a "deep depression" for 15 years and during that time she was not as conversant with people and the full extent of her deficits were not known  Now more recently in the last two years she has been more outgoing and more conversant and has thus noticed the word finding difficulty more prominently  She would find the flow of conversation disrupted often, occurring several times a day  The recalls the word later on  She has social anxiety that is exacerbated by worry over possibly failure to communicate  She feels embarassed when she has to speak to people as a result and notices she does not retain information very well  She denies issues with driving, personal hygiene or major safety issues at home  She currently lives with her mother  Pt does snore and has appt with Sleep specialist  Eliseo Kumar says has been under stresses recently with her boyfriend moving across the country for work       Living Situation + ADLs:  She took some college courses on interior design, but did not work - she lived with her mother and still does  She is not on disability  Her depression had been blocking her from seeking meaningful jobs  She is able to handle all daily tasks  REVIEW OF PAST MEDICAL, SOCIAL AND FAMILY HISTORY:  This is the list of problems as per our Medical Records:    Patient Active Problem List    Diagnosis Date Noted    Chest tightness 03/05/2019    Pounding heartbeat 03/05/2019    Night sweats 03/05/2019    Snoring 03/05/2019    Memory changes 03/05/2019    Urinary urgency 11/04/2018    Left flank pain 06/25/2018    Abnormal urinalysis 06/25/2018    Lumbar radiculopathy 06/05/2018    Myofascial pain 06/05/2018    Chronic midline thoracic back pain 04/04/2018    Low vitamin B12 level 04/04/2018    Vegetarian diet 04/04/2018    Injury to L1 level of spinal cord ([facility] [address] ) 04/04/2018    Bipolar affective disorder ([facility] [address] ) 04/04/2018    Paronychia of toe of right foot 02/22/2018    Constipation 02/22/2018    Weight loss 02/22/2018    H/O spinal cord injury 02/22/2018       Past Medical History:   Diagnosis Date    Anemia     Anxiety     Depression     Foot drop     History of spinal cord injury     at L1    Mental disorder     Small bowel problem         Past Surgical History:   Procedure Laterality Date    CYSTOSCOPY      Diagnostic; onset: 1/3/17    FL MYELOGRAM 2 OR MORE REGIONS  9/6/2018    DE COLONOSCOPY FLX DX W/COLLJ SPEC WHEN PFRMD N/A 12/12/2017    Procedure: COLONOSCOPY;  Surgeon: Jessi Regan MD;  [facility];   Service: Gastroenterology    SPINAL FUSION          Allergies   Allergen Reactions    Sulfamethoxazole Nausea Only and GI Intolerance        Outpatient Encounter Medications as of 3/29/2019   Medication Sig Dispense Refill    busPIRone (BUSPAR) 10 mg tablet Take 1 tablet by mouth 3 (three) times a day      carBAMazepine (TEGretol) 200 mg tablet Take 200 mg by mouth 2 (two) times a day        hydrOXYzine HCL (ATARAX) 10 mg tablet hydroxyzine HCl 10 mg tablet      levonorgestrel (MIRENA) 20 MCG/24HR IUD 20 mcg/day      Mirabegron ER (MYRBETRIQ) 50 MG TB24 Take 1 tablet (50 mg total) by mouth daily 30 tablet 11    oxybutynin (DITROPAN-XL) 5 mg 24 hr tablet Take 1 tablet (5 mg total) by mouth daily 30 tablet 11    sertraline (ZOLOFT) 100 mg tablet Take 100 mg by mouth every morning  0    tiZANidine (ZANAFLEX) 2 mg tablet Take 1 tablet (2 mg total) by mouth every 8 (eight) hours as needed for muscle spasms 90 tablet 2     No facility-administered encounter medications on file as of 3/29/2019  Social History     Tobacco Use    Smoking status: Former Smoker     Packs/day: 1 00     Types: Cigarettes     Last attempt to quit: 2016     Years since quittin 2    Smokeless tobacco: Never Used    Tobacco comment: current every day smoker, former smoker and smokes less than 1 ppd as per [employer]   Substance Use Topics    Alcohol use: Yes     Frequency: Monthly or less     Comment: 2 wkly of any of the above  Formerly had 12 drinks of liquor a week before   Family History   Problem Relation Age of Onset    Diabetes Father     Lymphoma Father         Waldenstrom's    Heart disease Father         cardiac disorder    Hypertension Father     Pulmonary embolism Father     Hyperlipidemia Father     Stroke Family     Depression Mother     No Known Problems Sister     No Known Problems Brother         REVIEW OF SYSTEMS:  The patient has entered data on an intake form regarding present illness, past medical and surgical history, medications, allergies, family and social history, and a full review of 14 systems  I have reviewed this form with the patient, and all the relevant information has been included on this note  The full review of systems was negative except as stated in HPI and below  Constitutional: Negative  Negative for appetite change and fever  HENT: Negative    Negative for hearing loss, tinnitus, trouble swallowing and voice change  Eyes: Negative  Negative for photophobia and pain  Respiratory: Negative  Negative for shortness of breath  Cardiovascular: Negative  Negative for palpitations  Gastrointestinal: Negative  Negative for nausea  Endocrine: Negative  Negative for cold intolerance and heat intolerance  Genitourinary: Negative  Negative for dysuria, frequency and urgency  Musculoskeletal: Negative  Negative for myalgias and neck pain  Skin: Negative  Allergic/Immunologic: Negative  Neurological: Negative  Negative for dizziness, tremors, seizures, syncope, facial asymmetry, speech difficulty, weakness and numbness  Positive for forgetfulness     Hematological: Negative  Does not bruise/bleed easily  Psychiatric/Behavioral: Negative for confusion and hallucinations  The patient is nervous/anxious  PHYSICAL EXAMINATION:     Vital signs:  /70 (BP Location: Left arm, Patient Position: Sitting, Cuff Size: Standard)   Pulse 70   Ht 5' 4" (1 626 m)   Wt 54 9 kg (121 lb)   BMI 20 77 kg/m²     General:  Cane by her side  Well-appearing, well nourished, pleasant patient in no acute distress  Mood and Fund of Knowledge are appropriate  Head:  Normocephalic, atraumatic  Oropharynx and conjunctiva are clear  Speech  No hypophonia, no bradylalia  No scanning speech  Language: Comprehension intact  Neck:  Supple, strong 5/5 forward flexion and retroflexion     Extremities: Range of motion is normal       Cognitive and Mental Exam:  MOCA    Points MAX   Visuospatial  ----------   Trails A 1 1   Cube Drawing 1 1   Clock Drawing 3 3   Naming Objects 3 3   Attention  ----------   Digit Span 2 2   Letter Reading 1 1   Serial 7s 3 3   Language  ----------   Repetition 2 2   Fluency 1 1   Abstraction 2 2   Delayed Recall 5 5   Orientation               6               6              30 30   +0 for college classes = 30    Cranial Nerves:  CN II:  Direct and consensual light reflexes were equally reactive to light symmetrically  No afferent pupillary defect   Visual fields are full to confrontation  CN III / IV / VI: Extraocular movements were full, with normal pursuit and saccades  CN V:   Facial sensation to light touch was intact  CN VII: Face is symmetric with normal strength  CN VIII: Hearing was assessed using the Calibrated Finger Rub Auditory Screening Test (CALFRAST) and was not abnormal (Better than CALFRAST-Strong-70)  CN X:   Palate is up going bilaterally and symmetrically  CN XI:  Neck muscles are strong  CN XII: Tongue protrusion is at midline with normal movements  No dysarthria  Motor:    Dystonia: none  Dyskinesia: none  Myoclonus: none  Chorea: none  Tics: none      MDS-UPDRS III:   Speech: 0  Facial Expression: 0  Tremor (Head):  0  Rest Tremor Severity (RUE/LUE/RLE/LLE/Lip): 0/0/0/0/0  Action Tremor of Hands (R): 0  Action Tremor of Hands (L): 0  Finger tapping (R): 0  Finger tapping (L): 0  Hand clenching (R): 0  Hand clenching (L): 0  MITESH Hand (R): 0  MITESH Hand (L): 0  Toe Tapping (R):  - unable to perform due to drop foot  Toe Tapping (L):  - unable to perform due to drop foot  Leg Agility (R):  0  Leg Agility (L):  0  Rigidity (Neck): 0  Rigidity (RUE): 0  Rigidity (LUE): 0  Rigidity (RLE): 0  Rigidity (LLE): 0  Arising From Chair: 1 with cane due to ongoing foot drop  Posture: 0  Gait: 1  Freezing of Gait: 0  Postural Stability: -  Body Bradykinesia: 0  -------------------------------------------------------------------------------------    Muscle Strength Right Left  Muscle Strength Right Left   Deltoid 5/5 5/5  Hip Adductors 5/5 5/5   Biceps 5/5 5/5  Hip Abductors 5/5 5/5   Triceps 5/5 5/5  Knee Extensors 5/5 5/5   Wrist Extensors 5/5 5/5  Knee Flexors 5/5 5/5   Wrist Flexors 5/5 5/5  Ankle Extensors 2/5 4/5    5/5 5/5  Ankle Flexors 2/5 2/5   Finger Abductors 5/5 5/5       Hip Flexors 5/5 5/5   Hip Extensors 5/5 5/5     Sensory  Intact to Light Touch, Temperature, and vibration sense in all extremities EXCEPT for reduced pinprick over the sole of the foot and the toes bilaterally  Coordination:  Finger-to-nose-finger: normal     Gait:  Rises slowly from chair, uses cane in R hand, Unable to stand on heels or tiptoes due to old nerve injury  She has bilateral leg braces on for the foot drop  Reflexes:    Right Left   Biceps 2/4 2/4   Brachioradialis 2/4 2/4   Triceps 2/4 2/4   Knee 2/4 2/4   Ankle 2/4 2/4      Plantar cutaneous reflex:  Right: mute  Left: mute      REVIEW OF ANCILLARY TESTS:   No results found for this or any previous visit

## 2019-03-29 ENCOUNTER — CONSULT (OUTPATIENT)
Dept: NEUROLOGY | Facility: CLINIC | Age: 34
End: 2019-03-29
Payer: COMMERCIAL

## 2019-03-29 VITALS
HEART RATE: 70 BPM | DIASTOLIC BLOOD PRESSURE: 70 MMHG | BODY MASS INDEX: 20.66 KG/M2 | HEIGHT: 64 IN | SYSTOLIC BLOOD PRESSURE: 122 MMHG | WEIGHT: 121 LBS

## 2019-03-29 DIAGNOSIS — R47.89 WORD FINDING DIFFICULTY: ICD-10-CM

## 2019-03-29 DIAGNOSIS — R41.3 MEMORY CHANGES: Primary | ICD-10-CM

## 2019-03-29 PROCEDURE — 99244 OFF/OP CNSLTJ NEW/EST MOD 40: CPT | Performed by: PSYCHIATRY & NEUROLOGY

## 2019-03-29 NOTE — PATIENT INSTRUCTIONS
· Recent T4, CBC, vitamin B12 normal  But no other relevant labs in last three months  No new labs indicated at this time  · Reviewed previous relevant neuroimaging of the T and L spine MRIs  No imaging of the head  Non-focal examination today; can obtain CT head if her symptoms acutely changes   · Referral to Speech Pathology for cognitive rehabilitation therapy  · Thank you very much for sending me this interesting patient  · The patient has been instructed to call us about any new neurological problems or medication side effects  · Return to Clinic on as needed basis

## 2019-03-29 NOTE — LETTER
March 29, 2019     [address] [place] [address]    Patient: Greg Loaiza   YOB: 1985   Date of Visit: 3/29/2019       Dear Dr Terrence Kelly:    Thank you for referring Greg Loaiza to me for evaluation  Below are my notes for this consultation  If you have questions, please do not hesitate to call me  I look forward to following your patient along with you  Sincerely,        Yu Banks MD        CC: No Recipients  Wade Rebollar  3/29/2019 10:39 AM  Sign at close encounter  Review of Systems   Constitutional: Negative  Negative for appetite change and fever  HENT: Negative  Negative for hearing loss, tinnitus, trouble swallowing and voice change  Eyes: Negative  Negative for photophobia and pain  Respiratory: Negative  Negative for shortness of breath  Cardiovascular: Negative  Negative for palpitations  Gastrointestinal: Negative  Negative for nausea  Endocrine: Negative  Negative for cold intolerance and heat intolerance  Genitourinary: Negative  Negative for dysuria, frequency and urgency  Musculoskeletal: Negative  Negative for myalgias and neck pain  Skin: Negative  Allergic/Immunologic: Negative  Neurological: Negative  Negative for dizziness, tremors, seizures, syncope, facial asymmetry, speech difficulty, weakness and numbness  Positive for forgetfulness     Hematological: Negative  Does not bruise/bleed easily  Psychiatric/Behavioral: Negative for confusion and hallucinations  The patient is nervous/anxious          Yu Banks MD  3/29/2019  1:13 PM  Sign at close encounter  [facility]        NEW PATIENT EVALUATION NOTE    Patient: Greg Loaiza  Medical Record Number: # [id number]  YOB: 1985  Date of visit: 3/29/2019    Referring provider: Avelino Mckenzie MD    ASSESSMENT     Diagnoses for this encounter:  1  Memory changes  Ambulatory referral to Neurology    Ambulatory referral to Speech Therapy    Comprehensive metabolic panel   2  Word finding difficulty       Impression of this 35 yo lady with significant pmhx of depression, substance abuse in past, here for long standing history of mainly word finding difficulty that worsens with stress  Uncertain exact date of onset  She is otherwise able to function well at home and her symptoms worsen with stress  Examination today was normal MOCA 30/30 with 5/5 delayed recall and non-focal physical exam except for the baseline bilateral foot drop from old lumbar spinal injury  No indication of a neurodegenerative process and part of her symptoms may be secondary to her emotional health and we discussed the findings in detail together  Previous labs in 2018 have been unremarkable  Proceed as below  PLAN     · Recent T4, CBC, vitamin B12 normal  But no other relevant labs in last three months  No new labs indicated at this time as historically CMP has been normal        · Reviewed previous relevant neuroimaging of the T and L spine MRIs  No imaging of the head  Non-focal examination today; can obtain CT head if her symptoms acutely changes   · Referral to Speech Pathology for cognitive rehabilitation therapy  · She will follow up with Sleep Medicine as previously arranged  · Thank you very much for sending me this interesting patient  · The patient has been instructed to call us about any new neurological problems or medication side effects  · Return to Clinic on as needed basis  A total of 60 minutes were spent face-to-face with this patient, of which 25% was spent on counseling and coordination of care  We discussed the natural history of the patient's condition, differential diagnosis, level of diagnostic certainty, treatment alternatives and their side effects and possible complications       HISTORY OF PRESENT ILLNESS:     Ms Fern Fernando is a 34 y o  left handed female status post L1 corpectomy and fusion from T12 through L2, who has been referred to the [facility]  for evaluation of memory issues  The patient was accompanied today  History was obtained from patient alone  Main bothersome neurological symptoms today are:   1  Word finding difficulty  She tells me that as far back as she can remember even as a child she has issues with word finding difficulty  She was diagnosed with a speech impediment with mispronounication of certain sounds  She was homeschooled  She endorses she was in a "deep depression" for 15 years and during that time she was not as conversant with people and the full extent of her deficits were not known  Now more recently in the last two years she has been more outgoing and more conversant and has thus noticed the word finding difficulty more prominently  She would find the flow of conversation disrupted often, occurring several times a day  The recalls the word later on  She has social anxiety that is exacerbated by worry over possibly failure to communicate  She feels embarassed when she has to speak to people as a result and notices she does not retain information very well  She denies issues with driving, personal hygiene or major safety issues at home  She currently lives with her mother  Pt does snore and has appt with Sleep specialist  Ayalachantel Martinez says has been under stresses recently with her boyfriend moving across the country for work  Living Situation + ADLs:  She took some college courses on interior design, but did not work - she lived with her mother and still does  She is not on disability  Her depression had been blocking her from seeking meaningful jobs  She is able to handle all daily tasks        REVIEW OF PAST MEDICAL, SOCIAL AND FAMILY HISTORY:  This is the list of problems as per our Medical Records:    Patient Active Problem List    Diagnosis Date Noted    Chest tightness 03/05/2019    Pounding heartbeat 03/05/2019    Night sweats 03/05/2019    Snoring 03/05/2019    Memory changes 03/05/2019    Urinary urgency 11/04/2018    Left flank pain 06/25/2018    Abnormal urinalysis 06/25/2018    Lumbar radiculopathy 06/05/2018    Myofascial pain 06/05/2018    Chronic midline thoracic back pain 04/04/2018    Low vitamin B12 level 04/04/2018    Vegetarian diet 04/04/2018    Injury to L1 level of spinal cord ([facility] ) 04/04/2018    Bipolar affective disorder ([facility] ) 04/04/2018    Paronychia of toe of right foot 02/22/2018    Constipation 02/22/2018    Weight loss 02/22/2018    H/O spinal cord injury 02/22/2018       Past Medical History:   Diagnosis Date    Anemia     Anxiety     Depression     Foot drop     History of spinal cord injury     at L1    Mental disorder     Small bowel problem         Past Surgical History:   Procedure Laterality Date    CYSTOSCOPY      Diagnostic; onset: 1/3/17    FL MYELOGRAM 2 OR MORE REGIONS  9/6/2018    DC COLONOSCOPY FLX DX W/COLLJ SPEC WHEN PFRMD N/A 12/12/2017    Procedure: COLONOSCOPY;  Surgeon: Cherelle Cruz MD;  [facility];   Service: Gastroenterology    SPINAL FUSION          Allergies   Allergen Reactions    Sulfamethoxazole Nausea Only and GI Intolerance        Outpatient Encounter Medications as of 3/29/2019   Medication Sig Dispense Refill    busPIRone (BUSPAR) 10 mg tablet Take 1 tablet by mouth 3 (three) times a day      carBAMazepine (TEGretol) 200 mg tablet Take 200 mg by mouth 2 (two) times a day        hydrOXYzine HCL (ATARAX) 10 mg tablet hydroxyzine HCl 10 mg tablet      levonorgestrel (MIRENA) 20 MCG/24HR IUD 20 mcg/day      Mirabegron ER (MYRBETRIQ) 50 MG TB24 Take 1 tablet (50 mg total) by mouth daily 30 tablet 11    oxybutynin (DITROPAN-XL) 5 mg 24 hr tablet Take 1 tablet (5 mg total) by mouth daily 30 tablet 11    sertraline (ZOLOFT) 100 mg tablet Take 100 mg by mouth every morning  0    tiZANidine (ZANAFLEX) 2 mg tablet Take 1 tablet (2 mg total) by mouth every 8 (eight) hours as needed for muscle spasms 90 tablet 2     No facility-administered encounter medications on file as of 3/29/2019  Social History     Tobacco Use    Smoking status: Former Smoker     Packs/day: 1 00     Types: Cigarettes     Last attempt to quit: 2016     Years since quittin 2    Smokeless tobacco: Never Used    Tobacco comment: current every day smoker, former smoker and smokes less than 1 ppd as per [employer]   Substance Use Topics    Alcohol use: Yes     Frequency: Monthly or less     Comment: 2 wkly of any of the above  Formerly had 12 drinks of liquor a week before   Family History   Problem Relation Age of Onset    Diabetes Father     Lymphoma Father         Waldenstrom's    Heart disease Father         cardiac disorder    Hypertension Father     Pulmonary embolism Father     Hyperlipidemia Father     Stroke Family     Depression Mother     No Known Problems Sister     No Known Problems Brother         REVIEW OF SYSTEMS:  The patient has entered data on an intake form regarding present illness, past medical and surgical history, medications, allergies, family and social history, and a full review of 14 systems  I have reviewed this form with the patient, and all the relevant information has been included on this note  The full review of systems was negative except as stated in HPI and below  Constitutional: Negative  Negative for appetite change and fever  HENT: Negative  Negative for hearing loss, tinnitus, trouble swallowing and voice change  Eyes: Negative  Negative for photophobia and pain  Respiratory: Negative  Negative for shortness of breath  Cardiovascular: Negative  Negative for palpitations  Gastrointestinal: Negative  Negative for nausea  Endocrine: Negative  Negative for cold intolerance and heat intolerance  Genitourinary: Negative  Negative for dysuria, frequency and urgency  Musculoskeletal: Negative  Negative for myalgias and neck pain  Skin: Negative  Allergic/Immunologic: Negative  Neurological: Negative  Negative for dizziness, tremors, seizures, syncope, facial asymmetry, speech difficulty, weakness and numbness  Positive for forgetfulness     Hematological: Negative  Does not bruise/bleed easily  Psychiatric/Behavioral: Negative for confusion and hallucinations  The patient is nervous/anxious  PHYSICAL EXAMINATION:     Vital signs:  /70 (BP Location: Left arm, Patient Position: Sitting, Cuff Size: Standard)   Pulse 70   Ht 5' 4" (1 626 m)   Wt 54 9 kg (121 lb)   BMI 20 77 kg/m²      General:  Cane by her side  Well-appearing, well nourished, pleasant patient in no acute distress  Mood and Fund of Knowledge are appropriate  Head:  Normocephalic, atraumatic  Oropharynx and conjunctiva are clear  Speech  No hypophonia, no bradylalia  No scanning speech  Language: Comprehension intact  Neck:  Supple, strong 5/5 forward flexion and retroflexion  Extremities: Range of motion is normal       Cognitive and Mental Exam:  MOCA    Points MAX   Visuospatial  ----------   Trails A 1 1   Cube Drawing 1 1   Clock Drawing 3 3   Naming Objects 3 3   Attention  ----------   Digit Span 2 2   Letter Reading 1 1   Serial 7s 3 3   Language  ----------   Repetition 2 2   Fluency 1 1   Abstraction 2 2   Delayed Recall 5 5   Orientation               6               6              30 30   +0 for college classes = 30    Cranial Nerves:  CN II:  Direct and consensual light reflexes were equally reactive to light symmetrically  No afferent pupillary defect   Visual fields are full to confrontation  CN III / IV / VI: Extraocular movements were full, with normal pursuit and saccades  CN V:   Facial sensation to light touch was intact  CN VII: Face is symmetric with normal strength     CN VIII: Hearing was assessed using the Calibrated Finger Rub Auditory Screening Test (CALFRAST) and was not abnormal (Better than CALFRAST-Strong-70)  CN X:   Palate is up going bilaterally and symmetrically  CN XI:  Neck muscles are strong  CN XII: Tongue protrusion is at midline with normal movements  No dysarthria  Motor:    Dystonia: none  Dyskinesia: none  Myoclonus: none  Chorea: none  Tics: none  MDS-UPDRS III:   Speech: 0  Facial Expression: 0  Tremor (Head):  0  Rest Tremor Severity (RUE/LUE/RLE/LLE/Lip): 0/0/0/0/0  Action Tremor of Hands (R): 0  Action Tremor of Hands (L): 0  Finger tapping (R): 0  Finger tapping (L): 0  Hand clenching (R): 0  Hand clenching (L): 0  MITESH Hand (R): 0  MITESH Hand (L): 0  Toe Tapping (R):   - unable to perform due to drop foot  Toe Tapping (L):   - unable to perform due to drop foot  Leg Agility (R):   0  Leg Agility (L):   0  Rigidity (Neck): 0  Rigidity (RUE): 0  Rigidity (LUE): 0  Rigidity (RLE): 0  Rigidity (LLE): 0  Arising From Chair: 1 with cane due to ongoing foot drop  Posture: 0  Gait: 1  Freezing of Gait: 0  Postural Stability: -  Body Bradykinesia: 0  -------------------------------------------------------------------------------------    Muscle Strength Right Left  Muscle Strength Right Left   Deltoid 5/5 5/5  Hip Adductors 5/5 5/5   Biceps 5/5 5/5  Hip Abductors 5/5 5/5   Triceps 5/5 5/5  Knee Extensors 5/5 5/5   Wrist Extensors 5/5 5/5  Knee Flexors 5/5 5/5   Wrist Flexors 5/5 5/5  Ankle Extensors 2/5 4/5    5/5 5/5  Ankle Flexors 2/5 2/5   Finger Abductors 5/5 5/5       Hip Flexors 5/5 5/5   Hip Extensors 5/5 5/5     Sensory  Intact to Light Touch, Temperature, and vibration sense in all extremities EXCEPT for reduced pinprick over the sole of the foot and the toes bilaterally  Coordination:  Finger-to-nose-finger: normal     Gait:  Rises slowly from chair, uses cane in R hand, Unable to stand on heels or tiptoes due to old nerve injury   She has bilateral leg braces on for the foot drop  Reflexes:    Right Left   Biceps 2/4 2/4   Brachioradialis 2/4 2/4   Triceps 2/4 2/4   Knee 2/4 2/4   Ankle 2/4 2/4      Plantar cutaneous reflex:  Right: mute  Left: mute      REVIEW OF ANCILLARY TESTS:   No results found for this or any previous visit

## 2019-03-29 NOTE — PROGRESS NOTES
Review of Systems   Constitutional: Negative  Negative for appetite change and fever  HENT: Negative  Negative for hearing loss, tinnitus, trouble swallowing and voice change  Eyes: Negative  Negative for photophobia and pain  Respiratory: Negative  Negative for shortness of breath  Cardiovascular: Negative  Negative for palpitations  Gastrointestinal: Negative  Negative for nausea  Endocrine: Negative  Negative for cold intolerance and heat intolerance  Genitourinary: Negative  Negative for dysuria, frequency and urgency  Musculoskeletal: Negative  Negative for myalgias and neck pain  Skin: Negative  Allergic/Immunologic: Negative  Neurological: Negative  Negative for dizziness, tremors, seizures, syncope, facial asymmetry, speech difficulty, weakness and numbness  Positive for forgetfulness     Hematological: Negative  Does not bruise/bleed easily  Psychiatric/Behavioral: Negative for confusion and hallucinations  The patient is nervous/anxious

## 2019-04-08 DIAGNOSIS — M79.18 MYOFASCIAL PAIN: ICD-10-CM

## 2019-04-09 RX ORDER — TIZANIDINE 2 MG/1
2 TABLET ORAL EVERY 8 HOURS PRN
Qty: 90 TABLET | Refills: 2 | OUTPATIENT
Start: 2019-04-09

## 2019-04-22 ENCOUNTER — OFFICE VISIT (OUTPATIENT)
Dept: UROLOGY | Facility: AMBULATORY SURGERY CENTER | Age: 34
End: 2019-04-22
Payer: COMMERCIAL

## 2019-04-22 VITALS
WEIGHT: 124.2 LBS | HEART RATE: 80 BPM | BODY MASS INDEX: 21.21 KG/M2 | SYSTOLIC BLOOD PRESSURE: 108 MMHG | HEIGHT: 64 IN | DIASTOLIC BLOOD PRESSURE: 68 MMHG

## 2019-04-22 DIAGNOSIS — N39.41 URGE INCONTINENCE: Primary | ICD-10-CM

## 2019-04-22 PROCEDURE — 99214 OFFICE O/P EST MOD 30 MIN: CPT | Performed by: UROLOGY

## 2019-04-22 RX ORDER — OXYBUTYNIN CHLORIDE 15 MG/1
15 TABLET, EXTENDED RELEASE ORAL DAILY
Qty: 30 TABLET | Refills: 1 | Status: SHIPPED | OUTPATIENT
Start: 2019-04-22 | End: 2019-08-14 | Stop reason: SDUPTHER

## 2019-04-22 RX ORDER — CEFAZOLIN SODIUM 1 G/50ML
1000 SOLUTION INTRAVENOUS ONCE
Status: CANCELLED | OUTPATIENT
Start: 2019-04-22 | End: 2019-04-22

## 2019-04-23 ENCOUNTER — PROCEDURE VISIT (OUTPATIENT)
Dept: UROLOGY | Facility: AMBULATORY SURGERY CENTER | Age: 34
End: 2019-04-23
Payer: COMMERCIAL

## 2019-04-23 VITALS
HEIGHT: 64 IN | SYSTOLIC BLOOD PRESSURE: 108 MMHG | HEART RATE: 80 BPM | BODY MASS INDEX: 21.17 KG/M2 | WEIGHT: 124 LBS | DIASTOLIC BLOOD PRESSURE: 68 MMHG

## 2019-04-23 DIAGNOSIS — N39.41 URGE INCONTINENCE: Primary | ICD-10-CM

## 2019-04-23 PROCEDURE — 99211 OFF/OP EST MAY X REQ PHY/QHP: CPT | Performed by: UROLOGY

## 2019-04-23 PROCEDURE — 87086 URINE CULTURE/COLONY COUNT: CPT | Performed by: NURSE PRACTITIONER

## 2019-04-24 LAB — BACTERIA UR CULT: NORMAL

## 2019-04-25 ENCOUNTER — TELEPHONE (OUTPATIENT)
Dept: UROLOGY | Facility: AMBULATORY SURGERY CENTER | Age: 34
End: 2019-04-25

## 2019-04-25 ENCOUNTER — OFFICE VISIT (OUTPATIENT)
Dept: SLEEP CENTER | Facility: CLINIC | Age: 34
End: 2019-04-25
Payer: COMMERCIAL

## 2019-04-25 VITALS
BODY MASS INDEX: 20.83 KG/M2 | WEIGHT: 122 LBS | DIASTOLIC BLOOD PRESSURE: 68 MMHG | SYSTOLIC BLOOD PRESSURE: 112 MMHG | HEIGHT: 64 IN | HEART RATE: 76 BPM

## 2019-04-25 DIAGNOSIS — R06.83 SNORING: ICD-10-CM

## 2019-04-25 DIAGNOSIS — G47.10 HYPERSOMNIA: Primary | ICD-10-CM

## 2019-04-25 PROCEDURE — 99243 OFF/OP CNSLTJ NEW/EST LOW 30: CPT | Performed by: INTERNAL MEDICINE

## 2019-04-29 ENCOUNTER — EVALUATION (OUTPATIENT)
Dept: SPEECH THERAPY | Facility: CLINIC | Age: 34
End: 2019-04-29
Payer: COMMERCIAL

## 2019-04-29 DIAGNOSIS — R48.8 OTHER SYMBOLIC DYSFUNCTIONS: Primary | ICD-10-CM

## 2019-04-29 DIAGNOSIS — R41.3 MEMORY CHANGES: ICD-10-CM

## 2019-04-29 PROCEDURE — 96125 COGNITIVE TEST BY HC PRO: CPT

## 2019-05-01 ENCOUNTER — OFFICE VISIT (OUTPATIENT)
Dept: SPEECH THERAPY | Facility: CLINIC | Age: 34
End: 2019-05-01
Payer: COMMERCIAL

## 2019-05-01 DIAGNOSIS — R48.8 OTHER SYMBOLIC DYSFUNCTIONS: Primary | ICD-10-CM

## 2019-05-01 DIAGNOSIS — R41.3 MEMORY CHANGES: ICD-10-CM

## 2019-05-01 PROCEDURE — 92507 TX SP LANG VOICE COMM INDIV: CPT

## 2019-05-06 ENCOUNTER — APPOINTMENT (OUTPATIENT)
Dept: SPEECH THERAPY | Facility: CLINIC | Age: 34
End: 2019-05-06
Payer: COMMERCIAL

## 2019-05-08 ENCOUNTER — ANESTHESIA EVENT (OUTPATIENT)
Dept: SURGERY | Facility: AMBULARY SURGERY CENTER | Age: 34
End: 2019-05-08

## 2019-05-14 NOTE — PROGRESS NOTES
Patient d/c to hep  Please refer to PT reassessment dated 2-22-19 for patient status at most recent comprehensive assessment prior to d/c to hep

## 2019-05-17 ENCOUNTER — TELEPHONE (OUTPATIENT)
Dept: UROLOGY | Facility: CLINIC | Age: 34
End: 2019-05-17

## 2019-05-17 ENCOUNTER — OFFICE VISIT (OUTPATIENT)
Dept: SPEECH THERAPY | Facility: CLINIC | Age: 34
End: 2019-05-17
Payer: COMMERCIAL

## 2019-05-17 DIAGNOSIS — R41.3 MEMORY CHANGES: ICD-10-CM

## 2019-05-17 DIAGNOSIS — R48.8 OTHER SYMBOLIC DYSFUNCTIONS: Primary | ICD-10-CM

## 2019-05-17 PROCEDURE — 92507 TX SP LANG VOICE COMM INDIV: CPT

## 2019-05-21 ENCOUNTER — APPOINTMENT (OUTPATIENT)
Dept: SPEECH THERAPY | Facility: CLINIC | Age: 34
End: 2019-05-21
Payer: COMMERCIAL

## 2019-05-22 ENCOUNTER — ANESTHESIA (OUTPATIENT)
Dept: SURGERY | Facility: AMBULARY SURGERY CENTER | Age: 34
End: 2019-05-22

## 2019-05-24 ENCOUNTER — OFFICE VISIT (OUTPATIENT)
Dept: SPEECH THERAPY | Facility: CLINIC | Age: 34
End: 2019-05-24
Payer: COMMERCIAL

## 2019-05-24 DIAGNOSIS — N32.81 OAB (OVERACTIVE BLADDER): ICD-10-CM

## 2019-05-24 DIAGNOSIS — R48.8 OTHER SYMBOLIC DYSFUNCTIONS: Primary | ICD-10-CM

## 2019-05-24 DIAGNOSIS — R41.3 MEMORY CHANGES: ICD-10-CM

## 2019-05-24 PROCEDURE — 92507 TX SP LANG VOICE COMM INDIV: CPT | Performed by: SPEECH-LANGUAGE PATHOLOGIST

## 2019-05-24 RX ORDER — OXYBUTYNIN CHLORIDE 5 MG/1
TABLET, EXTENDED RELEASE ORAL
Qty: 30 TABLET | Refills: 11 | Status: SHIPPED | OUTPATIENT
Start: 2019-05-24 | End: 2019-07-22 | Stop reason: DRUGHIGH

## 2019-05-28 ENCOUNTER — OFFICE VISIT (OUTPATIENT)
Dept: SPEECH THERAPY | Facility: CLINIC | Age: 34
End: 2019-05-28
Payer: COMMERCIAL

## 2019-05-28 DIAGNOSIS — R41.3 MEMORY CHANGES: ICD-10-CM

## 2019-05-28 DIAGNOSIS — R48.8 OTHER SYMBOLIC DYSFUNCTIONS: Primary | ICD-10-CM

## 2019-05-28 PROCEDURE — 92507 TX SP LANG VOICE COMM INDIV: CPT

## 2019-05-31 ENCOUNTER — APPOINTMENT (OUTPATIENT)
Dept: SPEECH THERAPY | Facility: CLINIC | Age: 34
End: 2019-05-31
Payer: COMMERCIAL

## 2019-06-03 ENCOUNTER — TRANSCRIBE ORDERS (OUTPATIENT)
Dept: URGENT CARE | Age: 34
End: 2019-06-03

## 2019-06-03 ENCOUNTER — APPOINTMENT (OUTPATIENT)
Dept: RADIOLOGY | Age: 34
End: 2019-06-03
Payer: COMMERCIAL

## 2019-06-03 ENCOUNTER — OFFICE VISIT (OUTPATIENT)
Dept: URGENT CARE | Age: 34
End: 2019-06-03
Payer: COMMERCIAL

## 2019-06-03 VITALS
HEIGHT: 64 IN | BODY MASS INDEX: 20.49 KG/M2 | SYSTOLIC BLOOD PRESSURE: 125 MMHG | RESPIRATION RATE: 18 BRPM | OXYGEN SATURATION: 97 % | DIASTOLIC BLOOD PRESSURE: 76 MMHG | HEART RATE: 74 BPM | WEIGHT: 120 LBS | TEMPERATURE: 98.3 F

## 2019-06-03 DIAGNOSIS — M79.671 RIGHT FOOT PAIN: ICD-10-CM

## 2019-06-03 DIAGNOSIS — M25.571 ACUTE RIGHT ANKLE PAIN: Primary | ICD-10-CM

## 2019-06-03 PROCEDURE — 99283 EMERGENCY DEPT VISIT LOW MDM: CPT | Performed by: FAMILY MEDICINE

## 2019-06-03 PROCEDURE — 99203 OFFICE O/P NEW LOW 30 MIN: CPT | Performed by: FAMILY MEDICINE

## 2019-06-03 PROCEDURE — G0382 LEV 3 HOSP TYPE B ED VISIT: HCPCS | Performed by: FAMILY MEDICINE

## 2019-06-03 PROCEDURE — 73630 X-RAY EXAM OF FOOT: CPT

## 2019-06-03 PROCEDURE — 73610 X-RAY EXAM OF ANKLE: CPT

## 2019-06-04 ENCOUNTER — APPOINTMENT (OUTPATIENT)
Dept: SPEECH THERAPY | Facility: CLINIC | Age: 34
End: 2019-06-04
Payer: COMMERCIAL

## 2019-06-04 NOTE — PROGRESS NOTES
Speech-Language Pathology Re-Evaluation    Today's date: 2019  Patients name: Betty Carrasco  : 1985  MRN: [id number]  Safety measures: depression, anxiety  Referring provider: Walter Glass MD    Subjective comments: ***    Patient's goal(s): "to stop forgetting things"       Assessments    ***        Goals    1  Patient will be educated on the use of internal and external memory aids and compensatory strategies with 80% accuracy to facilitate increased recall of routine, personal information, and recent events, to be achieved in 4-6 weeks  2  Patient will complete complex auditory attention processing tasks (e g , sentence unscramble, ranking numbers/words, etc ) to improve working memory with 80% accuracy, to be achieved in 4-6 weeks  3  Patient will facilitate planning by completing thought organization tasks (e g , sequencing, deduction puzzles, etc ) with 80% accuracy to facilitate increased executive functioning, working memory, problem solving, and processing skills, to be achieved in 4-6 weeks  4  Patient will complete auditory immediate and short term memory tasks to 80% accuracy to facilitate increased ability to retell narratives and recall information within functional living environment, to be achieved in 4-6 weeks  5  Patient will be educated on word finding strategies (i e , circumlocution) for improved generative naming and verbal expression skills  6  Patient will complete word generation tasks (e g , analogies, category matrices, etc ) with 80% accuracy using word finding strategies to facilitate improved word retrieval skills, to be achieved in 4-6 weeks  7  Patient will demonstrate sustained attention by attending to one task for an extended amount of time >5min provided with min cues in a distracting environment with 80% accuracy, to be achieved in 4-6 weeks  Long Term Goals    1   Patient will demonstrate cognitive-communication skills consistent with age and education given use of compensatory strategies when needed to resume baseline activities and responsibilities in home, community, and work/school settings by discharge  2  Patient will complete cognitive-linguistic therapy that addresses patients specific deficits in processing speed, short-term working memory, attention to detail, monitoring, sequencing, and organization skills, with instruction, to alleviate effects of executive functioning disorder deficits by discharge  3  Patient will complete higher-level expressive language tasks (e g , word definitions, idioms, synonym/antonyms, etc) with 80% accuracy to improve functional communication skills by discharge  Impressions/Recommendations    Impressions: *** Patient presents with mild cognitive-linguistic deficits c/b decreased short term and immediate memory, attention, and higher level word finding in conversation  Although pt scored as "average" on today's cognitive standardized testing RBANS and language testing TOAL, pt's subjective reports of her daily functioning (e g  Forgetting to take medications, stumbling on words in conversation) warrants OP skilled ST at this time  Pt will be educated on external and internal memory compensatory strategies, word finding strategies (I e  Circumlocution), and provided with activities to improve her attention deficits  Pt appears to be a motivated candidate for ST  It is recommended she begin OP skilled ST 1x a week  Recommendations:  -Patient would benefit from outpatient skilled Speech Therapy services : Cognitive-Linguistic therapy    -Frequency: 1x weekly  -Duration: 4-6 weeks    -Intervention certification from: 2/7/2679  -Intervention certification to: ***    -Intervention comments:***      Visit Tracking:  -Referring provider: Epic  -Billing guidelines: AMA  -Visit #5 ***  -[facility]     -RE due ***

## 2019-06-10 ENCOUNTER — OFFICE VISIT (OUTPATIENT)
Dept: OBGYN CLINIC | Facility: HOSPITAL | Age: 34
End: 2019-06-10
Payer: COMMERCIAL

## 2019-06-10 VITALS
SYSTOLIC BLOOD PRESSURE: 112 MMHG | HEART RATE: 89 BPM | DIASTOLIC BLOOD PRESSURE: 75 MMHG | WEIGHT: 120 LBS | BODY MASS INDEX: 20.49 KG/M2 | HEIGHT: 64 IN

## 2019-06-10 DIAGNOSIS — M25.571 ACUTE RIGHT ANKLE PAIN: ICD-10-CM

## 2019-06-10 DIAGNOSIS — S93.401A MILD SPRAIN OF RIGHT ANKLE, INITIAL ENCOUNTER: Primary | ICD-10-CM

## 2019-06-10 PROCEDURE — 99213 OFFICE O/P EST LOW 20 MIN: CPT | Performed by: PHYSICIAN ASSISTANT

## 2019-06-11 ENCOUNTER — APPOINTMENT (OUTPATIENT)
Dept: SPEECH THERAPY | Facility: CLINIC | Age: 34
End: 2019-06-11
Payer: COMMERCIAL

## 2019-06-19 ENCOUNTER — EVALUATION (OUTPATIENT)
Dept: SPEECH THERAPY | Facility: CLINIC | Age: 34
End: 2019-06-19
Payer: COMMERCIAL

## 2019-06-19 DIAGNOSIS — R48.8 OTHER SYMBOLIC DYSFUNCTIONS: Primary | ICD-10-CM

## 2019-06-19 DIAGNOSIS — R41.3 MEMORY CHANGES: ICD-10-CM

## 2019-06-19 PROCEDURE — 92507 TX SP LANG VOICE COMM INDIV: CPT

## 2019-06-20 ENCOUNTER — CLINICAL SUPPORT (OUTPATIENT)
Dept: PAIN MEDICINE | Facility: CLINIC | Age: 34
End: 2019-06-20
Payer: COMMERCIAL

## 2019-06-20 VITALS
TEMPERATURE: 98.5 F | HEIGHT: 64 IN | WEIGHT: 122 LBS | HEART RATE: 71 BPM | SYSTOLIC BLOOD PRESSURE: 123 MMHG | DIASTOLIC BLOOD PRESSURE: 80 MMHG | BODY MASS INDEX: 20.83 KG/M2

## 2019-06-20 DIAGNOSIS — M54.6 CHRONIC MIDLINE THORACIC BACK PAIN: ICD-10-CM

## 2019-06-20 DIAGNOSIS — M79.18 MYOFASCIAL PAIN: Primary | ICD-10-CM

## 2019-06-20 DIAGNOSIS — M54.16 LUMBAR RADICULOPATHY: ICD-10-CM

## 2019-06-20 DIAGNOSIS — G89.29 CHRONIC MIDLINE THORACIC BACK PAIN: ICD-10-CM

## 2019-06-20 DIAGNOSIS — S34.101D INJURY TO L1 LEVEL OF SPINAL CORD, SUBSEQUENT ENCOUNTER (HCC): ICD-10-CM

## 2019-06-20 PROCEDURE — 99214 OFFICE O/P EST MOD 30 MIN: CPT | Performed by: ANESTHESIOLOGY

## 2019-06-20 RX ORDER — GABAPENTIN 300 MG/1
300 CAPSULE ORAL 3 TIMES DAILY
Qty: 90 CAPSULE | Refills: 1 | Status: SHIPPED | OUTPATIENT
Start: 2019-06-20 | End: 2019-08-01

## 2019-06-20 RX ORDER — CHLORZOXAZONE 500 MG/1
500 TABLET ORAL 4 TIMES DAILY PRN
Qty: 120 TABLET | Refills: 1 | Status: SHIPPED | OUTPATIENT
Start: 2019-06-20 | End: 2019-08-01 | Stop reason: SDUPTHER

## 2019-06-23 DIAGNOSIS — N39.41 URGE INCONTINENCE: ICD-10-CM

## 2019-06-24 ENCOUNTER — LAB (OUTPATIENT)
Dept: LAB | Facility: HOSPITAL | Age: 34
End: 2019-06-24
Payer: COMMERCIAL

## 2019-06-24 ENCOUNTER — OFFICE VISIT (OUTPATIENT)
Dept: SPEECH THERAPY | Facility: CLINIC | Age: 34
End: 2019-06-24
Payer: COMMERCIAL

## 2019-06-24 ENCOUNTER — TELEPHONE (OUTPATIENT)
Dept: UROLOGY | Facility: MEDICAL CENTER | Age: 34
End: 2019-06-24

## 2019-06-24 DIAGNOSIS — R30.0 BURNING WITH URINATION: Primary | ICD-10-CM

## 2019-06-24 DIAGNOSIS — R30.0 BURNING WITH URINATION: ICD-10-CM

## 2019-06-24 DIAGNOSIS — R48.8 OTHER SYMBOLIC DYSFUNCTIONS: Primary | ICD-10-CM

## 2019-06-24 DIAGNOSIS — R41.3 MEMORY CHANGES: ICD-10-CM

## 2019-06-24 DIAGNOSIS — Z72.51 HIGH RISK HETEROSEXUAL BEHAVIOR: ICD-10-CM

## 2019-06-24 DIAGNOSIS — E78.00 ELEVATED CHOLESTEROL: ICD-10-CM

## 2019-06-24 LAB
ALBUMIN SERPL BCP-MCNC: 3.8 G/DL (ref 3.5–5)
ALP SERPL-CCNC: 67 U/L (ref 46–116)
ALT SERPL W P-5'-P-CCNC: 19 U/L (ref 12–78)
ANION GAP SERPL CALCULATED.3IONS-SCNC: 4 MMOL/L (ref 4–13)
AST SERPL W P-5'-P-CCNC: 13 U/L (ref 5–45)
BACTERIA UR QL AUTO: ABNORMAL /HPF
BILIRUB SERPL-MCNC: 0.28 MG/DL (ref 0.2–1)
BILIRUB UR QL STRIP: NEGATIVE
BUN SERPL-MCNC: 8 MG/DL (ref 5–25)
CALCIUM SERPL-MCNC: 8.7 MG/DL (ref 8.3–10.1)
CHLORIDE SERPL-SCNC: 106 MMOL/L (ref 100–108)
CHOLEST SERPL-MCNC: 176 MG/DL (ref 50–200)
CLARITY UR: CLEAR
CO2 SERPL-SCNC: 29 MMOL/L (ref 21–32)
COLOR UR: YELLOW
CREAT SERPL-MCNC: 0.45 MG/DL (ref 0.6–1.3)
GFR SERPL CREATININE-BSD FRML MDRD: 134 ML/MIN/1.73SQ M
GLUCOSE SERPL-MCNC: 80 MG/DL (ref 65–140)
GLUCOSE UR STRIP-MCNC: NEGATIVE MG/DL
HBV SURFACE AG SER QL: NORMAL
HDLC SERPL-MCNC: 68 MG/DL (ref 40–60)
HGB UR QL STRIP.AUTO: NEGATIVE
HYALINE CASTS #/AREA URNS LPF: ABNORMAL /LPF
KETONES UR STRIP-MCNC: NEGATIVE MG/DL
LDLC SERPL CALC-MCNC: 87 MG/DL (ref 0–100)
LEUKOCYTE ESTERASE UR QL STRIP: NEGATIVE
NITRITE UR QL STRIP: NEGATIVE
NON-SQ EPI CELLS URNS QL MICRO: ABNORMAL /HPF
PH UR STRIP.AUTO: 8.5 [PH]
POTASSIUM SERPL-SCNC: 4 MMOL/L (ref 3.5–5.3)
PROT SERPL-MCNC: 7 G/DL (ref 6.4–8.2)
PROT UR STRIP-MCNC: NEGATIVE MG/DL
RBC #/AREA URNS AUTO: ABNORMAL /HPF
SODIUM SERPL-SCNC: 139 MMOL/L (ref 136–145)
SP GR UR STRIP.AUTO: 1.01 (ref 1–1.03)
TRIGL SERPL-MCNC: 106 MG/DL
UROBILINOGEN UR QL STRIP.AUTO: 1 E.U./DL
WBC #/AREA URNS AUTO: ABNORMAL /HPF

## 2019-06-24 PROCEDURE — 81001 URINALYSIS AUTO W/SCOPE: CPT

## 2019-06-24 PROCEDURE — 87077 CULTURE AEROBIC IDENTIFY: CPT

## 2019-06-24 PROCEDURE — 87086 URINE CULTURE/COLONY COUNT: CPT

## 2019-06-24 PROCEDURE — 36415 COLL VENOUS BLD VENIPUNCTURE: CPT

## 2019-06-24 PROCEDURE — 92507 TX SP LANG VOICE COMM INDIV: CPT

## 2019-06-24 PROCEDURE — 80053 COMPREHEN METABOLIC PANEL: CPT

## 2019-06-24 PROCEDURE — 87186 SC STD MICRODIL/AGAR DIL: CPT

## 2019-06-24 PROCEDURE — 87340 HEPATITIS B SURFACE AG IA: CPT

## 2019-06-24 PROCEDURE — 87389 HIV-1 AG W/HIV-1&-2 AB AG IA: CPT

## 2019-06-24 PROCEDURE — 86592 SYPHILIS TEST NON-TREP QUAL: CPT

## 2019-06-24 PROCEDURE — 80061 LIPID PANEL: CPT

## 2019-06-25 ENCOUNTER — TELEPHONE (OUTPATIENT)
Dept: NEUROLOGY | Facility: CLINIC | Age: 34
End: 2019-06-25

## 2019-06-25 ENCOUNTER — TELEPHONE (OUTPATIENT)
Dept: FAMILY MEDICINE CLINIC | Facility: CLINIC | Age: 34
End: 2019-06-25

## 2019-06-25 LAB — RPR SER QL: NORMAL

## 2019-06-26 ENCOUNTER — TELEPHONE (OUTPATIENT)
Dept: UROLOGY | Facility: AMBULATORY SURGERY CENTER | Age: 34
End: 2019-06-26

## 2019-06-26 DIAGNOSIS — N39.0 ACUTE UTI: Primary | ICD-10-CM

## 2019-06-26 LAB
BACTERIA UR CULT: ABNORMAL
HIV 1+2 AB+HIV1 P24 AG SERPL QL IA: NORMAL

## 2019-06-26 RX ORDER — NITROFURANTOIN 25; 75 MG/1; MG/1
100 CAPSULE ORAL 2 TIMES DAILY
Qty: 14 CAPSULE | Refills: 0 | Status: SHIPPED | OUTPATIENT
Start: 2019-06-26 | End: 2019-07-03

## 2019-06-28 ENCOUNTER — TELEPHONE (OUTPATIENT)
Dept: UROLOGY | Facility: AMBULATORY SURGERY CENTER | Age: 34
End: 2019-06-28

## 2019-06-28 PROBLEM — N31.9 NEUROGENIC BLADDER: Status: ACTIVE | Noted: 2019-06-28

## 2019-06-28 NOTE — PROGRESS NOTES
Assessment:       1  Acute right ankle pain    2  Mild sprain of right ankle, initial encounter    3  Neurologic gait dysfunction          Plan:      I explained my current clinical findings and reviewed radiological findings with [facility]  I suspect that she has a potential contusion of the right talar dome and medial malleolus along with a deltoid sprain  Her right lateral ankle pain has now fully resolved and there is no tenderness in this area  However I have explained that pain in her foot is going to been inaccurate indicator of her injury secondary to her neurological deficit  At this time, I will refer her for a course of physical therapy to help improve her ambulatory status  She may consider wearing a soft wrap around ankle support on the right side to help her with this effort  I will see her back in about 4 weeks time for clinical re-evaluation in this regard  Subjective:     Patient ID: Anne Marie Hart is a 34 y o  female  Chief Complaint:  Right ankle injury    HPI   Patient is a pleasant 33 yo F presenting for evaluation of right ankle injury  Sustained right ankle injury about 5 weeks ago when she fell at home  Seen by orthopedic KITTY Porras on 06/10/2019  Plain radiograph of the right ankle and right foot done on 06/03/2019 did not reveal any acute osseous abnormality  She was not very symptomatic and hence held off from physical therapy  Background history is significant for spinal cord injury which she has bilateral lower extremity weakness below her knees along with bilateral foot drop  At her baseline, she was ambulatory using MAFO bilaterally  Subsequent to her injury, she has experienced some persistent discomfort of her right ankle which is now localized to the medial aspect  It is mild and worse with weight-bearing  It is nonradiating and improves with rest   She currently prefers to using a wheelchair due to her right ankle discomfort       Social History Occupational History    Not on file   Tobacco Use    Smoking status: Former Smoker     Packs/day: 0 70     Years: 1 00     Pack years: 0 70     Types: Cigarettes     Last attempt to quit: 2016     Years since quittin 5    Smokeless tobacco: Never Used   Substance and Sexual Activity    Alcohol use: Yes     Frequency: 2-4 times a month     Drinks per session: 1 or 2     Binge frequency: Never    Drug use: No    Sexual activity: Yes     Partners: Male     Birth control/protection: Coitus interruptus, IUD      Review of Systems   Constitutional: Negative  Negative for chills and fever  HENT: Negative  Negative for congestion, postnasal drip and sore throat  Eyes: Negative  Respiratory: Negative  Negative for cough, choking, shortness of breath and wheezing  Cardiovascular: Negative  Negative for chest pain and palpitations  Gastrointestinal: Negative  Negative for abdominal pain, diarrhea, nausea and vomiting  Endocrine: Negative  Genitourinary: Negative  Negative for decreased urine volume and difficulty urinating  Musculoskeletal:        As above in HPI   Skin: Negative  Allergic/Immunologic: Negative  Neurological: Negative  Negative for dizziness and light-headedness  Hematological: Negative  Psychiatric/Behavioral: Negative  Objective:     Ortho ExamPhysical Exam   Constitutional: She is oriented to person, place, and time  She appears well-developed and well-nourished  No distress  HENT:   Head: Normocephalic and atraumatic  Eyes: Right eye exhibits no discharge  Left eye exhibits no discharge  Pulmonary/Chest: Effort normal  No respiratory distress  Neurological: She is alert and oriented to person, place, and time  No cranial nerve deficit  Skin: She is not diaphoretic  Psychiatric: She has a normal mood and affect  Her behavior is normal  Judgment and thought content normal    Nursing note and vitals reviewed        Right ankle exam:  Patient has bilateral foot drop at baseline  Limited neurological evaluation of the lower extremity reveals hypoesthesia bilaterally in L4 and minimal sensation to pinprick touch of the L5 and S1 levels  There is normal sensation to pinprick touch bilaterally at L1-L2 and clinically diminished bilaterally in L3  Right ankle strength is grade 3/5 in extension  All other movements are grade 0/5  Bilateral knee and ankle deep tendon reflexes are absent  There is no swelling noted of the right ankle  There is no tenderness to palpation over the syndesmosis, lateral malleolus, ATFL, CFL or PT FL  There is mild tenderness to palpation over the medial malleolus as well as the deltoid ligament and medial talus  There is no discomfort on syndesmotic squeeze or passive external rotation of the right ankle  Negative anterior drawer test       I have personally reviewed pertinent films in PACS and my interpretation is As noted in the HPI

## 2019-07-01 ENCOUNTER — APPOINTMENT (OUTPATIENT)
Dept: SPEECH THERAPY | Facility: CLINIC | Age: 34
End: 2019-07-01
Payer: COMMERCIAL

## 2019-07-02 ENCOUNTER — OFFICE VISIT (OUTPATIENT)
Dept: OBGYN CLINIC | Facility: OTHER | Age: 34
End: 2019-07-02
Payer: COMMERCIAL

## 2019-07-02 ENCOUNTER — OFFICE VISIT (OUTPATIENT)
Dept: SPEECH THERAPY | Facility: CLINIC | Age: 34
End: 2019-07-02
Payer: COMMERCIAL

## 2019-07-02 VITALS
SYSTOLIC BLOOD PRESSURE: 108 MMHG | BODY MASS INDEX: 20.6 KG/M2 | HEIGHT: 64 IN | HEART RATE: 71 BPM | DIASTOLIC BLOOD PRESSURE: 70 MMHG

## 2019-07-02 DIAGNOSIS — M25.571 ACUTE RIGHT ANKLE PAIN: Primary | ICD-10-CM

## 2019-07-02 DIAGNOSIS — R41.3 MEMORY CHANGES: ICD-10-CM

## 2019-07-02 DIAGNOSIS — R48.8 OTHER SYMBOLIC DYSFUNCTIONS: Primary | ICD-10-CM

## 2019-07-02 DIAGNOSIS — R26.9 NEUROLOGIC GAIT DYSFUNCTION: ICD-10-CM

## 2019-07-02 DIAGNOSIS — S93.401A MILD SPRAIN OF RIGHT ANKLE, INITIAL ENCOUNTER: ICD-10-CM

## 2019-07-02 PROCEDURE — 99214 OFFICE O/P EST MOD 30 MIN: CPT | Performed by: ORTHOPAEDIC SURGERY

## 2019-07-02 PROCEDURE — 92507 TX SP LANG VOICE COMM INDIV: CPT

## 2019-07-02 NOTE — PROGRESS NOTES
Daily Speech Treatment Note    Today's date: 2019  Patients name: Leslie Huffman  : 1985  MRN: [id number]  Safety measures: fall risk   Referring provider: Narayan Bundy MD    Primary Diagnosis/Billing code: C57 4  Secondary Diagnosis/ Billing code: R41 3    Visit Tracking:  -Referring provider: Epic  -Billing guidelines: AMA  -Visit #8  [facility]  -RE due 2019     Subjective/Behavioral:  "I saw the orthopedist today and he wants me to have PT, I was wondering if I could just get it here, cause this place is awesome"    Objective/Assessment:      Short-term goals:  2  Patient will complete complex auditory attention processing tasks (e g , sentence unscramble, ranking numbers/words, etc ) to improve working memory with 80% accuracy, to be achieved in 4-6 weeks  3  Patient will facilitate planning by completing thought organization tasks (e g , sequencing, deduction puzzles, etc ) with 80% accuracy to facilitate increased executive functioning, working memory, problem solving, and processing skills, to be achieved in 4-6 weeks  4  Patient will complete auditory immediate and short term memory tasks to 80% accuracy to facilitate increased ability to retell narratives and recall information within functional living environment, to be achieved in 4-6 weeks  6  Patient will complete word generation tasks (e g , analogies, category matrices, etc ) with 80% accuracy using word finding strategies to facilitate improved word retrieval skills, to be achieved in 4-6 weeks  Intangible Word List: Patient was asked to describe abstract words (I e , petrified) to clinician  Task completed over 30 trials  Then, the clinician described similar words for her to guess, completed over 30 trials  Task took entire session, patient was noted to use appropriate imagery and descriptions with extra time  Required cues x 3 when clinician described words       7  Patient will demonstrate sustained attention by attending to one task for an extended amount of time >5min provided with min cues in a distracting environment with 80% accuracy, to be achieved in 4-6 weeks  Plan:  -Continue with current plan of care

## 2019-07-05 NOTE — PROGRESS NOTES
Daily Speech Treatment Note    Today's date: 2019 (***update)  Patients name: Niko Atwood  : 1985  MRN: [id number]  Safety measures: fall risk   Referring provider: Chiquis Poon MD    Primary Diagnosis/Billing code: U35 9  Secondary Diagnosis/ Billing code: R41 3    Visit Tracking:  -Referring provider: Epic  -Billing guidelines: AMA  -Visit #8  (***update)  -[facility]  -RE due 2019     Subjective/Behavioral:  -***    Objective/Assessment:    Short-term goals:  2  Patient will complete complex auditory attention processing tasks (e g , sentence unscramble, ranking numbers/words, etc ) to improve working memory with 80% accuracy, to be achieved in 4-6 weeks  3  Patient will facilitate planning by completing thought organization tasks (e g , sequencing, deduction puzzles, etc ) with 80% accuracy to facilitate increased executive functioning, working memory, problem solving, and processing skills, to be achieved in 4-6 weeks  4  Patient will complete auditory immediate and short term memory tasks to 80% accuracy to facilitate increased ability to retell narratives and recall information within functional living environment, to be achieved in 4-6 weeks  6  Patient will complete word generation tasks (e g , analogies, category matrices, etc ) with 80% accuracy using word finding strategies to facilitate improved word retrieval skills, to be achieved in 4-6 weeks  7  Patient will demonstrate sustained attention by attending to one task for an extended amount of time >5min provided with min cues in a distracting environment with 80% accuracy, to be achieved in 4-6 weeks  Plan:  -Continue with current plan of care

## 2019-07-08 ENCOUNTER — APPOINTMENT (OUTPATIENT)
Dept: SPEECH THERAPY | Facility: CLINIC | Age: 34
End: 2019-07-08
Payer: COMMERCIAL

## 2019-07-09 ENCOUNTER — APPOINTMENT (OUTPATIENT)
Dept: LAB | Facility: HOSPITAL | Age: 34
End: 2019-07-09
Attending: UROLOGY
Payer: COMMERCIAL

## 2019-07-09 ENCOUNTER — HOSPITAL ENCOUNTER (OUTPATIENT)
Dept: SLEEP CENTER | Facility: CLINIC | Age: 34
Discharge: HOME | End: 2019-07-09
Payer: COMMERCIAL

## 2019-07-09 DIAGNOSIS — N31.9 NEUROGENIC BLADDER: ICD-10-CM

## 2019-07-09 DIAGNOSIS — G47.10 HYPERSOMNIA: ICD-10-CM

## 2019-07-09 DIAGNOSIS — R06.83 SNORING: ICD-10-CM

## 2019-07-09 PROCEDURE — 87086 URINE CULTURE/COLONY COUNT: CPT

## 2019-07-09 PROCEDURE — 95810 POLYSOM 6/> YRS 4/> PARAM: CPT

## 2019-07-10 LAB — BACTERIA UR CULT: NORMAL

## 2019-07-10 NOTE — PROGRESS NOTES
Sleep Study Documentation    Pre-Sleep Study       Sleep testing procedure explained to patient:YES    Patient napped prior to study:NO    Caffeine:Dayshift worker after 12PM   Caffeine use:NO    Alcohol:Dayshift workers after 5PM: Alcohol use:NO    Typical day for patient:YES       Study Documentation    Sleep Study Indications:     Sleep Study: Diagnostic   Snore:mild  Supplemental O2: no    O2 flow rate (L/min) range 0  O2 flow rate (L/min) final 0  Minimum SaO2 90  Baseline SaO2 96            EKG abnormalities: no     EEG abnormalities: no    Sleep Study Recorded < 2 hours: N/A    Sleep Study Recorded > 2 hours but incomplete study: N/A    Sleep Study Recorded 6 hours but no sleep obtained: NO    Patient classification: employed       Post-Sleep Study    Medication used at bedtime or during sleep study:YES other prescription medications    Patient reports time it took to fall asleep:30 to 60 minutes    Patient reports waking up during study:3 or more times  Patient reports returning to sleep in 10 to 30 minutes  Patient reports sleeping 4 to 6 hours with dreaming  Patient reports sleep during study:worse than usual    Patient rated sleepiness: Somewhat sleepy or tired    PAP treatment:no

## 2019-07-12 ENCOUNTER — TELEPHONE (OUTPATIENT)
Dept: SLEEP CENTER | Facility: CLINIC | Age: 34
End: 2019-07-12

## 2019-07-15 ENCOUNTER — TELEPHONE (OUTPATIENT)
Dept: UROLOGY | Facility: HOSPITAL | Age: 34
End: 2019-07-15

## 2019-07-15 ENCOUNTER — OFFICE VISIT (OUTPATIENT)
Dept: SPEECH THERAPY | Facility: CLINIC | Age: 34
End: 2019-07-15
Payer: COMMERCIAL

## 2019-07-15 DIAGNOSIS — R48.8 OTHER SYMBOLIC DYSFUNCTIONS: Primary | ICD-10-CM

## 2019-07-15 DIAGNOSIS — R41.3 MEMORY CHANGES: ICD-10-CM

## 2019-07-15 PROCEDURE — 92507 TX SP LANG VOICE COMM INDIV: CPT

## 2019-07-15 NOTE — PROGRESS NOTES
Daily Speech Treatment Note    Today's date: 7/15/2019  Patients name: Alfreda Israel  : 1985  MRN: [id number]  Safety measures: fall risk   Referring provider: Marita Aguilar MD    Primary Diagnosis/Billing code: G01 2  Secondary Diagnosis/ Billing code: R41 3    Visit Tracking:  -Referring provider: Epic  -Billing guidelines: AMA  -Visit #9  -[facility]  -RE due 2019     Subjective/Behavioral:  "I'm going to try to schedule PT here "     Objective/Assessment:  Did not bring back HEP  Short-term goals:  2  Patient will complete complex auditory attention processing tasks (e g , sentence unscramble, ranking numbers/words, etc ) to improve working memory with 80% accuracy, to be achieved in 4-6 weeks  3  Patient will facilitate planning by completing thought organization tasks (e g , sequencing, deduction puzzles, etc ) with 80% accuracy to facilitate increased executive functioning, working memory, problem solving, and processing skills, to be achieved in 4-6 weeks  Deduction Puzzles: Pt was given a paragraph of clues in order to fill in a specific grid/table with the correct order (e g  Stops for a furniture delivery)  Clues were provided via auditory  Task completed over 4 trials  Completing each puzzle to 100% acc  4  Patient will complete auditory immediate and short term memory tasks to 80% accuracy to facilitate increased ability to retell narratives and recall information within functional living environment, to be achieved in 4-6 weeks  6  Patient will complete word generation tasks (e g , analogies, category matrices, etc ) with 80% accuracy using word finding strategies to facilitate improved word retrieval skills, to be achieved in 4-6 weeks  Intangible Word List: Patient was asked to describe abstract words (I e , petrified) to clinician  Task completed over 30 trials  Then, the clinician described similar words for her to guess, completed over 30 trials   Task took entire session, patient was noted to use appropriate imagery and descriptions with extra time  Required cues x 5 when clinician described words  7  Patient will demonstrate sustained attention by attending to one task for an extended amount of time >5min provided with min cues in a distracting environment with 80% accuracy, to be achieved in 4-6 weeks  Plan:  -Continue with current plan of care

## 2019-07-15 NOTE — TELEPHONE ENCOUNTER
KATARINA    S/w pt, she was taking Gabapentin 300 mg BID and it was causing her to feel very tired and loopy so she weaned down to 1 tab at HS but then she said she got "withdraw symptoms" such as anxiety, crying all the time and was very emotional so she went back to BID  Pt said she needs to know how to get off this medication safely  Originally told pt to take 1 300 mg at HS for 5 days then stop  Pt said that is what she tried before but it caused the withdraw  Told pt she could try taking one pill every other day in the AM and 1 pill at HS for 5 days then going to 5 days at HS then stop  Pt said she will try

## 2019-07-15 NOTE — TELEPHONE ENCOUNTER
Pt states she was taking Gabapentin 300mg (1 capsule (300 mg total) by mouth 2 times a day)  Pt states it was making her feel very tired and loopy  She said she titrated down to once a day and it made her feel emotional and now she is taking it twice a day again  Pt wishes to stop it completely  Please advise

## 2019-07-15 NOTE — TELEPHONE ENCOUNTER
Patient returned call  Advised urine culture from last week does not indicate infection  Patient states she is feeling better

## 2019-07-15 NOTE — TELEPHONE ENCOUNTER
Left message for patient to call office back to discuss  Patient's most recent culture does not show infection, only mixed contaminants

## 2019-07-15 NOTE — TELEPHONE ENCOUNTER
Pt left message on machine stating she had urine test done and she is positive for an infection, she wants to know if meds are being sent to the pharmacy, please call pt

## 2019-07-22 ENCOUNTER — OFFICE VISIT (OUTPATIENT)
Dept: SPEECH THERAPY | Facility: CLINIC | Age: 34
End: 2019-07-22
Payer: COMMERCIAL

## 2019-07-22 DIAGNOSIS — R48.8 OTHER SYMBOLIC DYSFUNCTIONS: Primary | ICD-10-CM

## 2019-07-22 DIAGNOSIS — R41.3 MEMORY CHANGES: ICD-10-CM

## 2019-07-22 PROCEDURE — 92507 TX SP LANG VOICE COMM INDIV: CPT

## 2019-07-22 NOTE — PRE-PROCEDURE INSTRUCTIONS
Pre-Surgery Instructions:   Medication Instructions    busPIRone (BUSPAR) 10 mg tablet Patient was instructed by Physician and understands   carBAMazepine (TEGretol) 200 mg tablet Patient was instructed by Physician and understands   chlorzoxazone (PARAFON FORTE) 500 mg tablet Patient was instructed by Physician and understands   gabapentin (NEURONTIN) 300 mg capsule Patient was instructed by Physician and understands   hydrOXYzine HCL (ATARAX) 10 mg tablet Patient was instructed by Physician and understands   levonorgestrel (MIRENA) 20 MCG/24HR IUD Patient was instructed by Physician and understands   Mirabegron ER (MYRBETRIQ) 50 MG TB24 Patient was instructed by Physician and understands   oxybutynin (DITROPAN XL) 15 MG 24 hr tablet Patient was instructed by Physician and understands   sertraline (ZOLOFT) 100 mg tablet Patient was instructed by Physician and understands  Pre shower with hibiclens as instructed by surgeon  You may drive yourself to the hospital   You may take your medications day of surgery  You may eat on the day of your surgery  You may have a dressing, please wear something that will fit over it

## 2019-07-22 NOTE — PROGRESS NOTES
Daily Speech Treatment Note    Today's date: 2019  Patients name: Naima Vivas  : 1985  MRN: [id number]  Safety measures: fall risk   Referring provider: Jak Coley MD    Primary Diagnosis/Billing code: D79 3  Secondary Diagnosis/ Billing code: R41 3    Visit Tracking:  -Referring provider: Epic  -Billing guidelines: AMA  -Visit #10  -[facility]  -RE due 2019     Subjective/Behavioral:  "I think next session for discharge will be good " Pt reports she feels she has made significant progress and is in agreement for discharge next session  Objective/Assessment:  Did not bring back HEP  Short-term goals:  2  Patient will complete complex auditory attention processing tasks (e g , sentence unscramble, ranking numbers/words, etc ) to improve working memory with 80% accuracy, to be achieved in 4-6 weeks  3  Patient will facilitate planning by completing thought organization tasks (e g , sequencing, deduction puzzles, etc ) with 80% accuracy to facilitate increased executive functioning, working memory, problem solving, and processing skills, to be achieved in 4-6 weeks  To target thought organization skills through sequencing and manipulation, pt played PathWords  Pt was provided with 4x6 grid-like structures that get placed onto a grid with words hidden about  Pt instructed to manipulate figures so they fit nicely together  With min verbal cues for explanation, pt able to complete 7/7 puzzles (intermediate level)  4  Patient will complete auditory immediate and short term memory tasks to 80% accuracy to facilitate increased ability to retell narratives and recall information within functional living environment, to be achieved in 4-6 weeks  BrainBox Nature: Pt was instructed to view a 2x2 picture card with various facts on a specific animal (e g  Sharks)  Pt reportedly using visualization and association strategies  Pt then answered 8 detailed questions regarding picture   Task completed over 4 trials; answering 32/32 (100%) questions correctly  6  Patient will complete word generation tasks (e g , analogies, category matrices, etc ) with 80% accuracy using word finding strategies to facilitate improved word retrieval skills, to be achieved in 4-6 weeks  Intangible Word List: Patient was asked to describe abstract words (I e , petrified) to clinician  Task completed over 30 trials  Patient was noted to use appropriate imagery and descriptions with extra time  Required cues x2 when clinician described words  7  Patient will demonstrate sustained attention by attending to one task for an extended amount of time >5min provided with min cues in a distracting environment with 80% accuracy, to be achieved in 4-6 weeks  Session completed with jakob viera  Pt appeared to have sustained attention for 100% of session  Plan:  -Continue with current plan of care

## 2019-07-25 ENCOUNTER — EVALUATION (OUTPATIENT)
Dept: PHYSICAL THERAPY | Facility: CLINIC | Age: 34
End: 2019-07-25
Payer: COMMERCIAL

## 2019-07-25 DIAGNOSIS — S93.401D MILD SPRAIN OF RIGHT ANKLE, SUBSEQUENT ENCOUNTER: ICD-10-CM

## 2019-07-25 DIAGNOSIS — R26.9 NEUROLOGIC GAIT DYSFUNCTION: ICD-10-CM

## 2019-07-25 DIAGNOSIS — M25.571 ACUTE RIGHT ANKLE PAIN: Primary | ICD-10-CM

## 2019-07-25 PROCEDURE — 97163 PT EVAL HIGH COMPLEX 45 MIN: CPT | Performed by: PHYSICAL THERAPIST
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
## 2019-07-29 ENCOUNTER — APPOINTMENT (OUTPATIENT)
Dept: SPEECH THERAPY | Facility: CLINIC | Age: 34
End: 2019-07-29
Payer: COMMERCIAL

## 2019-07-29 ENCOUNTER — HOSPITAL ENCOUNTER (OUTPATIENT)
Facility: HOSPITAL | Age: 34
Setting detail: OUTPATIENT SURGERY
Discharge: HOME | End: 2019-07-29
Attending: UROLOGY | Admitting: UROLOGY
Payer: COMMERCIAL

## 2019-07-29 VITALS
BODY MASS INDEX: 20.49 KG/M2 | WEIGHT: 120 LBS | TEMPERATURE: 98.6 F | SYSTOLIC BLOOD PRESSURE: 121 MMHG | HEIGHT: 64 IN | HEART RATE: 58 BPM | RESPIRATION RATE: 16 BRPM | OXYGEN SATURATION: 97 % | DIASTOLIC BLOOD PRESSURE: 70 MMHG

## 2019-07-29 PROCEDURE — 52287 CYSTOSCOPY CHEMODENERVATION: CPT | Performed by: UROLOGY

## 2019-07-29 PROCEDURE — NC001 PR NO CHARGE: Performed by: UROLOGY

## 2019-07-29 RX ORDER — LIDOCAINE HYDROCHLORIDE 20 MG/ML
20 INJECTION, SOLUTION EPIDURAL; INFILTRATION; INTRACAUDAL; PERINEURAL ONCE
Status: COMPLETED | OUTPATIENT
Start: 2019-07-29 | End: 2019-07-29

## 2019-07-29 RX ORDER — CIPROFLOXACIN 500 MG/1
500 TABLET, FILM COATED ORAL ONCE
Status: COMPLETED | OUTPATIENT
Start: 2019-07-29 | End: 2019-07-29

## 2019-07-29 RX ADMIN — CIPROFLOXACIN HYDROCHLORIDE 500 MG: 500 TABLET, FILM COATED ORAL at 13:37

## 2019-07-29 RX ADMIN — LIDOCAINE HYDROCHLORIDE 20 ML: 20 INJECTION, SOLUTION EPIDURAL; INFILTRATION; INTRACAUDAL; PERINEURAL at 13:31

## 2019-07-29 NOTE — OP NOTE
OPERATIVE REPORT  PATIENT NAME: Chelsi Chen    :  1985  MRN: [id number]  Pt Location:  [facility] ROOM 01    SURGERY DATE: 2019    Surgeon(s) and Role:     Yoel Hanson MD - Primary    Preop Diagnosis:  Neurogenic bladder [N31 9]    Post-Op Diagnosis Codes:     * Neurogenic bladder [N31 9]    Procedure(s) (LRB):  CYSTOSCOPY (N/A)  INJECTION BOTULINUM TOXIN (BOTOX) (N/A)    Specimen(s):  * No specimens in log *    Estimated Blood Loss:   Minimal    Drains:  * No LDAs found *    Anesthesia Type:   Local    Operative Indications:  Neurogenic bladder [N31 9]      Operative Findings:  No urothelial lesions noted  100 units of Botox injected  Complications:   None    Procedure and Technique:  After informed consent was obtained the patient was brought to the procedure room  Preoperative antibiotics were given  A timeout was performed to identify the patient and surgery be performed  The patient was then prepped and draped in standard sterile fashion in dorsal lithotomy position  16 [place] rigid cystoscope was inserted per urethra and into the bladder  A quick diagnostic cystoscopy was performed that showed no urothelial lesions  Using a Bonee needle 100 units of Botox that had been reconstituted in 10 mL of saline were injected in a grid like pattern around the bladder above the area of the trigone  The injections were performed in 0 5  mL increments  The bladder was reinspected under low pressure conditions and we are happy with our hemostasis  The patient's bladder was emptied and they were taken to the [facility] in stable condition       I was present for the entire procedure    Patient Disposition:  [facility]    SIGNATURE: Shameka Singletary MD  DATE: 2019  TIME: 2:32 PM

## 2019-07-29 NOTE — DISCHARGE INSTRUCTIONS
CYSTOSCOPY, TURBT, PROSTATE BIOPSY, BLADDER BIOPSY   DISCHARGE INSTRUCTIONS    Care after your surgery:  1  You may have burning or red colored urine the first 24 hours  Your burning should  stop in 24 hours and your urine should clear in 24-48 hours  2  You should drink more fluids unless Dr Nicole Kahn advises you not to    3  You should return to normal activities when your urine is clear again  4  You may have a small amount of red drainage from your rectum if you had a prostate  biopsy performed  This should stop in 24 hours  5  Take pain medication as prescribed by your doctor  Call Dr Nicole Kahn if you have any of the followin  You can not urinate or you have active or persistent bleeding, clots, back pain, or  pain when you urinate  2  You have chills, fever above 101 degrees, or feel sick  3  You have persistent nausea or vomiting, persistent dizziness, or lightheadedness  4  Call Dr Nicole Kahn if you have any questions or concerns about your procedure at:  [phone]      Follow-up with Dr Nicole Kahn in 1-2 weeks

## 2019-07-29 NOTE — H&P
UROLOGY H&P NOTE     Patient Identifiers: Neal Yang (MRN [id number])    Date of Service: 2019    History of Present Illness:     Neal Yang is a 34 y o  old with a history of neurogenic bladder speck in there to spinal cord injury presents for Botox injection  The patient had her last procedure canceled due to urinary tract infection but this has been treated her last culture was negative  She has no new complaints  She denies any changes to her health since her last visit  Past Medical, Past Surgical History:     Past Medical History:   Diagnosis Date    Anemia     Anxiety     Depression     Foot drop     Bilateral    History of spinal cord injury     at L1    Mental disorder     Small bowel problem     Chronic Constipation    Urinary incontinence May 30 2013   :    Past Surgical History:   Procedure Laterality Date    COLONOSCOPY      CYSTOSCOPY      Diagnostic; onset: 1/3/17    FL MYELOGRAM 2 OR MORE REGIONS  2018    [place] COLONOSCOPY FLX DX W/COLLJ SPEC WHEN PFRMD N/A 2017    Procedure: COLONOSCOPY;  Surgeon: Phong Sams MD;  [facility]; Service: Gastroenterology    SPINAL FUSION      Lumbar   :    Medications, Allergies:     No current facility-administered medications for this encounter  Allergies:   Allergies   Allergen Reactions    Sulfamethoxazole Nausea Only and GI Intolerance   :    Social and Family History:   Social History:   Social History     Tobacco Use    Smoking status: Former Smoker     Packs/day: 0 70     Years: 1 00     Pack years: 0 70     Types: Cigarettes     Last attempt to quit: 2016     Years since quittin 6    Smokeless tobacco: Never Used   Substance Use Topics    Alcohol use: Yes     Frequency: 2-4 times a month     Drinks per session: 1 or 2     Binge frequency: Never    Drug use: No        Social History     Tobacco Use   Smoking Status Former Smoker    Packs/day: 0 70    Years: 1 00    Pack years: 0 70    Types: Cigarettes    Last attempt to quit: 2016    Years since quittin 6   Smokeless Tobacco Never Used       Family History:  Family History   Problem Relation Age of Onset    Diabetes Father     Lymphoma Father         Waldenstrom's    Heart disease Father         cardiac disorder    Hypertension Father     Pulmonary embolism Father     Hyperlipidemia Father     Cancer Father     Clotting disorder Father     Heart attack Father     Stroke Family     Depression Mother     No Known Problems Sister     No Known Problems Brother     Stroke Maternal Grandmother    :     Review of Systems:     General: Fever, chills, or night sweats: negative  Cardiac: Negative for chest pain  Pulmonary: Negative for shortness of breath  Gastrointestinal: Abdominal pain negative  Nausea, vomiting, or diarrhea negative,  Genitourinary: See HPI above  Patient does not have hematuria  All other systems queried were negative  Physical Exam:   General: Patient is pleasant and in NAD  Awake and alert  There were no vitals taken for this visit  No data recorded  current; No intake/output data recorded  Cardiac: Peripheral edema: negative  Pulmonary: Non-labored breathing  Abdomen: Soft, non-tender, non-distended  No surgical scars  No masses, tenderness, hernias noted  Genitourinary: Negative CVA tenderness, negative suprapubic tenderness      Labs:     Lab Results   Component Value Date    HGB 13 4 2018    HCT 39 3 2018    WBC 3 9 2018     2018   ]    Lab Results   Component Value Date     2017    K 4 0 2019     2019    CO2 29 2019    BUN 8 2019    CREATININE 0 45 (L) 2019    CALCIUM 8 7 2019    GLUCOSE 92 2015   ]    Imaging:   I personally reviewed the images and report of the following studies, and reviewed them with the patient:    none      ASSESSMENT:     34 y o  old female with  neurogenic bladder  PLAN:       We will proceed with cystoscopy and Botox injection today  Risks and benefits were again discussed  All of her questions were answered          Mila Warren MD

## 2019-07-30 ENCOUNTER — TELEPHONE (OUTPATIENT)
Dept: UROLOGY | Facility: AMBULATORY SURGERY CENTER | Age: 34
End: 2019-07-30

## 2019-07-30 ENCOUNTER — OFFICE VISIT (OUTPATIENT)
Dept: SPEECH THERAPY | Facility: CLINIC | Age: 34
End: 2019-07-30
Payer: COMMERCIAL

## 2019-07-30 DIAGNOSIS — R48.8 OTHER SYMBOLIC DYSFUNCTIONS: Primary | ICD-10-CM

## 2019-07-30 DIAGNOSIS — R41.3 MEMORY CHANGES: ICD-10-CM

## 2019-07-30 PROCEDURE — 92507 TX SP LANG VOICE COMM INDIV: CPT

## 2019-07-30 NOTE — PROGRESS NOTES
Speech-Language Pathology Discharge    Today's date: 2019  Patients name: Gudelia Saunders  : 1985  MRN: [id number]  Safety measures: fall risk   Referring provider: Chris Peralta MD    Visit Tracking:   -Referring provider: Epic  -Billing guidelines: AMA  -Visit #11  -[facility]  -RE due 2019     Subjective comments: "I started a new job today"    Patient's goal(s): "to stop forgetting things"     Assessments    No formal DC testing completed on this date  Patient has expressed she feels she has made improvements and met her goals  Goals  Short Term Goals  1  Patient will be educated on the use of internal and external memory aids and compensatory strategies with 80% accuracy to facilitate increased recall of routine, personal information, and recent events, to be achieved in 4-6 weeks  --MET     2  Patient will complete complex auditory attention processing tasks (e g , sentence unscramble, ranking numbers/words, etc ) to improve working memory with 80% accuracy, to be achieved in 4-6 weeks  --MET     3  Patient will facilitate planning by completing thought organization tasks (e g , sequencing, deduction puzzles, etc ) with 80% accuracy to facilitate increased executive functioning, working memory, problem solving, and processing skills, to be achieved in 4-6 weeks  MET     4  Patient will complete auditory immediate and short term memory tasks to 80% accuracy to facilitate increased ability to retell narratives and recall information within functional living environment, to be achieved in 4-6 weeks  MET     5  Patient will be educated on word finding strategies (i e , circumlocution) for improved generative naming and verbal expression skills  MET     6  Patient will complete word generation tasks (e g , analogies, category matrices, etc ) with 80% accuracy using word finding strategies to facilitate improved word retrieval skills, to be achieved in 4-6 weeks  MET     7   Patient will demonstrate sustained attention by attending to one task for an extended amount of time >5min provided with min cues in a distracting environment with 80% accuracy, to be achieved in 4-6 weeks  MET     Long Term Goals    1  Patient will demonstrate cognitive-communication skills consistent with age and education given use of compensatory strategies when needed to resume baseline activities and responsibilities in home, community, and work/school settings by discharge  MET     2  Patient will complete cognitive-linguistic therapy that addresses patients specific deficits in processing speed, short-term working memory, attention to detail, monitoring, sequencing, and organization skills, with instruction, to alleviate effects of executive functioning disorder deficits by discharge  MET     3  Patient will complete higher-level expressive language tasks (e g , word definitions, idioms, synonym/antonyms, etc) with 80% accuracy to improve functional communication skills by discharge  MET       Impressions/Recommendations    Impressions: Patient presents with improvements in her cognition (high level word finding, memory, etc) and has met her goals  Recommendations:  -Patient to be discharged from outpatient skilled Speech Therapy services: Patient achieved all goals in plan of care

## 2019-07-31 ENCOUNTER — TELEPHONE (OUTPATIENT)
Dept: UROLOGY | Facility: MEDICAL CENTER | Age: 34
End: 2019-07-31

## 2019-07-31 NOTE — TELEPHONE ENCOUNTER
I returned patient's call and she stated that she is urinating okay and moving her bowels  She denies fever or pain  She is just experiences nausea and hot flashes  I explained that it could be from the anesthesia  She stated that she only had a local   She said that she almost feels like she has a cold but wanted to make sure that she didn't have an infection  I explained that since she has no fever, she most likely does not have an infection  Please advise

## 2019-07-31 NOTE — TELEPHONE ENCOUNTER
Patient of Dr Ubaldo Sellers seen in [address]  Patient just had Botox surgery yesterday at [facility]  She is experiencing nausea and hot flashes since the surgery and would like to speak to clinical   She can be reached at [phone]

## 2019-08-01 ENCOUNTER — OFFICE VISIT (OUTPATIENT)
Dept: PHYSICAL THERAPY | Facility: CLINIC | Age: 34
End: 2019-08-01
Payer: COMMERCIAL

## 2019-08-01 ENCOUNTER — OFFICE VISIT (OUTPATIENT)
Dept: PAIN MEDICINE | Facility: CLINIC | Age: 34
End: 2019-08-01
Payer: COMMERCIAL

## 2019-08-01 VITALS
WEIGHT: 128 LBS | BODY MASS INDEX: 21.85 KG/M2 | SYSTOLIC BLOOD PRESSURE: 115 MMHG | DIASTOLIC BLOOD PRESSURE: 66 MMHG | HEIGHT: 64 IN | HEART RATE: 77 BPM

## 2019-08-01 DIAGNOSIS — M79.18 MYOFASCIAL PAIN: Primary | ICD-10-CM

## 2019-08-01 DIAGNOSIS — M25.571 ACUTE RIGHT ANKLE PAIN: Primary | ICD-10-CM

## 2019-08-01 DIAGNOSIS — R26.9 NEUROLOGIC GAIT DYSFUNCTION: ICD-10-CM

## 2019-08-01 DIAGNOSIS — S93.401D MILD SPRAIN OF RIGHT ANKLE, SUBSEQUENT ENCOUNTER: ICD-10-CM

## 2019-08-01 PROCEDURE — 97112 NEUROMUSCULAR REEDUCATION: CPT

## 2019-08-01 PROCEDURE — 99214 OFFICE O/P EST MOD 30 MIN: CPT | Performed by: NURSE PRACTITIONER

## 2019-08-01 RX ORDER — CHLORZOXAZONE 500 MG/1
750 TABLET ORAL EVERY 8 HOURS PRN
Qty: 135 TABLET | Refills: 3 | Status: SHIPPED | OUTPATIENT
Start: 2019-08-01 | End: 2020-04-27

## 2019-08-01 NOTE — PROGRESS NOTES
Assessment:  1  Myofascial pain        Plan:  1  I will increase chlorzoxazone to 750mg Q8 hours PRN myofascial pain  I advised the patient that if they experience any side effects or issues with the changes in their medication regiment, they should give our office a call to discuss  I also advised the patient not to drive or operate machinery until they see how the changes in the medication regimen affects them  The patient was agreeable and verbalized an understanding  2  The patient did not find Gabapentin effective and experienced significant sedation from the medication  She has been weaning herself off the medication is currently taking 300 mg q h s     She plans to discontinue in the near future  3  The patient is currently involved in physical therapy  She is looking to establish for soft tissue massage  She was given the number for [facility] massage therapy  4   Patient states she has her medical marijuana certification  She did not find relief with it in the past, but states she is considering retrialing   5  The patient will follow-up in 4 months for medication prescription refill and reevaluation  The patient was advised to contact the office should their symptoms worsen in the interim  The patient was agreeable and verbalized an understanding  Other than as stated above, the patient denies any interval changes in medications, medical condition, mental condition, symptoms, or allergies since the last office visit  M*Modal software was used to dictate this note  It may contain errors with dictating incorrect words or incorrect spelling  Please contact the provider directly with any questions  History of Present Illness: The patient is a 34 y o  female with a history of spinal cord injury in T12-L2 fusion and chronic foot drop last seen on  who presents for a follow up office visit in regards to chronic mid back pain between the scapula   She has cycled through multiple medications and physical therapy without relief  She denies bowel or bladder incontinence or saddle anesthesia  The patient currently rates her pain a 5/10 on the numeric pain rating scale  She states her pain is constant nature most bothersome in the evening  She characterizes the pain as dull aching, cramping and pressure like  Current pain medications includes:  Chlorzoxazone 500 mg Q 6 hours p r n  Myofascial pain    The patient reports that this regimen is providing mild pain relief  The patient is reporting no side effects from this pain medication regimen  I have personally reviewed and/or updated the patient's past medical history, past surgical history, family history, social history, current medications, allergies, and vital signs today  Review of Systems:    Review of Systems   Respiratory: Negative for shortness of breath  Cardiovascular: Negative for chest pain  Gastrointestinal: Positive for constipation  Negative for diarrhea, nausea and vomiting  Musculoskeletal: Negative for arthralgias, gait problem, joint swelling and myalgias  Skin: Negative for rash  Neurological: Positive for weakness  Negative for dizziness and seizures  All other systems reviewed and are negative  Past Medical History:   Diagnosis Date    Anemia     Anxiety     Depression     Foot drop     Bilateral    History of spinal cord injury     at L1    Mental disorder     Small bowel problem     Chronic Constipation    Urinary incontinence May 30 2013       Past Surgical History:   Procedure Laterality Date    BOTOX INJECTION N/A 7/29/2019    Procedure: INJECTION BOTULINUM TOXIN (BOTOX);   Surgeon: Alex Rudd MD;  [facility]: [facility];  Service: Urology    COLONOSCOPY      CYSTOSCOPY      Diagnostic; onset: 1/3/17    FL MYELOGRAM 2 OR MORE REGIONS  9/6/2018    [place] COLONOSCOPY FLX DX W/COLLJ SPEC WHEN PFRMD N/A 12/12/2017    Procedure: COLONOSCOPY;  Surgeon: Felice Rea MD; [facility];   Service: Gastroenterology    MD CYSTOURETHROSCOPY N/A 2019    Procedure: CYSTOSCOPY;  Surgeon: Mert Talbert MD;  Location:  [facility] OR;  Service: Urology    SPINAL FUSION      Lumbar       Family History   Problem Relation Age of Onset    Diabetes Father     Lymphoma Father         Waldenstrom's    Heart disease Father         cardiac disorder    Hypertension Father     Pulmonary embolism Father     Hyperlipidemia Father     Cancer Father     Clotting disorder Father     Heart attack Father     Stroke Family     Depression Mother     No Known Problems Sister     No Known Problems Brother     Stroke Maternal Grandmother        Social History     Occupational History    Not on file   Tobacco Use    Smoking status: Former Smoker     Packs/day: 0 70     Years: 1 00     Pack years: 0 70     Types: Cigarettes     Last attempt to quit: 2016     Years since quittin 6    Smokeless tobacco: Never Used   Substance and Sexual Activity    Alcohol use: Yes     Frequency: 2-4 times a month     Drinks per session: 1 or 2     Binge frequency: Never    Drug use: No    Sexual activity: Yes     Partners: Male     Birth control/protection: Coitus interruptus, IUD         Current Outpatient Medications:     busPIRone (BUSPAR) 10 mg tablet, Take 1 tablet by mouth 3 (three) times a day, Disp: , Rfl:     carBAMazepine (TEGretol) 200 mg tablet, Take 200 mg by mouth 2 (two) times a day  , Disp: , Rfl:     chlorzoxazone (PARAFON FORTE) 500 mg tablet, Take 1 5 tablets (750 mg total) by mouth every 8 (eight) hours as needed for muscle spasms, Disp: 135 tablet, Rfl: 3    hydrOXYzine HCL (ATARAX) 10 mg tablet, as needed, Disp: , Rfl:     levonorgestrel (MIRENA) 20 MCG/24HR IUD, 20 mcg/day, Disp: , Rfl:     Mirabegron ER (MYRBETRIQ) 50 MG TB24, Take 1 tablet (50 mg total) by mouth daily (Patient taking differently: Take 50 mg by mouth daily at bedtime ), Disp: 30 tablet, Rfl: 11    oxybutynin (DITROPAN XL) 15 MG 24 hr tablet, Take 1 tablet (15 mg total) by mouth daily (Patient taking differently: Take 15 mg by mouth daily at bedtime ), Disp: 30 tablet, Rfl: 1    sertraline (ZOLOFT) 100 mg tablet, Take 100 mg by mouth every morning, Disp: , Rfl: 0    Allergies   Allergen Reactions    Sulfamethoxazole Nausea Only and GI Intolerance       Physical Exam:    /66   Pulse 77   Ht 5' 4" (1 626 m)   Wt 58 1 kg (128 lb)   BMI 21 97 kg/m²     Constitutional:normal, well developed, well nourished, alert, in no distress and non-toxic and no overt pain behavior  Eyes:anicteric  HEENT:grossly intact  Neck:supple, symmetric, trachea midline and no masses   Pulmonary:even and unlabored  Cardiovascular:No edema or pitting edema present  Skin:Normal without rashes or lesions and well hydrated  Psychiatric:Mood and affect appropriate  Neurologic:Cranial Nerves II-XII grossly intact  Musculoskeletal:antalgic gait but steady with the use of a cane      Imaging  No orders to display         No orders of the defined types were placed in this encounter

## 2019-08-01 NOTE — PROGRESS NOTES
Daily Note     Today's date: 2019  Patient name: Maya Dozier  : 1985  MRN: [id number]  Referring provider: Gi López MD  Dx:   Encounter Diagnosis     ICD-10-CM    1  Acute right ankle pain M25 571    2  Mild sprain of right ankle, subsequent encounter S93 401D    3  Neurologic gait dysfunction R26 9          Subjective: Reports ankles feel pretty good today  Complains of upper back pain  States that it feels like it is in spasms  Objective: See treatment diary below    Short Term Goal Expiration Date:(19)  Long Term Goal Expiration Date: (19)  POC Expiration Date: (19)        Precautions high fall risk, hx of L1 SCI        Manual                                                                                          Exercise Diary              Nustep  L5 10 min           Ankle ABCs (lightlying into PF and inv)  1x           Seated HR/TR  2x10           Static balance FA level (**CGA** trunk collapses forward)  30''x3           STS from raised mat  2x10           Gastroc S  30''x3            soleus stretch 30''x3            supine ankle AROM Mod-Max A 2x10            small rockerboard Seated-2x10                                                                                                                                                                           Modalities                                                           Assessment: Patient demo significant ankle weakness  Unable to actively perform ankle DF, but can achieve passively  Observed ankle supination with attempt at [facility]  Patient able to perform STS from raised mat table without UE support  Patient demo some excessive trunk extension as compensation to complete static balance  Patient advised in TS recruitment and glute contraction  Noted improvement with emphasis on glute contraction  Patient denies any pain with exercises  She report some increased soreness post tx  Denied ice   Patient inquired about Vaccine Information Statement(s) was given today. This has been reviewed, questions answered, and verbal consent given by father for injection(s) and administration of Influenza (Inactivated).         therapy for her upper back, advised to speak with MD     Plan: Progress treatment as tolerated         Short Term Goal Expiration Date:(8/25/19)  Long Term Goal Expiration Date: (9/25/19)  POC Expiration Date: (9/25/19)

## 2019-08-01 NOTE — TELEPHONE ENCOUNTER
We will continue to monitor symptoms    If there is any further concern for an infectious process patient is to call the office

## 2019-08-01 NOTE — TELEPHONE ENCOUNTER
Call placed to patient  She states she is feeling much better today  Patient states she needs post op Botox appt for two weeks  Scheduled for 8/14 at [facility] office with Dr Carolina Dao

## 2019-08-05 ENCOUNTER — OFFICE VISIT (OUTPATIENT)
Dept: PHYSICAL THERAPY | Facility: CLINIC | Age: 34
End: 2019-08-05
Payer: COMMERCIAL

## 2019-08-05 DIAGNOSIS — R26.9 NEUROLOGIC GAIT DYSFUNCTION: ICD-10-CM

## 2019-08-05 DIAGNOSIS — M25.571 ACUTE RIGHT ANKLE PAIN: Primary | ICD-10-CM

## 2019-08-05 DIAGNOSIS — S93.401D MILD SPRAIN OF RIGHT ANKLE, SUBSEQUENT ENCOUNTER: ICD-10-CM

## 2019-08-05 PROCEDURE — 97112 NEUROMUSCULAR REEDUCATION: CPT | Performed by: PHYSICAL THERAPIST

## 2019-08-05 PROCEDURE — 97110 THERAPEUTIC EXERCISES: CPT | Performed by: PHYSICAL THERAPIST

## 2019-08-05 NOTE — PROGRESS NOTES
Daily Note     Today's date: 2019  Patient name: Vincent Donohue  : 1985  MRN: [id number]  Referring provider: Duane Naik MD  Dx:   Encounter Diagnosis     ICD-10-CM    1  Acute right ankle pain M25 571    2  Mild sprain of right ankle, subsequent encounter S93 401D    3  Neurologic gait dysfunction R26 9                   Subjective: States that ankle feels better today  Overall felt better last few days but this morning "felt like too much blood was going to the ankle" and felt discomfort  Objective: See treatment diary below      Assessment: Pt demo significant ankle weakness, R> L, due to hx of SCI however pt is able to initiate some movement with inversion and PF  Pt had some c/o medial R ankle pain during TE and states she was told that she had a "bone bruise" by Dr Keyes Cure  Significant difficulty completing STS without UE and pt relies excessively on hip strategy due to impaired ankle strength and sensation  Provided VC to use mirror for visual feedback during STS  Patient would benefit from continued PT      Plan: Progress treatment as tolerated         Short Term Goal Expiration Date:(19)  Long Term Goal Expiration Date: (19)  POC Expiration Date: (19)     Precautions high fall risk, hx of L1 SCI        Manual                                                                                          Exercise Diary            Nustep  L5 10 min  L5, 10 min          Ankle ABCs (lightly into PF and inv)  1x  circles CW/CCW 60" ea          Seated HR/TR  2x10  20x          Static balance FA level (**CGA** and // bars trunk collapses forward)  30''x3  30" x 3         STS from raised mat  2x10  2 foams on chair in front of // bar 2 x 10, mirror          E Gastroc S  30''x3  30" x 3          soleus stretch 30''x3            supine ankle AAROM Mod-Max A 2x10  mod-max A AAROM EV, PF, DF 2 x 10    AROM 2 x 10          small rockerboard Seated-2x10  round rockerboard seated 30" x 2 CW/CCW         Supine R SLR    2 x 15         Bridges    2 x 15                                                                                                                                              Modalities

## 2019-08-06 ENCOUNTER — TELEPHONE (OUTPATIENT)
Dept: PAIN MEDICINE | Facility: CLINIC | Age: 34
End: 2019-08-06

## 2019-08-06 DIAGNOSIS — M79.18 MYOFASCIAL PAIN: Primary | ICD-10-CM

## 2019-08-06 NOTE — TELEPHONE ENCOUNTER
PT order placed in patient's chart as requested  ----- Message from Phong Jimenez RN sent at 8/6/2019  1:06 PM EDT -----  Regarding: FW: Prescription Question  Contact: [phone]      ----- Message -----  From: Cheryle Heinrich  Sent: 8/6/2019  11:50 AM EDT  To: [facility]  Subject: Prescription Question                            Oscar Frederick,  We met the other day and spoke about physical therapy that included massage  I talked to my physical therapist and they said I would need another prescription specifically for the upper back pain and I would like for it to include a recommendation for massage  If you could put that in the system that would be great  Thanks a lot     Cheryle Heinrich

## 2019-08-06 NOTE — TELEPHONE ENCOUNTER
NITO patient and made aware referral has been place for physical therapy  Patient appreciative of call back

## 2019-08-08 ENCOUNTER — OFFICE VISIT (OUTPATIENT)
Dept: PHYSICAL THERAPY | Facility: CLINIC | Age: 34
End: 2019-08-08
Payer: COMMERCIAL

## 2019-08-08 DIAGNOSIS — S93.401D MILD SPRAIN OF RIGHT ANKLE, SUBSEQUENT ENCOUNTER: ICD-10-CM

## 2019-08-08 DIAGNOSIS — M25.571 ACUTE RIGHT ANKLE PAIN: Primary | ICD-10-CM

## 2019-08-08 DIAGNOSIS — R26.9 NEUROLOGIC GAIT DYSFUNCTION: ICD-10-CM

## 2019-08-08 PROCEDURE — 97112 NEUROMUSCULAR REEDUCATION: CPT | Performed by: PHYSICAL THERAPIST

## 2019-08-08 PROCEDURE — 97110 THERAPEUTIC EXERCISES: CPT | Performed by: PHYSICAL THERAPIST

## 2019-08-08 NOTE — PROGRESS NOTES
Daily Note     Today's date: 2019  Patient name: Yomi Dominguez  : 1985  MRN: [id number]  Referring provider: Oneida Bowen MD  Dx:   Encounter Diagnosis     ICD-10-CM    1  Acute right ankle pain M25 571    2  Neurologic gait dysfunction R26 9    3  Mild sprain of right ankle, subsequent encounter S93 401D                   Subjective: No new complaints  Still having some ankle pain  Objective: See treatment diary below      Assessment: Tolerated treatment well, no c/o pain with any of TE today  Improved balance and control getting up from a chair without UE and static balance this session, less excessive trunk swaying noted this session  Pt was able successfully get up from a chair without UE support and maintain her balance for 3 sec  Patient would benefit from continued PT      Plan: Progress treatment as tolerated  Potentially remove one foam from chair        Short Term Goal Expiration Date:(19)  Long Term Goal Expiration Date: (19)  POC Expiration Date: (19)     Precautions high fall risk, hx of L1 SCI        Manual                                                                                          Exercise Diary          Nustep  L5 10 min  L5, 10 min   L6, 10 min        Ankle ABCs (lightly into PF and inv)  1x  circles CW/CCW 60" ea   circles CW/CCW 60" ea        Seated HR/TR  2x10  20x   20x       Static balance FA level (**CGA** and // bars trunk collapses forward)  30''x3  30" x 3  30" x 3 UE prn        STS from raised mat  2x10  2 foams on chair in front of // bar 2 x 10, mirror    2 foams on chair in front of // bar 2 x 15, mirror        R Gastroc S  30''x3  30" x 3  30" x 3        soleus stretch 30''x3            supine ankle AAROM Supine or seated  Mod-Max A 2x10  mod-max A AAROM EV, PF, DF 2 x 10    AROM 2 x 10 mod-max A AAROM EV, PF, DF 2 x 10    AROM 2 x 10        small rockerboard Seated-2x10  round rockerboard seated 30" x 2 CW/CCW  round rockerboard seated 30" x 2 CW/CCW       Supine R SLR    2 x 15  2 x 15         Bridges    2 x 15   2 x 15                                                                                                                                           Modalities

## 2019-08-12 ENCOUNTER — OFFICE VISIT (OUTPATIENT)
Dept: PHYSICAL THERAPY | Facility: CLINIC | Age: 34
End: 2019-08-12
Payer: COMMERCIAL

## 2019-08-12 DIAGNOSIS — R26.9 NEUROLOGIC GAIT DYSFUNCTION: ICD-10-CM

## 2019-08-12 DIAGNOSIS — M25.571 ACUTE RIGHT ANKLE PAIN: Primary | ICD-10-CM

## 2019-08-12 DIAGNOSIS — S93.401D MILD SPRAIN OF RIGHT ANKLE, SUBSEQUENT ENCOUNTER: ICD-10-CM

## 2019-08-12 PROCEDURE — 97110 THERAPEUTIC EXERCISES: CPT

## 2019-08-12 PROCEDURE — 97112 NEUROMUSCULAR REEDUCATION: CPT

## 2019-08-12 NOTE — PROGRESS NOTES
Daily Note     Today's date: 2019  Patient name: Nuvia Taylor  : 1985  MRN: [id number]  Referring provider: Hari Hillman MD  Dx:   Encounter Diagnosis     ICD-10-CM    1  Acute right ankle pain M25 571    2  Neurologic gait dysfunction R26 9    3  Mild sprain of right ankle, subsequent encounter S93 401D             Subjective: Reports some mild soreness upon arrival         Objective: See treatment diary below      Assessment: Patient able to perform STS from chair with only 1 foam today  Patient continues to demo instability with static balance  Improved ability to perform AROM today  Plan: Progress treatment as tolerated         Short Term Goal Expiration Date:(19)  Long Term Goal Expiration Date: (19)  POC Expiration Date: (19)     Precautions high fall risk, hx of L1 SCI        Manual                                                                                          Exercise Diary        Nustep  L5 10 min  L5, 10 min   L6, 10 min   L5 10 min     Ankle ABCs (lightly into PF and inv)  1x  circles CW/CCW 60" ea   circles CW/CCW 60" ea   circles CW/CCW 60" ea      Seated HR/TR  2x10  20x   20x  20x     Static balance FA level (**CGA** and // bars trunk collapses forward)  30''x3  30" x 3  30" x 3 UE prn        STS from raised mat  2x10  2 foams on chair in front of // bar 2 x 10, mirror    2 foams on chair in front of // bar 2 x 15, mirror   1 foam on chair in front of // bar 2 x 10, mirror      R Gastroc S  30''x3  30" x 3  30" x 3  30''x3      soleus stretch 30''x3            supine ankle AAROM Supine or seated  Mod-Max A 2x10  mod-max A AAROM EV, PF, DF 2 x 10    AROM 2 x 10 mod-max A AAROM EV, PF, DF 2 x 10    AROM 2 x 10  AROM DF,PF, EV, 2x10      small rockerboard Seated-2x10  round rockerboard seated 30" x 2 CW/CCW  round rockerboard seated 30" x 2 CW/CCW     round rockerboard seated 30" x 2 CW/CCW        Supine R SLR    2 x 15  2 x 15    2x15   Bridges    2 x 15   2 x 15  2x15      step ups       4'', 10x ea                                                                                                                           Modalities

## 2019-08-14 ENCOUNTER — OFFICE VISIT (OUTPATIENT)
Dept: UROLOGY | Facility: AMBULATORY SURGERY CENTER | Age: 34
End: 2019-08-14
Payer: COMMERCIAL

## 2019-08-14 ENCOUNTER — APPOINTMENT (OUTPATIENT)
Dept: PHYSICAL THERAPY | Facility: CLINIC | Age: 34
End: 2019-08-14
Payer: COMMERCIAL

## 2019-08-14 VITALS
WEIGHT: 125 LBS | DIASTOLIC BLOOD PRESSURE: 70 MMHG | SYSTOLIC BLOOD PRESSURE: 110 MMHG | HEART RATE: 66 BPM | BODY MASS INDEX: 21.34 KG/M2 | HEIGHT: 64 IN

## 2019-08-14 DIAGNOSIS — N39.41 URGE INCONTINENCE: ICD-10-CM

## 2019-08-14 LAB — POST-VOID RESIDUAL VOLUME, ML POC: 0 ML

## 2019-08-14 PROCEDURE — 51798 US URINE CAPACITY MEASURE: CPT | Performed by: UROLOGY

## 2019-08-14 PROCEDURE — 99213 OFFICE O/P EST LOW 20 MIN: CPT | Performed by: UROLOGY

## 2019-08-14 RX ORDER — OXYBUTYNIN CHLORIDE 15 MG/1
15 TABLET, EXTENDED RELEASE ORAL DAILY
Qty: 90 TABLET | Refills: 3 | Status: SHIPPED | OUTPATIENT
Start: 2019-08-14 | End: 2020-03-19 | Stop reason: SDUPTHER

## 2019-08-14 NOTE — ASSESSMENT & PLAN NOTE
The patient symptoms are improved with 100 units of Botox  She still has a fair amount of urgency and urge incontinence  We discussed that we can increase the dosage of Botox at her next injection  She will follow up in 6 months

## 2019-08-14 NOTE — PROGRESS NOTES
Assessment/Plan:    Urge incontinence  The patient symptoms are improved with 100 units of Botox  She still has a fair amount of urgency and urge incontinence  We discussed that we can increase the dosage of Botox at her next injection  She will follow up in 6 months  Diagnoses and all orders for this visit:    Urge incontinence  -     oxybutynin (DITROPAN XL) 15 MG 24 hr tablet; Take 1 tablet (15 mg total) by mouth daily  -     POCT Measure PVR          Total visit time was 15 minutes of which over 50% was spent on counseling  Subjective:     Patient ID: Cheryle Heinrich is a 34 y o  female    61-year-old female presents for follow-up after cystoscopy and Botox injection  The patient has been doing well after the procedure  She reports substantial decrease in frequency  She still has some urgency and occasional urge incontinence  She still wearing pads  She is happy with the results  She has no other complaints  The following portions of the patient's history were reviewed and updated as appropriate: allergies, current medications, past family history, past medical history, past social history, past surgical history and problem list     Review of Systems   Constitutional: Negative  HENT: Negative  Eyes: Negative  Respiratory: Negative  Cardiovascular: Negative  Gastrointestinal: Negative  Endocrine: Negative  Genitourinary:        As noted per HPI   Musculoskeletal: Negative  Skin: Negative  Allergic/Immunologic: Negative  Neurological: Negative  Hematological: Negative  Psychiatric/Behavioral: Negative  Objective:    Physical Exam   Constitutional: She is oriented to person, place, and time  She appears well-developed and well-nourished  Ambulates with cane and braces   HENT:   Head: Normocephalic and atraumatic  Neck: Normal range of motion  Neck supple  Cardiovascular: Intact distal pulses     Pulmonary/Chest: Effort normal  Abdominal: Soft  Bowel sounds are normal  She exhibits no distension and no mass  There is no tenderness  There is no rebound and no guarding  Musculoskeletal: Normal range of motion  Neurological: She is alert and oriented to person, place, and time  Skin: Skin is warm and dry  Psychiatric: She has a normal mood and affect  Vitals reviewed          Results  No results found for: PSA  Lab Results   Component Value Date    GLUCOSE 92 12/28/2015    CALCIUM 8 7 06/24/2019     06/21/2017    K 4 0 06/24/2019    CO2 29 06/24/2019     06/24/2019    BUN 8 06/24/2019    CREATININE 0 45 (L) 06/24/2019     Lab Results   Component Value Date    WBC 3 9 03/06/2018    HGB 13 4 03/06/2018    HCT 39 3 03/06/2018    MCV 94 2 03/06/2018     09/06/2018       Recent Results (from the past 1 hour(s))   POCT Measure PVR    Collection Time: 08/14/19  8:49 AM   Result Value Ref Range    POST-VOID RESIDUAL VOLUME, ML POC 0 mL   ]

## 2019-08-17 ENCOUNTER — TELEPHONE (OUTPATIENT)
Dept: OTHER | Facility: OTHER | Age: 34
End: 2019-08-17

## 2019-08-19 ENCOUNTER — OFFICE VISIT (OUTPATIENT)
Dept: PHYSICAL THERAPY | Facility: CLINIC | Age: 34
End: 2019-08-19
Payer: COMMERCIAL

## 2019-08-19 DIAGNOSIS — S93.401D MILD SPRAIN OF RIGHT ANKLE, SUBSEQUENT ENCOUNTER: ICD-10-CM

## 2019-08-19 DIAGNOSIS — R26.9 NEUROLOGIC GAIT DYSFUNCTION: ICD-10-CM

## 2019-08-19 DIAGNOSIS — M25.571 ACUTE RIGHT ANKLE PAIN: Primary | ICD-10-CM

## 2019-08-19 PROCEDURE — 97112 NEUROMUSCULAR REEDUCATION: CPT

## 2019-08-19 PROCEDURE — 97110 THERAPEUTIC EXERCISES: CPT

## 2019-08-19 NOTE — PROGRESS NOTES
Daily Note     Today's date: 2019  Patient name: Lisa Saldana  : 1985  MRN: [id number]  Referring provider: Genevieve Hedrick MD  Dx:   Encounter Diagnosis     ICD-10-CM    1  Acute right ankle pain M25 571    2  Neurologic gait dysfunction R26 9    3  Mild sprain of right ankle, subsequent encounter S90 108D             Subjective: Reports some mild soreness upon arrival         Objective: See treatment diary below      Assessment: Patient demo some difficulty with eccentric control with STS  Also required cueing to avoid genu valgus  Able to increase to 6'' step today with step ups  Continues to be challenged with postural sway with static balance  Plan: Progress treatment as tolerated         Short Term Goal Expiration Date:(19)  Long Term Goal Expiration Date: (19)  POC Expiration Date: (19)     Precautions high fall risk, hx of L1 SCI        Manual                                                                                          Exercise Diary      Nustep  L5 10 min  L5, 10 min   L6, 10 min   L5 10 min  L5 10 min   Ankle ABCs (lightly into PF and inv)  1x  circles CW/CCW 60" ea   circles CW/CCW 60" ea   circles CW/CCW 60" ea   circles CW/CCW 60" ea    Seated HR/TR  2x10  20x   20x  20x  20x   Static balance FA level (**CGA** and // bars trunk collapses forward)  30''x3  30" x 3  30" x 3 UE prn     30''x3   STS from raised mat  2x10  2 foams on chair in front of // bar 2 x 10, mirror    2 foams on chair in front of // bar 2 x 15, mirror   1 foam on chair in front of // bar 2 x 10, mirror   1 foam on chair in front of // bar 2 x 10, mirror   R Gastroc S  30''x3  30" x 3  30" x 3  30''x3  30''x3    soleus stretch 30''x3            supine ankle AAROM Supine or seated  Mod-Max A 2x10  mod-max A AAROM EV, PF, DF 2 x 10    AROM 2 x 10 mod-max A AAROM EV, PF, DF 2 x 10    AROM 2 x 10  AROM DF,PF, EV, 2x10  AROM DF,PF, EV, 2x10    small rockerboard Seated-2x10  round rockerboard seated 30" x 2 CW/CCW  round rockerboard seated 30" x 2 CW/CCW     round rockerboard seated 30" x 2 CW/CCW     round rockerboard seated 30" x 2 CW/CCW   Supine R SLR    2 x 15  2 x 15    2x15  2x15   Bridges    2 x 15   2 x 15  2x15  2x15    step ups       4'', 10x ea  6'', 10x                                                                                                                         Modalities

## 2019-08-21 ENCOUNTER — OFFICE VISIT (OUTPATIENT)
Dept: FAMILY MEDICINE CLINIC | Facility: CLINIC | Age: 34
End: 2019-08-21
Payer: COMMERCIAL

## 2019-08-21 VITALS
BODY MASS INDEX: 21.66 KG/M2 | OXYGEN SATURATION: 98 % | RESPIRATION RATE: 18 BRPM | WEIGHT: 126.2 LBS | SYSTOLIC BLOOD PRESSURE: 116 MMHG | DIASTOLIC BLOOD PRESSURE: 72 MMHG | HEART RATE: 70 BPM | TEMPERATURE: 97 F

## 2019-08-21 DIAGNOSIS — R11.0 NAUSEA: ICD-10-CM

## 2019-08-21 DIAGNOSIS — R14.0 ABDOMINAL BLOATING WITH CRAMPS: Primary | ICD-10-CM

## 2019-08-21 DIAGNOSIS — R10.9 ABDOMINAL BLOATING WITH CRAMPS: Primary | ICD-10-CM

## 2019-08-21 DIAGNOSIS — R12 HEARTBURN: ICD-10-CM

## 2019-08-21 PROCEDURE — 99214 OFFICE O/P EST MOD 30 MIN: CPT | Performed by: FAMILY MEDICINE

## 2019-08-21 NOTE — PROGRESS NOTES
FAMILY PRACTICE OFFICE VISIT       NAME: Rufina Bonner  AGE: 34 y o  SEX: female       : 1985        MRN: [id number]    DATE: 2019  TIME: 1:16 PM    Assessment and Plan     Problem List Items Addressed This Visit        Other    Abdominal bloating with cramps - Primary    Relevant Orders    US abdomen complete    Ambulatory referral to Gastroenterology    Nausea    Relevant Orders    US abdomen complete    Ambulatory referral to Gastroenterology    Heartburn        37-year-old female presents with 6 to 9 month history of abdominal swelling, cramping, nausea, unclear etiology  I would like to go ahead and check celiac panel  I will also order abdominal ultrasound  Patient has been previously evaluated by Gastroenterology, Dr Webster I would like patient to return for further evaluation  She is agreeable  May take Zantac or Pepcid for heartburn as needed  Avoid triggers that may cause reflux symptoms  Denies blood in the stools  Would benefit from taking probiotics regularly  Also would benefit from avoiding gluten and dairy from the diet  She is agreeable with this  There are no Patient Instructions on file for this visit  I have spent 25 minutes with Patient  today in which greater than 50% of this time was spent in counseling/coordination of care regarding Diagnostic results, Prognosis, Risks and benefits of tx options, Intructions for management, Patient and family education, Importance of tx compliance, Risk factor reductions and Impressions  Chief Complaint     Chief Complaint   Patient presents with    Abdominal Pain     6 months cramping nause       History of Present Illness     HPI  Patient presents today to discuss 71 month history of abdominal bloating, nausea, cramping as well as gassy feeling  Patient states when she eats anything, she feels uncomfortable  She can tolerate rice and bland foods  She does get heartburn occasionally    Her triggers are coffee, alcohol, spicy foods  The symptoms are not constant they occur occasionally, on and off  Patient has tried Rolaids, Pepto-Bismol without relief  She does use probiotics however not regularly  She is mostly vegan  Will start school will study interior design, next Monday  Review of Systems   Review of Systems   Constitutional: Negative for activity change, appetite change and unexpected weight change  Respiratory: Negative for shortness of breath  Cardiovascular: Negative  Gastrointestinal: Positive for abdominal distention and nausea  Negative for blood in stool  Genitourinary: Negative for dysuria  Active Problem List     Patient Active Problem List   Diagnosis    Paronychia of toe of right foot    Constipation    Weight loss    H/O spinal cord injury    Chronic midline thoracic back pain    Low vitamin B12 level    Vegetarian diet    Injury to L1 level of spinal cord (HCC)    Bipolar affective disorder (HCC)    Lumbar radiculopathy    Myofascial pain    Left flank pain    Abnormal urinalysis    Urinary urgency    Chest tightness    Pounding heartbeat    Night sweats    Snoring    Memory changes    Urge incontinence    Neurogenic bladder    Sleep apnea, obstructive    Abdominal bloating with cramps    Nausea    Heartburn       Past Medical History:  Past Medical History:   Diagnosis Date    Anemia     Anxiety     Depression     Foot drop     Bilateral    History of spinal cord injury     at L1    Mental disorder     Small bowel problem     Chronic Constipation    Urinary incontinence May 30 2013       Past Surgical History:  Past Surgical History:   Procedure Laterality Date    BOTOX INJECTION N/A 7/29/2019    Procedure: INJECTION BOTULINUM TOXIN (BOTOX);   Surgeon: Jason Murillo MD;  [facility];  Service: Urology    COLONOSCOPY      CYSTOSCOPY      Diagnostic; onset: 1/3/17    FL MYELOGRAM 2 OR MORE REGIONS  9/6/2018    MD COLONOSCOPY FLX DX W/COLLJ SPEC WHEN PFRMD N/A 2017    Procedure: COLONOSCOPY;  Surgeon: Alina Chiu MD;  Location: [facility];   Service: Gastroenterology    OR CYSTOURETHROSCOPY N/A 2019    Procedure: CYSTOSCOPY;  Surgeon: Barrett Prescott MD;  Location: [facility];  Service: Urology    SPINAL FUSION      Lumbar       Family History:  Family History   Problem Relation Age of Onset    Diabetes Father     Lymphoma Father         Waldenstrom's    Heart disease Father         cardiac disorder    Hypertension Father     Pulmonary embolism Father     Hyperlipidemia Father     Cancer Father     Clotting disorder Father     Heart attack Father     Stroke Family     Depression Mother     No Known Problems Sister     No Known Problems Brother     Stroke Maternal Grandmother        Social History:  Social History     Socioeconomic History    Marital status: Single     Spouse name: Not on file    Number of children: Not on file    Years of education: Not on file    Highest education level: Not on file   Occupational History    Not on file   Social Needs    Financial resource strain: Not on file    Food insecurity:     Worry: Not on file     Inability: Not on file    Transportation needs:     Medical: Not on file     Non-medical: Not on file   Tobacco Use    Smoking status: Former Smoker     Packs/day: 0 70     Years: 1 00     Pack years: 0 70     Types: Cigarettes     Last attempt to quit: 2016     Years since quittin 6    Smokeless tobacco: Never Used   Substance and Sexual Activity    Alcohol use: Yes     Frequency: 2-4 times a month     Drinks per session: 1 or 2     Binge frequency: Never    Drug use: No    Sexual activity: Yes     Partners: Male     Birth control/protection: Coitus interruptus, IUD   Lifestyle    Physical activity:     Days per week: Not on file     Minutes per session: Not on file    Stress: Not on file   Relationships    Social connections:     Talks on phone: Not on file     Gets together: Not on file     Attends [employer] service: Not on file     Active member of club or organization: Not on file     Attends meetings of clubs or organizations: Not on file     Relationship status: Not on file    Intimate partner violence:     Fear of current or ex partner: Not on file     Emotionally abused: Not on file     Physically abused: Not on file     Forced sexual activity: Not on file   Other Topics Concern    Not on file   Social History Narrative    Not on file     I have reviewed the patient's medical history in detail; there are no changes to the history as noted in the electronic medical record  Objective     Vitals:    08/21/19 1002   BP: 116/72   Pulse: 70   Resp: 18   Temp: (!) 97 °F (36 1 °C)   SpO2: 98%     Wt Readings from Last 3 Encounters:   08/21/19 57 2 kg (126 lb 3 2 oz)   08/14/19 56 7 kg (125 lb)   08/01/19 58 1 kg (128 lb)       Physical Exam   Constitutional: She appears well-developed and well-nourished  HENT:   Head: Normocephalic and atraumatic  Mouth/Throat: Oropharynx is clear and moist    Eyes: Pupils are equal, round, and reactive to light  Conjunctivae and EOM are normal    Neck: Normal range of motion  Neck supple  No thyromegaly present  Cardiovascular: Normal rate, regular rhythm and normal heart sounds  Pulmonary/Chest: Effort normal and breath sounds normal    Abdominal: Soft  Bowel sounds are normal  She exhibits no distension  There is no tenderness  Musculoskeletal: Normal range of motion  Lymphadenopathy:     She has no cervical adenopathy  Neurological: She is alert  Psychiatric: She has a normal mood and affect  Nursing note and vitals reviewed        Pertinent Laboratory/Diagnostic Studies:  Lab Results   Component Value Date    GLUCOSE 92 12/28/2015    BUN 8 06/24/2019    CREATININE 0 45 (L) 06/24/2019    CALCIUM 8 7 06/24/2019     06/21/2017    K 4 0 06/24/2019    CO2 29 06/24/2019     06/24/2019     Lab Results   Component Value Date    ALT 19 06/24/2019    AST 13 06/24/2019    ALKPHOS 67 06/24/2019    BILITOT 0 5 06/21/2017       Lab Results   Component Value Date    WBC 3 9 03/06/2018    HGB 13 4 03/06/2018    HCT 39 3 03/06/2018    MCV 94 2 03/06/2018     09/06/2018       No results found for: TSH    Lab Results   Component Value Date    CHOL 181 08/23/2014     Lab Results   Component Value Date    TRIG 106 06/24/2019     Lab Results   Component Value Date    HDL 68 (H) 06/24/2019     Lab Results   Component Value Date    LDLCALC 87 06/24/2019     Lab Results   Component Value Date    HGBA1C 4 8 02/01/2017       Results for orders placed or performed in visit on 08/14/19   POCT Measure PVR   Result Value Ref Range    POST-VOID RESIDUAL VOLUME, ML POC 0 mL       Orders Placed This Encounter   Procedures    US abdomen complete    Ambulatory referral to Gastroenterology       ALLERGIES:  Allergies   Allergen Reactions    Sulfamethoxazole Nausea Only and GI Intolerance       Current Medications     Current Outpatient Medications   Medication Sig Dispense Refill    busPIRone (BUSPAR) 10 mg tablet Take 1 tablet by mouth 3 (three) times a day      carBAMazepine (TEGretol) 200 mg tablet Take 200 mg by mouth 2 (two) times a day        chlorzoxazone (PARAFON FORTE) 500 mg tablet Take 1 5 tablets (750 mg total) by mouth every 8 (eight) hours as needed for muscle spasms 135 tablet 3    hydrOXYzine HCL (ATARAX) 10 mg tablet as needed      levonorgestrel (MIRENA) 20 MCG/24HR IUD 20 mcg/day      Mirabegron ER (MYRBETRIQ) 50 MG TB24 Take 1 tablet (50 mg total) by mouth daily (Patient taking differently: Take 50 mg by mouth daily at bedtime ) 30 tablet 11    oxybutynin (DITROPAN XL) 15 MG 24 hr tablet Take 1 tablet (15 mg total) by mouth daily 90 tablet 3    sertraline (ZOLOFT) 100 mg tablet Take 100 mg by mouth every morning  0     No current facility-administered medications for this visit            Health Maintenance     Health Maintenance   Topic Date Due    SLP PLAN OF CARE  05/29/2019    INFLUENZA VACCINE  07/01/2019    PT PLAN OF CARE  09/21/2019    BMI: Adult  08/21/2020    PAP SMEAR  10/29/2020    CRC Screening: Colonoscopy  12/12/2022    DTaP,Tdap,and Td Vaccines (2 - Td) 04/19/2026    Pneumococcal Vaccine: 65+ Years (1 of 2 - PCV13) 10/13/2052    Pneumococcal Vaccine: Pediatrics (0 to 5 Years) and At-Risk Patients (6 to 59 Years)  Aged Out    HEPATITIS B VACCINES  Aged [employer] History   Administered Date(s) Administered    INFLUENZA 11/01/2015, 03/01/2017, 01/01/2018, 11/01/2018    Influenza Quadrivalent Preservative Free 3 years and older IM 01/18/2017, 10/03/2017    Influenza TIV (IM) 11/12/2015    Influenza, injectable, quadrivalent, preservative free 0 5 mL 11/01/2018    Tdap 04/19/2016       Brianna Prince MD

## 2019-08-22 ENCOUNTER — EVALUATION (OUTPATIENT)
Dept: PHYSICAL THERAPY | Facility: CLINIC | Age: 34
End: 2019-08-22
Payer: COMMERCIAL

## 2019-08-22 DIAGNOSIS — M25.571 ACUTE RIGHT ANKLE PAIN: ICD-10-CM

## 2019-08-22 DIAGNOSIS — R26.9 NEUROLOGIC GAIT DYSFUNCTION: ICD-10-CM

## 2019-08-22 DIAGNOSIS — M79.18 MYOFASCIAL PAIN: Primary | ICD-10-CM

## 2019-08-22 DIAGNOSIS — S93.401D MILD SPRAIN OF RIGHT ANKLE, SUBSEQUENT ENCOUNTER: ICD-10-CM

## 2019-08-22 PROCEDURE — 97112 NEUROMUSCULAR REEDUCATION: CPT | Performed by: PHYSICAL THERAPIST

## 2019-08-22 PROCEDURE — 97110 THERAPEUTIC EXERCISES: CPT | Performed by: PHYSICAL THERAPIST

## 2019-08-22 PROCEDURE — 97014 ELECTRIC STIMULATION THERAPY: CPT | Performed by: PHYSICAL THERAPIST

## 2019-08-22 NOTE — PROGRESS NOTES
[facility]ian[facility]    Today's date: 2019  Patient name: Cheryle Heinrich  : 1985  MRN: [id number]  Referring provider: Padmini Rodriguez MD  Dx:   Encounter Diagnosis     ICD-10-CM    1  Acute right ankle pain M25 571    2  Neurologic gait dysfunction R26 9    3  Mild sprain of right ankle, subsequent encounter S93 401D                   Subjective: States that she is still having a tiny bit of pain in R ankle right now but more of a nerve pain than actual ankle  Average 1/10 R ankle pain and no longer using WC  Feels like PT has helped, thinks she also has more balance  Has not had any new falls  Has upper thoracic pain between shoulders blades  Avg 5/10, describes as a ache and crushing  Aggravated by increased activity such as walking and forward flexed posture when doing puzzles  Easing factors include heat and yoga  Started about 2 years ago after SCI  Pain is constant  Had PT a few times that only helped a little  Also had pool therapy which did not help  Per pt, pt states that pain doctor said it is related to spasms  Pt also "blacked out" when she was younger when she landed on her head doing a flip on trampoline, states she is not sure if that is related to her current pain  Session limited due to pt requesting to leave 10 min early for another appointment  Objective: See treatment diary below    Cervical AROM + OP: WNL except some pain during cervical extension OP  Shoulder AROM + OP: WNL     Lower trap: 4/5 bilaterally  Mid trap: 5/5 bilaterally   UT: 5/5 bilaterally   Rhomboids: 5/5 bilaterally     No ttp but reported some tenderness along T1-T6 paraspinals  Thoracic mobility: hypermobile, reported decreased pain with central PA to T1-T6  Posture: decreased thoracic kyphosis, rounded shoulders    Assessment: Progress note performed this session and pt reported significant improvement in ankle pain  Pt demo improved gait speed and balance per 5 x STS and TUG   Pt also presented with new script for myofascial pain in upper thoracic region  During assessment, pt is hyperreflexia and demo decreased thoracic kyphosis with report of constant nonspecific upper thoracic pain in between scapulas with some tenderness reported in rhomboids  Pt also demo some weakness of lower trap  At this time, Patient would benefit from continued PT for 4 more weeks for gait and balance as well as management of upper thoracic pain  Will d/c ankle goals at this time  Plan: Progress treatment as tolerated  Goals  ST weeks  1  Report R ankle pain < 3/10 avg - MET  2  WBAT with LRAD around house and not using WC anymore - MET  3  Independent with HEP - MET    LT weeks  1  Independent with updated HEP  - progressing  2  Report no pain with WB in R ankle - progressing  3  5 x STS with no LOB - progressing   4  Pt will be able to maintain balance FA EO level floor up to 15 sec without UE support - progressing     New goals  1  Report <3/10 upper thoracic pain within 4 weeks  2   Independent with HEP for upper back within 4 weeks        Balance Test     6 Minute Walk Test (ft):     2 Minute Walk Test (ft):     Gait Speed (ft/s): : 0 58 m/s with SPC, : 0 83 m/s with SPC   5x Sit To Stand (s): : 30 sec (frequent posterior LOB, or additional stepping forward required to maintain balance), : 12 sec with multiple  posterior LOBs and 1 anterior LOB   TU/25: 12 sec with SPC, : 10 16 sec with SPC            MCTSIB Number of Seconds   Feet Together, Eyes Open : 3 (trunk collapses forward, sometimes posteriorly), : 4    Feet Together, Eyes Closed NT   Feet Together, Eyes Open Foam NT   Feet Together, Eyes Closed Foam NT          Short Term Goal Expiration Date:(19)  Long Term Goal Expiration Date: (19)  POC Expiration Date: (19)     Precautions high fall risk, hx of L1 SCI        Manual              Upper thoracic PA mobs (T1-T6), grade III             STM                                                             Exercise Diary  8/22 8/12 8/19   Nustep L5, 10 min LE only    L5 10 min  L5 10 min   Ankle ABCs (lightly into PF and inv)     circles CW/CCW 60" ea   circles CW/CCW 60" ea    Seated HR/TR     20x  20x   Static balance FA level (**CGA** and // bars trunk collapses forward)       30''x3   STS from raised mat 10x    1 foam on chair in front of // bar 2 x 10, mirror   1 foam on chair in front of // bar 2 x 10, mirror   R Gastroc S     30''x3  30''x3    soleus stretch           supine ankle AAROM Supine or seated      AROM DF,PF, EV, 2x10  AROM DF,PF, EV, 2x10    small rockerboard        round rockerboard seated 30" x 2 CW/CCW     round rockerboard seated 30" x 2 CW/CCW   Supine R SLR     2x15  2x15   Bridges     2x15  2x15    step ups    4'', 10x ea  6'', 10x                         Pec S          Prayer S             Prone Y's                                                              Modalities 8/22            MHP + stim stim only rhomboids 5 min

## 2019-08-23 ENCOUNTER — TELEPHONE (OUTPATIENT)
Dept: FAMILY MEDICINE CLINIC | Facility: CLINIC | Age: 34
End: 2019-08-23

## 2019-08-23 PROBLEM — R10.9 ABDOMINAL BLOATING WITH CRAMPS: Status: ACTIVE | Noted: 2019-08-23

## 2019-08-23 PROBLEM — R12 HEARTBURN: Status: ACTIVE | Noted: 2019-08-23

## 2019-08-23 PROBLEM — R11.0 NAUSEA: Status: ACTIVE | Noted: 2019-08-23

## 2019-08-23 PROBLEM — R14.0 ABDOMINAL BLOATING WITH CRAMPS: Status: ACTIVE | Noted: 2019-08-23

## 2019-08-23 NOTE — TELEPHONE ENCOUNTER
Can you please let patient know that I have ordered a blood test for her to have done called celiac panel  This does not need to be fasting  I have printed this out    Thank you

## 2019-08-26 ENCOUNTER — OFFICE VISIT (OUTPATIENT)
Dept: PHYSICAL THERAPY | Facility: CLINIC | Age: 34
End: 2019-08-26
Payer: COMMERCIAL

## 2019-08-26 DIAGNOSIS — S93.401D MILD SPRAIN OF RIGHT ANKLE, SUBSEQUENT ENCOUNTER: ICD-10-CM

## 2019-08-26 DIAGNOSIS — R26.9 NEUROLOGIC GAIT DYSFUNCTION: ICD-10-CM

## 2019-08-26 DIAGNOSIS — M25.571 ACUTE RIGHT ANKLE PAIN: Primary | ICD-10-CM

## 2019-08-26 DIAGNOSIS — M79.18 MYOFASCIAL PAIN: ICD-10-CM

## 2019-08-26 PROCEDURE — 97110 THERAPEUTIC EXERCISES: CPT

## 2019-08-26 PROCEDURE — 97112 NEUROMUSCULAR REEDUCATION: CPT

## 2019-08-26 NOTE — PROGRESS NOTES
Daily Note  Today's date: 2019  Patient name: Leslie Huffman  : 1985  MRN: [id number]  Referring provider: Hernesto Donnelly MD  Dx:   Encounter Diagnosis     ICD-10-CM    1  Acute right ankle pain M25 571    2  Neurologic gait dysfunction R26 9    3  Mild sprain of right ankle, subsequent encounter S93 401D    4  Myofascial pain M79 18        Subjective: Offers no new complaints  Objective: See treatment diary below      Assessment: Added postural strengthening exercises today  Patient reported some discomfort  Did require cueing to avoid UT compensation  Patient was able to perform STS from chair without any additional foam today           Plan: Progress treatment as tolerated            Short Term Goal Expiration Date:(19)  Long Term Goal Expiration Date: (19)  POC Expiration Date: (19)     Precautions high fall risk, hx of L1 SCI        Manual              Upper thoracic PA mobs (T1-T6), grade III             STM   [facility]                                                         Exercise Diary        Nustep L5, 10 min LE only L5 10 min                      Static balance FA level (**CGA** and // bars trunk collapses forward)  30''x2      STS from raised mat 10x Chair, 10x                                                       step ups                          Pec S        Prayer S   10''x10       Prone Y's    I,Y,T, pball, 2x10        TB shoulder ext    seated, OTB, 2x10        TB rows    seated, OTB, 2x10        TB ER    seated, OTB, 2x10             Modalities             MHP + stim stim only rhomboids 5 min

## 2019-08-29 ENCOUNTER — APPOINTMENT (OUTPATIENT)
Dept: PHYSICAL THERAPY | Facility: CLINIC | Age: 34
End: 2019-08-29
Payer: COMMERCIAL

## 2019-09-06 ENCOUNTER — TELEPHONE (OUTPATIENT)
Dept: UROLOGY | Facility: MEDICAL CENTER | Age: 34
End: 2019-09-06

## 2019-09-06 DIAGNOSIS — N39.0 URINARY TRACT INFECTION WITHOUT HEMATURIA, SITE UNSPECIFIED: Primary | ICD-10-CM

## 2019-09-06 NOTE — TELEPHONE ENCOUNTER
Patient of Dr Wharton Felt with history of urge incontinence  Patient stated that she feels like she has a UTI  She has been voiding more frequently in small amounts the past 2 days with incontinence  She denies fever or chills    Order placed for ua, c/s

## 2019-09-06 NOTE — TELEPHONE ENCOUNTER
Patient of Dr Caroline Arias seen in [place] office  Patient believes she has a uti  Patient is having cloudy urine with increased frequency and urgency  Please advise

## 2019-09-07 ENCOUNTER — TRANSCRIBE ORDERS (OUTPATIENT)
Dept: LAB | Facility: HOSPITAL | Age: 34
End: 2019-09-07

## 2019-09-07 ENCOUNTER — APPOINTMENT (OUTPATIENT)
Dept: LAB | Facility: HOSPITAL | Age: 34
End: 2019-09-07
Payer: COMMERCIAL

## 2019-09-07 DIAGNOSIS — Z79.899 ENCOUNTER FOR LONG-TERM (CURRENT) USE OF MEDICATIONS: ICD-10-CM

## 2019-09-07 DIAGNOSIS — R14.0 ABDOMINAL BLOATING WITH CRAMPS: ICD-10-CM

## 2019-09-07 DIAGNOSIS — R00.2 POUNDING HEARTBEAT: ICD-10-CM

## 2019-09-07 DIAGNOSIS — R41.3 MEMORY CHANGES: ICD-10-CM

## 2019-09-07 DIAGNOSIS — R11.0 NAUSEA: ICD-10-CM

## 2019-09-07 DIAGNOSIS — R10.9 ABDOMINAL BLOATING WITH CRAMPS: ICD-10-CM

## 2019-09-07 DIAGNOSIS — R07.89 CHEST TIGHTNESS: ICD-10-CM

## 2019-09-07 DIAGNOSIS — F31.81 BIPOLAR II DISORDER (HCC): ICD-10-CM

## 2019-09-07 DIAGNOSIS — R61 NIGHT SWEATS: ICD-10-CM

## 2019-09-07 DIAGNOSIS — N39.0 URINARY TRACT INFECTION WITHOUT HEMATURIA, SITE UNSPECIFIED: ICD-10-CM

## 2019-09-07 DIAGNOSIS — F31.81 BIPOLAR II DISORDER (HCC): Primary | ICD-10-CM

## 2019-09-07 LAB
BACTERIA UR QL AUTO: ABNORMAL /HPF
BASOPHILS # BLD AUTO: 0.03 THOUSANDS/ΜL (ref 0–0.1)
BASOPHILS NFR BLD AUTO: 1 % (ref 0–1)
BILIRUB UR QL STRIP: NEGATIVE
CARBAMAZEPINE SERPL-MCNC: 8.6 UG/ML (ref 4–12)
CLARITY UR: ABNORMAL
COLOR UR: YELLOW
EOSINOPHIL # BLD AUTO: 0.06 THOUSAND/ΜL (ref 0–0.61)
EOSINOPHIL NFR BLD AUTO: 2 % (ref 0–6)
ERYTHROCYTE [DISTWIDTH] IN BLOOD BY AUTOMATED COUNT: 11.8 % (ref 11.6–15.1)
GLUCOSE UR STRIP-MCNC: NEGATIVE MG/DL
HCT VFR BLD AUTO: 40.4 % (ref 34.8–46.1)
HGB BLD-MCNC: 13.7 G/DL (ref 11.5–15.4)
HGB UR QL STRIP.AUTO: ABNORMAL
HYALINE CASTS #/AREA URNS LPF: ABNORMAL /LPF
IMM GRANULOCYTES # BLD AUTO: 0.01 THOUSAND/UL (ref 0–0.2)
IMM GRANULOCYTES NFR BLD AUTO: 0 % (ref 0–2)
KETONES UR STRIP-MCNC: NEGATIVE MG/DL
LEUKOCYTE ESTERASE UR QL STRIP: ABNORMAL
LYMPHOCYTES # BLD AUTO: 1.09 THOUSANDS/ΜL (ref 0.6–4.47)
LYMPHOCYTES NFR BLD AUTO: 30 % (ref 14–44)
MCH RBC QN AUTO: 32.6 PG (ref 26.8–34.3)
MCHC RBC AUTO-ENTMCNC: 33.9 G/DL (ref 31.4–37.4)
MCV RBC AUTO: 96 FL (ref 82–98)
MONOCYTES # BLD AUTO: 0.32 THOUSAND/ΜL (ref 0.17–1.22)
MONOCYTES NFR BLD AUTO: 9 % (ref 4–12)
NEUTROPHILS # BLD AUTO: 2.17 THOUSANDS/ΜL (ref 1.85–7.62)
NEUTS SEG NFR BLD AUTO: 58 % (ref 43–75)
NITRITE UR QL STRIP: POSITIVE
NON-SQ EPI CELLS URNS QL MICRO: ABNORMAL /HPF
NRBC BLD AUTO-RTO: 0 /100 WBCS
PH UR STRIP.AUTO: 7.5 [PH]
PLATELET # BLD AUTO: 213 THOUSANDS/UL (ref 149–390)
PMV BLD AUTO: 9.2 FL (ref 8.9–12.7)
PROT UR STRIP-MCNC: NEGATIVE MG/DL
RBC # BLD AUTO: 4.2 MILLION/UL (ref 3.81–5.12)
RBC #/AREA URNS AUTO: ABNORMAL /HPF
SP GR UR STRIP.AUTO: 1.01 (ref 1–1.03)
TSH SERPL DL<=0.05 MIU/L-ACNC: 1.58 UIU/ML (ref 0.36–3.74)
UROBILINOGEN UR QL STRIP.AUTO: 0.2 E.U./DL
WBC # BLD AUTO: 3.68 THOUSAND/UL (ref 4.31–10.16)
WBC #/AREA URNS AUTO: ABNORMAL /HPF

## 2019-09-07 PROCEDURE — 87086 URINE CULTURE/COLONY COUNT: CPT

## 2019-09-07 PROCEDURE — 83516 IMMUNOASSAY NONANTIBODY: CPT

## 2019-09-07 PROCEDURE — 82784 ASSAY IGA/IGD/IGG/IGM EACH: CPT

## 2019-09-07 PROCEDURE — 36415 COLL VENOUS BLD VENIPUNCTURE: CPT

## 2019-09-07 PROCEDURE — 80156 ASSAY CARBAMAZEPINE TOTAL: CPT

## 2019-09-07 PROCEDURE — 87186 SC STD MICRODIL/AGAR DIL: CPT

## 2019-09-07 PROCEDURE — 86255 FLUORESCENT ANTIBODY SCREEN: CPT

## 2019-09-07 PROCEDURE — 87077 CULTURE AEROBIC IDENTIFY: CPT

## 2019-09-07 PROCEDURE — 84443 ASSAY THYROID STIM HORMONE: CPT

## 2019-09-07 PROCEDURE — 85025 COMPLETE CBC W/AUTO DIFF WBC: CPT

## 2019-09-07 PROCEDURE — 81001 URINALYSIS AUTO W/SCOPE: CPT

## 2019-09-09 ENCOUNTER — TELEPHONE (OUTPATIENT)
Dept: UROLOGY | Facility: AMBULATORY SURGERY CENTER | Age: 34
End: 2019-09-09

## 2019-09-09 DIAGNOSIS — N39.0 URINARY TRACT INFECTION WITHOUT HEMATURIA, SITE UNSPECIFIED: Primary | ICD-10-CM

## 2019-09-09 LAB
BACTERIA UR CULT: ABNORMAL
ENDOMYSIUM IGA SER QL: NEGATIVE
GLIADIN PEPTIDE IGA SER-ACNC: 3 UNITS (ref 0–19)
GLIADIN PEPTIDE IGG SER-ACNC: 2 UNITS (ref 0–19)
IGA SERPL-MCNC: 149 MG/DL (ref 87–352)
TTG IGA SER-ACNC: <2 U/ML (ref 0–3)
TTG IGG SER-ACNC: <2 U/ML (ref 0–5)

## 2019-09-09 RX ORDER — NITROFURANTOIN 25; 75 MG/1; MG/1
100 CAPSULE ORAL 2 TIMES DAILY
Qty: 10 CAPSULE | Refills: 0 | Status: SHIPPED | OUTPATIENT
Start: 2019-09-09 | End: 2019-09-14

## 2019-09-09 NOTE — TELEPHONE ENCOUNTER
----- Message from Iveth Coulter PA-C sent at 9/9/2019  9:04 AM EDT -----  I have sent a prescription of nitrofurantoin to the patient's preferred pharmacy  This is based on her positive culture from June of this year  I will continue to monitor for sensitivities and adjust antibiotic if needed

## 2019-09-09 NOTE — TELEPHONE ENCOUNTER
Call placed to patient and advised of above  Will call patient back if medication needs to be changed  Patient verbalized understanding

## 2019-09-10 NOTE — PROGRESS NOTES
Pt has not been seen since 8/26/19 and pt has not returned any phone calls for scheduling  At this time pt will be d/c from skilled PT as a self discharge

## 2019-09-12 ENCOUNTER — TELEPHONE (OUTPATIENT)
Dept: FAMILY MEDICINE CLINIC | Facility: CLINIC | Age: 34
End: 2019-09-12

## 2019-09-12 NOTE — TELEPHONE ENCOUNTER
----- Message from Hector Tapia MD sent at 9/12/2019  4:28 PM EDT -----  Please contact patient  Her thyroid test was normal   Testing for celiac disease was negative    Thank you

## 2019-09-16 RX ORDER — OXYBUTYNIN CHLORIDE 15 MG/1
TABLET, EXTENDED RELEASE ORAL
Qty: 30 TABLET | Refills: 1 | OUTPATIENT
Start: 2019-09-16

## 2019-09-25 ENCOUNTER — TELEPHONE (OUTPATIENT)
Dept: FAMILY MEDICINE CLINIC | Facility: CLINIC | Age: 34
End: 2019-09-25

## 2019-09-25 NOTE — TELEPHONE ENCOUNTER
Regarding: FW: Non-Urgent Medical Question  Contact: [phone]  Can you please help me with this? Thank you  ----- Message -----  From: Denise Atwood MA  Sent: 9/23/2019   1:24 PM EDT  To: Alessandro Cee MD  Subject: Non-Urgent Medical Question                      ----- Message from Libia Pritchard sent at 9/23/2019  1:24 PM EDT -----       ----- Message from Sarah Linares to Alessandro Cee MD sent at 9/23/2019 10:35 AM -----   Gregory Moore in school this semester at [employer] and working on getting disability accommodations  I need a letter from my doctor stating a need for flexible attendance due to pain flare ups     Thanks,  [facility]

## 2019-11-12 ENCOUNTER — TELEPHONE (OUTPATIENT)
Dept: RADIOLOGY | Facility: CLINIC | Age: 34
End: 2019-11-12

## 2019-11-12 NOTE — TELEPHONE ENCOUNTER
Elysia Damon - Appointment canceled More Detail >>   Appointment canceled   Yaw Phelps   Sent:   6:17 PM   To: [facility]   MRN: [id number] : 1985   Pt Home: [phone]     Entered: [phone]        Message     Appointment canceled for Yaw Phelps ([id number])   Visit Type: OFFICE VISIT SHORT [facility]   Date        Time      Length    Provider                  Department   2019   1:00 PM  15 mins  EUSEBIA Nava       [facility] [place]      Reason for Cancellation: Patient

## 2020-01-13 ENCOUNTER — TELEPHONE (OUTPATIENT)
Dept: UROLOGY | Facility: MEDICAL CENTER | Age: 35
End: 2020-01-13

## 2020-01-13 NOTE — TELEPHONE ENCOUNTER
I left a message at [phone] for patient to return call to update insurance so medication can be ordered

## 2020-01-13 NOTE — TELEPHONE ENCOUNTER
Patient of Dr Ross Mariano seen in [place]  Patient left message with answering service on Saturday, 20, as follows:    Message # [phone]         2020 04:27p   [MR]  TO:  [facility]  CALLER:  Debra Christensen  CALLER TELE #:  [phone] Ext  HOSP NAME:  ----------------------------------------------------------------------  :MADI  Pt Name:DYAN WRIGHT  :[birth year]  Emerg?:N  Msg:UNABLE TO GET MYREETRIQ FILLED DUE TO INSURANCE  NOT BEING ABLE TO AUTHORIZE IT, AND WANTS TO CONFIRM THAT THE INSURANCE ID# IS UPDATED PROPERLY

## 2020-01-14 NOTE — TELEPHONE ENCOUNTER
Left message today at [phone] and [phone] requesting a return phone call with updated insurance information

## 2020-01-29 NOTE — TELEPHONE ENCOUNTER
Patient left a message on the Medication Refill voice mail line stating she is in need of a refill prior to her upcoming office visit  She's calling with her new/current pharmacy benefit information because the medication requires prior authorization:  ID#:  [id number] - BIN#:  [id number] - N#:  [id number]  Any questions, please call her  Patient is seen by Dr Parag Michelle in the [facility]  Message forwarded to Nancy Sebastian in Prior Authorization

## 2020-01-30 ENCOUNTER — TELEPHONE (OUTPATIENT)
Dept: UROLOGY | Facility: AMBULATORY SURGERY CENTER | Age: 35
End: 2020-01-30

## 2020-01-30 NOTE — TELEPHONE ENCOUNTER
Mobile number did not work  on [phone] that we need to cancel appointment on 2/18  Per Dr Shireen Lujan to ask her when she calls back of  She needs repeat Botox if so 220uints with urine culture 2 weeks prior to surgery date

## 2020-02-04 ENCOUNTER — TELEPHONE (OUTPATIENT)
Dept: UROLOGY | Facility: AMBULATORY SURGERY CENTER | Age: 35
End: 2020-02-04

## 2020-02-04 NOTE — TELEPHONE ENCOUNTER
I spoke with the pharmacy service department and resubmitted clinicals with more information regarding what she has tried in the past  This is a new plan which I was not aware of and since the PA dept is backed up with the authorizations they are allowing her to get a supply of 15 days worth until they review this case  Please inform the patient a temporary Malik Parker code is #[id number] and she can give that code to the pharmacy to get her 15 days worth until I hear from the insurance    Thanks  Anatoliy Swanson

## 2020-02-04 NOTE — TELEPHONE ENCOUNTER
Patient managed by Dr Daja Coe seen in [place] with history of urge incontinence with cystoscopy and Botox injection  As per Booker Lopez prescription is denied with the insurance and patient needs to try formulary alternatives: Tolterodine  Tolterodine ER  Trospium  Solifenacin    Routing to provider to review and advise

## 2020-02-04 NOTE — TELEPHONE ENCOUNTER
Please clarify, is patient also on oxybutynin as well? Patient has been on Myrbetriq since 2015, why is it now being denied? She had previously tried Toviaz and VESIcare, but both medications were ineffective

## 2020-02-04 NOTE — TELEPHONE ENCOUNTER
Spoke with Patient and informed her that we were not aware of the new insurance plan  Ankit Johnston had spoken with with the pharmacy service department and resubmitted clinicals with more information regarding what she has tried in the past     Reported to Patient that since the PA department is backed up with the authorizations they are allowing her to get a supply of 15 days worth until they review this case  Informed the patient the temporary Darshan Rojas code is #[id number] and she can give that code to the pharmacy to get her 15 days worth until Ankit Johnston hears from the insurance  Encouraged her to call the pharmacy to enquire whether prescription was needed for the 15 days or only the code and to call our office should prescription be necessary  Patient repeats back understanding and agrees with plan  Knows to call office in the interim with any questions or concerns

## 2020-02-04 NOTE — TELEPHONE ENCOUNTER
Myrbetriq is denied with the insurance patient needs to try formulary alternatives:   Tolterodine  Tolterodine ER  Trospium  Solifenacin    Thanks  Joel Doyle

## 2020-02-10 NOTE — TELEPHONE ENCOUNTER
Myrbetriq is authorized with the insurance good for 34 for 34 days from 2/4/2020-8/4/2020, I will inform the patient    Thanks  Valery Borrego

## 2020-03-08 DIAGNOSIS — N32.81 OAB (OVERACTIVE BLADDER): ICD-10-CM

## 2020-03-08 RX ORDER — MIRABEGRON 50 MG/1
TABLET, FILM COATED, EXTENDED RELEASE ORAL
Qty: 30 TABLET | Refills: 11 | Status: SHIPPED | OUTPATIENT
Start: 2020-03-08 | End: 2021-02-04

## 2020-03-11 ENCOUNTER — TELEPHONE (OUTPATIENT)
Dept: FAMILY MEDICINE CLINIC | Facility: CLINIC | Age: 35
End: 2020-03-11

## 2020-03-18 DIAGNOSIS — N39.41 URGE INCONTINENCE: ICD-10-CM

## 2020-03-18 NOTE — TELEPHONE ENCOUNTER
Patient left a message on the Medication Refill voice mail line requesting a new prescription for oxybutynin 15 mg sent to [facility] with multiple refills  Patient appointment has been changed

## 2020-03-19 RX ORDER — OXYBUTYNIN CHLORIDE 15 MG/1
15 TABLET, EXTENDED RELEASE ORAL DAILY
Qty: 90 TABLET | Refills: 0 | Status: SHIPPED | OUTPATIENT
Start: 2020-03-19 | End: 2020-05-04 | Stop reason: SINTOL

## 2020-03-19 NOTE — TELEPHONE ENCOUNTER
The patient has an upcoming office visit scheduled for 5/19/2020 with EUSEBIA Orozco in the [place] location but will run out of medication until then    Request for same, 90 day supply with NO refills was queued and forwarded to the Advanced Practitioner covering the [place] location for approval

## 2020-04-16 ENCOUNTER — TELEPHONE (OUTPATIENT)
Dept: UROLOGY | Facility: AMBULATORY SURGERY CENTER | Age: 35
End: 2020-04-16

## 2020-04-27 ENCOUNTER — TELEMEDICINE (OUTPATIENT)
Dept: FAMILY MEDICINE CLINIC | Facility: CLINIC | Age: 35
End: 2020-04-27
Payer: COMMERCIAL

## 2020-04-27 VITALS — BODY MASS INDEX: 22.2 KG/M2 | HEIGHT: 64 IN | WEIGHT: 130 LBS

## 2020-04-27 DIAGNOSIS — R09.89 THROAT CLEARING: ICD-10-CM

## 2020-04-27 DIAGNOSIS — R12 HEARTBURN: ICD-10-CM

## 2020-04-27 DIAGNOSIS — J02.9 SORE THROAT: Primary | ICD-10-CM

## 2020-04-27 PROCEDURE — 99203 OFFICE O/P NEW LOW 30 MIN: CPT | Performed by: FAMILY MEDICINE

## 2020-04-27 RX ORDER — AMOXICILLIN 500 MG/1
500 CAPSULE ORAL EVERY 12 HOURS SCHEDULED
Qty: 14 CAPSULE | Refills: 0 | Status: SHIPPED | OUTPATIENT
Start: 2020-04-27 | End: 2020-05-04

## 2020-05-04 ENCOUNTER — TELEMEDICINE (OUTPATIENT)
Dept: UROLOGY | Facility: AMBULATORY SURGERY CENTER | Age: 35
End: 2020-05-04
Payer: COMMERCIAL

## 2020-05-04 DIAGNOSIS — R39.15 URINARY URGENCY: Primary | ICD-10-CM

## 2020-05-04 DIAGNOSIS — N39.41 URGE INCONTINENCE: ICD-10-CM

## 2020-05-04 PROCEDURE — 99213 OFFICE O/P EST LOW 20 MIN: CPT | Performed by: NURSE PRACTITIONER

## 2020-05-04 RX ORDER — OXYBUTYNIN CHLORIDE 10 MG/1
10 TABLET, EXTENDED RELEASE ORAL
Qty: 90 TABLET | Refills: 3 | Status: SHIPPED | OUTPATIENT
Start: 2020-05-04 | End: 2020-08-04 | Stop reason: ALTCHOICE

## 2020-05-19 ENCOUNTER — TELEPHONE (OUTPATIENT)
Dept: FAMILY MEDICINE CLINIC | Facility: CLINIC | Age: 35
End: 2020-05-19

## 2020-05-19 ENCOUNTER — TELEPHONE (OUTPATIENT)
Dept: OTOLARYNGOLOGY | Facility: CLINIC | Age: 35
End: 2020-05-19

## 2020-05-21 ENCOUNTER — OFFICE VISIT (OUTPATIENT)
Dept: OTOLARYNGOLOGY | Facility: CLINIC | Age: 35
End: 2020-05-21
Payer: COMMERCIAL

## 2020-05-21 VITALS
BODY MASS INDEX: 23.41 KG/M2 | WEIGHT: 137.1 LBS | RESPIRATION RATE: 17 BRPM | SYSTOLIC BLOOD PRESSURE: 137 MMHG | HEIGHT: 64 IN | DIASTOLIC BLOOD PRESSURE: 77 MMHG | HEART RATE: 78 BPM

## 2020-05-21 DIAGNOSIS — K11.7 XEROSTOMIA: ICD-10-CM

## 2020-05-21 DIAGNOSIS — K21.9 LARYNGOPHARYNGEAL REFLUX (LPR): Primary | ICD-10-CM

## 2020-05-21 PROCEDURE — 99203 OFFICE O/P NEW LOW 30 MIN: CPT | Performed by: OTOLARYNGOLOGY

## 2020-05-21 RX ORDER — OMEPRAZOLE 40 MG/1
40 CAPSULE, DELAYED RELEASE ORAL DAILY
Qty: 30 CAPSULE | Refills: 2 | Status: SHIPPED | OUTPATIENT
Start: 2020-05-21 | End: 2020-08-13

## 2020-05-21 RX ORDER — FAMOTIDINE 40 MG/1
40 TABLET, FILM COATED ORAL DAILY
Qty: 30 TABLET | Refills: 2 | Status: SHIPPED | OUTPATIENT
Start: 2020-05-21 | End: 2020-08-13

## 2020-06-11 ENCOUNTER — TELEPHONE (OUTPATIENT)
Dept: UROLOGY | Facility: MEDICAL CENTER | Age: 35
End: 2020-06-11

## 2020-06-11 DIAGNOSIS — R39.9 UTI SYMPTOMS: Primary | ICD-10-CM

## 2020-06-12 ENCOUNTER — APPOINTMENT (OUTPATIENT)
Dept: LAB | Age: 35
End: 2020-06-12
Payer: COMMERCIAL

## 2020-06-12 DIAGNOSIS — R39.9 UTI SYMPTOMS: ICD-10-CM

## 2020-06-12 LAB
BACTERIA UR QL AUTO: NORMAL /HPF
BILIRUB UR QL STRIP: NEGATIVE
CLARITY UR: CLEAR
COLOR UR: YELLOW
GLUCOSE UR STRIP-MCNC: NEGATIVE MG/DL
HGB UR QL STRIP.AUTO: NEGATIVE
HYALINE CASTS #/AREA URNS LPF: NORMAL /LPF
KETONES UR STRIP-MCNC: NEGATIVE MG/DL
LEUKOCYTE ESTERASE UR QL STRIP: NEGATIVE
NITRITE UR QL STRIP: NEGATIVE
NON-SQ EPI CELLS URNS QL MICRO: NORMAL /HPF
PH UR STRIP.AUTO: 7.5 [PH]
PROT UR STRIP-MCNC: NEGATIVE MG/DL
RBC #/AREA URNS AUTO: NORMAL /HPF
SP GR UR STRIP.AUTO: 1.01 (ref 1–1.03)
UROBILINOGEN UR QL STRIP.AUTO: 0.2 E.U./DL
WBC #/AREA URNS AUTO: NORMAL /HPF

## 2020-06-12 PROCEDURE — 81001 URINALYSIS AUTO W/SCOPE: CPT

## 2020-06-21 ENCOUNTER — TRANSCRIBE ORDERS (OUTPATIENT)
Dept: LAB | Facility: HOSPITAL | Age: 35
End: 2020-06-21

## 2020-08-03 ENCOUNTER — APPOINTMENT (OUTPATIENT)
Dept: LAB | Facility: HOSPITAL | Age: 35
End: 2020-08-03
Payer: COMMERCIAL

## 2020-08-03 DIAGNOSIS — R39.9 UTI SYMPTOMS: ICD-10-CM

## 2020-08-03 DIAGNOSIS — R39.9 UTI SYMPTOMS: Primary | ICD-10-CM

## 2020-08-03 LAB
BACTERIA UR QL AUTO: ABNORMAL /HPF
BILIRUB UR QL STRIP: NEGATIVE
CLARITY UR: ABNORMAL
COLOR UR: YELLOW
GLUCOSE UR STRIP-MCNC: NEGATIVE MG/DL
HGB UR QL STRIP.AUTO: ABNORMAL
HYALINE CASTS #/AREA URNS LPF: ABNORMAL /LPF
KETONES UR STRIP-MCNC: NEGATIVE MG/DL
LEUKOCYTE ESTERASE UR QL STRIP: ABNORMAL
NITRITE UR QL STRIP: NEGATIVE
NON-SQ EPI CELLS URNS QL MICRO: ABNORMAL /HPF
PH UR STRIP.AUTO: 7 [PH]
PROT UR STRIP-MCNC: ABNORMAL MG/DL
RBC #/AREA URNS AUTO: ABNORMAL /HPF
SP GR UR STRIP.AUTO: 1.01 (ref 1–1.03)
UROBILINOGEN UR QL STRIP.AUTO: 0.2 E.U./DL
WBC #/AREA URNS AUTO: ABNORMAL /HPF

## 2020-08-03 PROCEDURE — 81001 URINALYSIS AUTO W/SCOPE: CPT

## 2020-08-03 PROCEDURE — 87077 CULTURE AEROBIC IDENTIFY: CPT

## 2020-08-03 PROCEDURE — 87086 URINE CULTURE/COLONY COUNT: CPT

## 2020-08-04 ENCOUNTER — OFFICE VISIT (OUTPATIENT)
Dept: UROLOGY | Facility: AMBULATORY SURGERY CENTER | Age: 35
End: 2020-08-04
Payer: COMMERCIAL

## 2020-08-04 VITALS
WEIGHT: 128 LBS | TEMPERATURE: 98 F | SYSTOLIC BLOOD PRESSURE: 110 MMHG | HEIGHT: 64 IN | DIASTOLIC BLOOD PRESSURE: 74 MMHG | BODY MASS INDEX: 21.85 KG/M2 | HEART RATE: 87 BPM

## 2020-08-04 DIAGNOSIS — R30.0 DYSURIA: Primary | ICD-10-CM

## 2020-08-04 DIAGNOSIS — N39.41 URGE INCONTINENCE: ICD-10-CM

## 2020-08-04 PROCEDURE — 99214 OFFICE O/P EST MOD 30 MIN: CPT | Performed by: NURSE PRACTITIONER

## 2020-08-04 PROCEDURE — 3008F BODY MASS INDEX DOCD: CPT | Performed by: NURSE PRACTITIONER

## 2020-08-04 PROCEDURE — 1036F TOBACCO NON-USER: CPT | Performed by: NURSE PRACTITIONER

## 2020-08-04 RX ORDER — CEPHALEXIN 500 MG/1
500 CAPSULE ORAL EVERY 8 HOURS SCHEDULED
Qty: 21 CAPSULE | Refills: 0 | Status: SHIPPED | OUTPATIENT
Start: 2020-08-04 | End: 2020-08-11

## 2020-08-04 RX ORDER — CEFAZOLIN SODIUM 1 G/50ML
1000 SOLUTION INTRAVENOUS ONCE
Status: CANCELLED | OUTPATIENT
Start: 2020-08-04 | End: 2020-08-04

## 2020-08-04 NOTE — PATIENT INSTRUCTIONS
Keflex sent to pharmacy  Trecia Goldmann will call to schedule the procedure  Call the office for concerns or questions

## 2020-08-04 NOTE — PROGRESS NOTES
8/4/2020      Chief Complaint   Patient presents with    Urinary Incontinence     DISCUSS BOTOX       Assessment and Plan    30 y o  female managed by Dr Melanie Amin  1  Urinary urgency/urge incontinence  · Stopped Oxybutynin due to side effects- dry eye/dry mouth  · Myrbetriq 50 mg mg p o  daily-prescription not needed at this time  · OR case requested for cystoscopy and injection of Botox 100 units  · Urinalysis performed 08/03/2020 positive for moderate leukocytes  · Urine culture pending  · Prescription for Keflex provided      History of Present Illness  Romina Smith is a 30 y o  female here for follow up evaluation of urinary urgency/urge incontinence  Patient is status post Botox 100 mg  injection 07/29/2019  She discontinued taking oxybutynin secondary to side effects of dry/dry mouth  She continues to take Myrbetriq 50 mg daily  She presents to the office today with complaints of worsening urinary urgency/urge incontinence  She denies dysuria, hematuria and the sensation of incomplete bladder emptying  She reports prior Botox injection proved to be very effective at maintaining her urinary continence  Denies complications secondary to anesthesia  She denies recent onset of chest pain, shortness of breath and dizziness  Patient denies smoking tobacco   She does report the use of medical  marijuana  and occasional alcohol intake  Review of Systems   Constitutional: Negative for chills and fever  Respiratory: Negative for cough and shortness of breath  Cardiovascular: Negative for chest pain  Gastrointestinal: Negative for abdominal distention, abdominal pain, blood in stool, nausea and vomiting  Genitourinary: Positive for urgency  Negative for difficulty urinating, dysuria, enuresis, flank pain, frequency and hematuria  Musculoskeletal: Negative for back pain  Skin: Negative for rash  Neurological: Negative for dizziness       Past Medical History  Past Medical History: Diagnosis Date    Anemia     Anxiety     Depression     Foot drop     Bilateral    History of spinal cord injury     at L1    Mental disorder     Small bowel problem     Chronic Constipation    Urinary incontinence May 30 2013       Past Social History  Past Surgical History:   Procedure Laterality Date    BOTOX INJECTION N/A 7/29/2019    Procedure: INJECTION BOTULINUM TOXIN (BOTOX); Surgeon: Miladys Cazares MD;  Location:  MAIN OR;  Service: Urology    COLONOSCOPY      CYSTOSCOPY      Diagnostic; onset: 1/3/17    FL MYELOGRAM 2 OR MORE REGIONS  9/6/2018    NJ COLONOSCOPY FLX DX W/COLLJ SPEC WHEN PFRMD N/A 12/12/2017    Procedure: COLONOSCOPY;  Surgeon: Tona Farrell MD;  Location: [facility];   Service: Gastroenterology    NJ CYSTOURETHROSCOPY N/A 7/29/2019    Procedure: CYSTOSCOPY;  Surgeon: Miladys Cazares MD;  Location: [facility];  Service: Urology    SPINAL FUSION      Lumbar     Social History     Tobacco Use   Smoking Status Former Smoker    Packs/day: 0 70    Years: 1 00    Pack years: 0 70    Types: Cigarettes    Last attempt to quit: 12/12/2016    Years since quitting: 3 6   Smokeless Tobacco Never Used       Past Family History  Family History   Problem Relation Age of Onset    Diabetes Father     Lymphoma Father         Waldenstrom's    Heart disease Father         cardiac disorder    Hypertension Father     Pulmonary embolism Father     Hyperlipidemia Father     Cancer Father     Clotting disorder Father     Heart attack Father     Stroke Family     Depression Mother     No Known Problems Sister     No Known Problems Brother     Stroke Maternal Grandmother        Past Social history  Social History     Socioeconomic History    Marital status: Single     Spouse name: Not on file    Number of children: Not on file    Years of education: Not on file    Highest education level: Not on file   Occupational History    Not on file   Social Needs    Financial resource strain: Not on file    Food insecurity     Worry: Not on file     Inability: Not on file    Transportation needs     Medical: Not on file     Non-medical: Not on file   Tobacco Use    Smoking status: Former Smoker     Packs/day: 0 70     Years: 1 00     Pack years: 0 70     Types: Cigarettes     Last attempt to quit: 12/12/2016     Years since quitting: 3 6    Smokeless tobacco: Never Used   Substance and Sexual Activity    Alcohol use: Yes     Frequency: 2-4 times a month     Drinks per session: 1 or 2     Binge frequency: Never    Drug use: No    Sexual activity: Yes     Partners: Male     Birth control/protection: Coitus interruptus, I U D     Lifestyle    Physical activity     Days per week: Not on file     Minutes per session: Not on file    Stress: Not on file   Relationships    Social connections     Talks on phone: Not on file     Gets together: Not on file     Attends [employer] service: Not on file     Active member of club or organization: Not on file     Attends meetings of clubs or organizations: Not on file     Relationship status: Not on file    Intimate partner violence     Fear of current or ex partner: Not on file     Emotionally abused: Not on file     Physically abused: Not on file     Forced sexual activity: Not on file   Other Topics Concern    Not on file   Social History Narrative    Not on file       Current Medications  Current Outpatient Medications   Medication Sig Dispense Refill    busPIRone (BUSPAR) 10 mg tablet Take 1 tablet by mouth 3 (three) times a day      carBAMazepine (TEGretol) 200 mg tablet Take 200 mg by mouth 2 (two) times a day        famotidine (PEPCID) 40 MG tablet Take 1 tablet (40 mg total) by mouth daily 30 tablet 2    hydrOXYzine HCL (ATARAX) 10 mg tablet as needed      levonorgestrel (MIRENA) 20 MCG/24HR IUD 20 mcg/day      MYRBETRIQ 50 MG TB24 TAKE 1 TABLET BY MOUTH EVERY DAY 30 tablet 11    omeprazole (PriLOSEC) 40 MG capsule Take 1 capsule (40 mg total) by mouth daily 30 capsule 2    sertraline (ZOLOFT) 100 mg tablet Take 100 mg by mouth every morning  0    cephalexin (KEFLEX) 500 mg capsule Take 1 capsule (500 mg total) by mouth every 8 (eight) hours for 7 days 21 capsule 0     No current facility-administered medications for this visit  Allergies  Allergies   Allergen Reactions    Sulfamethoxazole Nausea Only and GI Intolerance         The following portions of the patient's history were reviewed and updated as appropriate: allergies, current medications, past medical history, past social history, past surgical history and problem list       Vitals  Vitals:    08/04/20 1117   BP: 110/74   BP Location: Left arm   Patient Position: Sitting   Cuff Size: Adult   Pulse: 87   Temp: 98 °F (36 7 °C)   TempSrc: Temporal   Weight: 58 1 kg (128 lb)   Height: 5' 4"           Physical Exam  Physical Exam   HENT:   Head: Normocephalic  Eyes: Pupils are equal, round, and reactive to light  Neck: Normal range of motion  Cardiovascular: Normal rate, regular rhythm and normal heart sounds  No murmur heard  Pulmonary/Chest: Effort normal and breath sounds normal  No respiratory distress  Abdominal: Normal appearance  Musculoskeletal:      Comments: Lower extremity orthopedic braces  Use of single point cane   Neurological: She is alert  Skin: Skin is warm and dry  Psychiatric: Mood normal    Vitals reviewed  Results  No results found for this or any previous visit (from the past 1 hour(s))  ]  No results found for: PSA  Lab Results   Component Value Date    GLUCOSE 92 12/28/2015    CALCIUM 8 7 06/24/2019     06/21/2017    K 4 0 06/24/2019    CO2 29 06/24/2019     06/24/2019    BUN 8 06/24/2019    CREATININE 0 45 (L) 06/24/2019     Lab Results   Component Value Date    WBC 3 68 (L) 09/07/2019    HGB 13 7 09/07/2019    HCT 40 4 09/07/2019    MCV 96 09/07/2019     09/07/2019       Orders  No orders of the defined types were placed in this encounter        EUSEBIA Huitron

## 2020-08-04 NOTE — H&P
8/4/2020      Chief Complaint   Patient presents with    Urinary Incontinence     DISCUSS BOTOX       Assessment and Plan    30 y o  female managed by Dr Nila Monet  1  Urinary urgency/urge incontinence  · Stopped Oxybutynin due to side effects- dry eye/dry mouth  · Myrbetriq 50 mg mg p o  daily-prescription not needed at this time  · OR case requested for cystoscopy and injection of Botox 100 units  · Urinalysis performed 08/03/2020 positive for moderate leukocytes  · Urine culture pending  · Prescription for Keflex provided      History of Present Illness  Kan Campos is a 30 y o  female here for follow up evaluation of urinary urgency/urge incontinence  Patient is status post Botox 100 mg  injection 07/29/2019  She discontinued taking oxybutynin secondary to side effects of dry/dry mouth  She continues to take Myrbetriq 50 mg daily  She presents to the office today with complaints of worsening urinary urgency/urge incontinence  She denies dysuria, hematuria and the sensation of incomplete bladder emptying  She reports prior Botox injection proved to be very effective at maintaining her urinary continence  Denies complications secondary to anesthesia  She denies recent onset of chest pain, shortness of breath and dizziness  Patient denies smoking tobacco   She does report the use of medical  marijuana  and occasional alcohol intake  Review of Systems   Constitutional: Negative for chills and fever  Respiratory: Negative for cough and shortness of breath  Cardiovascular: Negative for chest pain  Gastrointestinal: Negative for abdominal distention, abdominal pain, blood in stool, nausea and vomiting  Genitourinary: Positive for urgency  Negative for difficulty urinating, dysuria, enuresis, flank pain, frequency and hematuria  Musculoskeletal: Negative for back pain  Skin: Negative for rash  Neurological: Negative for dizziness       Past Medical History  Past Medical History: Diagnosis Date    Anemia     Anxiety     Depression     Foot drop     Bilateral    History of spinal cord injury     at L1    Mental disorder     Small bowel problem     Chronic Constipation    Urinary incontinence May 30 2013       Past Social History  Past Surgical History:   Procedure Laterality Date    BOTOX INJECTION N/A 7/29/2019    Procedure: INJECTION BOTULINUM TOXIN (BOTOX); Surgeon: Renee Allen MD;  Location[facility];  Service: Urology    COLONOSCOPY      CYSTOSCOPY      Diagnostic; onset: 1/3/17    FL MYELOGRAM 2 OR MORE REGIONS  9/6/2018    ME COLONOSCOPY FLX DX W/COLLJ SPEC WHEN PFRMD N/A 12/12/2017    Procedure: COLONOSCOPY;  Surgeon: Jaelyn Fleming MD;  [facility];   Service: Gastroenterology    ME CYSTOURETHROSCOPY N/A 7/29/2019    Procedure: CYSTOSCOPY;  Surgeon: Renee Allen MD;  [facility];  Service: Urology    SPINAL FUSION      Lumbar     Social History     Tobacco Use   Smoking Status Former Smoker    Packs/day: 0 70    Years: 1 00    Pack years: 0 70    Types: Cigarettes    Last attempt to quit: 12/12/2016    Years since quitting: 3 6   Smokeless Tobacco Never Used       Past Family History  Family History   Problem Relation Age of Onset    Diabetes Father     Lymphoma Father         Waldenstrom's    Heart disease Father         cardiac disorder    Hypertension Father     Pulmonary embolism Father     Hyperlipidemia Father     Cancer Father     Clotting disorder Father     Heart attack Father     Stroke Family     Depression Mother     No Known Problems Sister     No Known Problems Brother     Stroke Maternal Grandmother        Past Social history  Social History     Socioeconomic History    Marital status: Single     Spouse name: Not on file    Number of children: Not on file    Years of education: Not on file    Highest education level: Not on file   Occupational History    Not on file   Social Needs    Financial resource strain: Not on file    Food insecurity     Worry: Not on file     Inability: Not on file    Transportation needs     Medical: Not on file     Non-medical: Not on file   Tobacco Use    Smoking status: Former Smoker     Packs/day: 0 70     Years: 1 00     Pack years: 0 70     Types: Cigarettes     Last attempt to quit: 12/12/2016     Years since quitting: 3 6    Smokeless tobacco: Never Used   Substance and Sexual Activity    Alcohol use: Yes     Frequency: 2-4 times a month     Drinks per session: 1 or 2     Binge frequency: Never    Drug use: No    Sexual activity: Yes     Partners: Male     Birth control/protection: Coitus interruptus, I U D     Lifestyle    Physical activity     Days per week: Not on file     Minutes per session: Not on file    Stress: Not on file   Relationships    Social connections     Talks on phone: Not on file     Gets together: Not on file     Attends Hindu service: Not on file     Active member of club or organization: Not on file     Attends meetings of clubs or organizations: Not on file     Relationship status: Not on file    Intimate partner violence     Fear of current or ex partner: Not on file     Emotionally abused: Not on file     Physically abused: Not on file     Forced sexual activity: Not on file   Other Topics Concern    Not on file   Social History Narrative    Not on file       Current Medications  Current Outpatient Medications   Medication Sig Dispense Refill    busPIRone (BUSPAR) 10 mg tablet Take 1 tablet by mouth 3 (three) times a day      carBAMazepine (TEGretol) 200 mg tablet Take 200 mg by mouth 2 (two) times a day        famotidine (PEPCID) 40 MG tablet Take 1 tablet (40 mg total) by mouth daily 30 tablet 2    hydrOXYzine HCL (ATARAX) 10 mg tablet as needed      levonorgestrel (MIRENA) 20 MCG/24HR IUD 20 mcg/day      MYRBETRIQ 50 MG TB24 TAKE 1 TABLET BY MOUTH EVERY DAY 30 tablet 11    omeprazole (PriLOSEC) 40 MG capsule Take 1 capsule (40 mg total) by mouth daily 30 capsule 2    sertraline (ZOLOFT) 100 mg tablet Take 100 mg by mouth every morning  0    cephalexin (KEFLEX) 500 mg capsule Take 1 capsule (500 mg total) by mouth every 8 (eight) hours for 7 days 21 capsule 0     No current facility-administered medications for this visit  Allergies  Allergies   Allergen Reactions    Sulfamethoxazole Nausea Only and GI Intolerance         The following portions of the patient's history were reviewed and updated as appropriate: allergies, current medications, past medical history, past social history, past surgical history and problem list       Vitals  Vitals:    08/04/20 1117   BP: 110/74   BP Location: Left arm   Patient Position: Sitting   Cuff Size: Adult   Pulse: 87   Temp: 98 °F (36 7 °C)   TempSrc: Temporal   Weight: 58 1 kg (128 lb)   Height: 5' 4"           Physical Exam  Physical Exam   HENT:   Head: Normocephalic  Eyes: Pupils are equal, round, and reactive to light  Neck: Normal range of motion  Cardiovascular: Normal rate, regular rhythm and normal heart sounds  No murmur heard  Pulmonary/Chest: Effort normal and breath sounds normal  No respiratory distress  Abdominal: Normal appearance  Musculoskeletal:      Comments: Lower extremity orthopedic braces  Use of single point cane   Neurological: She is alert  Skin: Skin is warm and dry  Psychiatric: Mood normal    Vitals reviewed  Results  No results found for this or any previous visit (from the past 1 hour(s))  ]  No results found for: PSA  Lab Results   Component Value Date    GLUCOSE 92 12/28/2015    CALCIUM 8 7 06/24/2019     06/21/2017    K 4 0 06/24/2019    CO2 29 06/24/2019     06/24/2019    BUN 8 06/24/2019    CREATININE 0 45 (L) 06/24/2019     Lab Results   Component Value Date    WBC 3 68 (L) 09/07/2019    HGB 13 7 09/07/2019    HCT 40 4 09/07/2019    MCV 96 09/07/2019     09/07/2019       Orders  No orders of the defined types were placed in this encounter        EUSEBIA Alfred

## 2020-08-05 LAB — BACTERIA UR CULT: ABNORMAL

## 2020-08-07 ENCOUNTER — TELEPHONE (OUTPATIENT)
Dept: UROLOGY | Facility: AMBULATORY SURGERY CENTER | Age: 35
End: 2020-08-07

## 2020-08-07 NOTE — TELEPHONE ENCOUNTER
I spoke with Lacey Sanon  Her [address] insurance is terming 8/31/20  She will have [facility], medical assistance plan, effective 9/1/20, due to her moving to [address] [place] [address] Road  We do not accept this plan  She will call [facility] for a participating urologist and call our office to obtain her records

## 2020-08-13 DIAGNOSIS — K21.9 LARYNGOPHARYNGEAL REFLUX (LPR): ICD-10-CM

## 2020-08-13 RX ORDER — FAMOTIDINE 40 MG/1
TABLET, FILM COATED ORAL
Qty: 30 TABLET | Refills: 2 | Status: SHIPPED | OUTPATIENT
Start: 2020-08-13 | End: 2021-11-01

## 2020-08-13 RX ORDER — OMEPRAZOLE 40 MG/1
CAPSULE, DELAYED RELEASE ORAL
Qty: 30 CAPSULE | Refills: 2 | Status: SHIPPED | OUTPATIENT
Start: 2020-08-13 | End: 2021-02-04

## 2020-08-18 ENCOUNTER — TELEPHONE (OUTPATIENT)
Dept: UROLOGY | Facility: MEDICAL CENTER | Age: 35
End: 2020-08-18

## 2020-08-18 NOTE — TELEPHONE ENCOUNTER
Patient left a message on the Medication Refill voice mail line stating the she got a call from her pharmacy that Myrbetriq is non-covered by her [address]  She attempted to call them but was on hold too long and had to hand up  Do we have status of her prescription?

## 2020-08-26 ENCOUNTER — TELEPHONE (OUTPATIENT)
Dept: UROLOGY | Facility: AMBULATORY SURGERY CENTER | Age: 35
End: 2020-08-26

## 2020-08-27 NOTE — TELEPHONE ENCOUNTER
Patient called in regard to the [facility] prescription  Notified of authorization being started  No further questions noted    Thank you

## 2020-08-31 NOTE — TELEPHONE ENCOUNTER
Pt called checking status Auth,I informed her per AR that she will be checking with Insurance Co today and contact her later with status,pt understood and will wait to hear from her

## 2020-08-31 NOTE — TELEPHONE ENCOUNTER
Prior Authorization for Myrbetriq 50mg was APPROVED and valid from 8/27/20 until 8/27/21 (34 tablets every 34 days)  Both the patient and the pharmacy informed of same
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
## 2020-12-04 ENCOUNTER — TELEPHONE (OUTPATIENT)
Dept: UROLOGY | Facility: AMBULATORY SURGERY CENTER | Age: 35
End: 2020-12-04

## 2021-02-04 ENCOUNTER — OFFICE VISIT (OUTPATIENT)
Dept: FAMILY MEDICINE CLINIC | Facility: HOSPITAL | Age: 36
End: 2021-02-04
Payer: COMMERCIAL

## 2021-02-04 VITALS
HEART RATE: 80 BPM | HEIGHT: 64 IN | TEMPERATURE: 98.7 F | WEIGHT: 133.2 LBS | BODY MASS INDEX: 22.74 KG/M2 | DIASTOLIC BLOOD PRESSURE: 74 MMHG | SYSTOLIC BLOOD PRESSURE: 122 MMHG

## 2021-02-04 DIAGNOSIS — R12 HEARTBURN: ICD-10-CM

## 2021-02-04 DIAGNOSIS — G89.29 CHRONIC MIDLINE THORACIC BACK PAIN: ICD-10-CM

## 2021-02-04 DIAGNOSIS — N31.9 NEUROGENIC BLADDER: ICD-10-CM

## 2021-02-04 DIAGNOSIS — M54.6 CHRONIC MIDLINE THORACIC BACK PAIN: ICD-10-CM

## 2021-02-04 DIAGNOSIS — Z87.828 H/O SPINAL CORD INJURY: ICD-10-CM

## 2021-02-04 DIAGNOSIS — Z23 ENCOUNTER FOR IMMUNIZATION: ICD-10-CM

## 2021-02-04 DIAGNOSIS — F31.70 BIPOLAR DISORDER IN FULL REMISSION, MOST RECENT EPISODE UNSPECIFIED TYPE (HCC): Primary | ICD-10-CM

## 2021-02-04 PROBLEM — R14.0 ABDOMINAL BLOATING WITH CRAMPS: Status: RESOLVED | Noted: 2019-08-23 | Resolved: 2021-02-04

## 2021-02-04 PROBLEM — M21.379 DROP FOOT GAIT: Status: ACTIVE | Noted: 2021-02-04

## 2021-02-04 PROBLEM — R10.9 ABDOMINAL BLOATING WITH CRAMPS: Status: RESOLVED | Noted: 2019-08-23 | Resolved: 2021-02-04

## 2021-02-04 PROCEDURE — 90471 IMMUNIZATION ADMIN: CPT

## 2021-02-04 PROCEDURE — 90686 IIV4 VACC NO PRSV 0.5 ML IM: CPT

## 2021-02-04 PROCEDURE — 99215 OFFICE O/P EST HI 40 MIN: CPT | Performed by: INTERNAL MEDICINE

## 2021-02-04 RX ORDER — SOLIFENACIN SUCCINATE 10 MG/1
1 TABLET, FILM COATED ORAL EVERY EVENING
COMMUNITY
Start: 2020-10-01 | End: 2021-04-13 | Stop reason: HOSPADM

## 2021-02-04 RX ORDER — GABAPENTIN 100 MG/1
100 CAPSULE ORAL 3 TIMES DAILY
Qty: 90 CAPSULE | Refills: 1 | Status: SHIPPED | OUTPATIENT
Start: 2021-02-04 | End: 2021-11-01

## 2021-02-04 NOTE — ASSESSMENT & PLAN NOTE
No benefit with Cymbalta or injections in past, Lyrica and Gabapentin reviewed as was TCA's, has had some issues with Gabapentin in past but will to try again - will start at 100 mg 1 tab PO q pm and slowly titrate up to 100 mg 1 tab PO tid - SE reviewed, call if SE occur, re-eval in 6 wks
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
## 2021-02-04 NOTE — ASSESSMENT & PLAN NOTE
Has had benefit with Vesicare and Botox injections - wishes to have injections again - number for Uro given, con't Vesicare as prescribed, call with new/worse symptoms

## 2021-03-10 DIAGNOSIS — Z23 ENCOUNTER FOR IMMUNIZATION: ICD-10-CM

## 2021-03-12 ENCOUNTER — OFFICE VISIT (OUTPATIENT)
Dept: UROLOGY | Facility: MEDICAL CENTER | Age: 36
End: 2021-03-12
Payer: COMMERCIAL

## 2021-03-12 VITALS — DIASTOLIC BLOOD PRESSURE: 74 MMHG | SYSTOLIC BLOOD PRESSURE: 122 MMHG | HEIGHT: 64 IN | BODY MASS INDEX: 22.86 KG/M2

## 2021-03-12 DIAGNOSIS — N39.41 URGE INCONTINENCE: Primary | ICD-10-CM

## 2021-03-12 PROCEDURE — 99214 OFFICE O/P EST MOD 30 MIN: CPT | Performed by: UROLOGY

## 2021-03-12 RX ORDER — ATOMOXETINE 18 MG/1
CAPSULE ORAL
COMMUNITY
Start: 2020-12-16 | End: 2021-04-09

## 2021-03-12 NOTE — PROGRESS NOTES
HISTORY:     follow-up on urgency incontinence related to neurogenic bladder  Her last Botox was July 2019, work great for least one year since then increasing urgency incontinence, wears 5-10 pads per day  Myrbetriq was not helping recently, she is on solifenacin with only so so help         ASSESSMENT / PLAN:     options discussed  Will schedule Botox, potential complications, including retention all discussed, she agrees to proceed    The following portions of the patient's history were reviewed and updated as appropriate: allergies, current medications, past family history, past medical history, past social history, past surgical history and problem list     Review of Systems      Objective:     Physical Exam  Constitutional:       General: She is not in acute distress  Appearance: She is well-developed  She is not diaphoretic  HENT:      Head: Normocephalic and atraumatic  Eyes:      General: No scleral icterus  Pulmonary:      Effort: Pulmonary effort is normal    Skin:     Coloration: Skin is not pale  Neurological:      Mental Status: She is alert and oriented to person, place, and time  Comments: Mild deficits   Psychiatric:         Behavior: Behavior normal          Thought Content:  Thought content normal          Judgment: Judgment normal            No results found for: PSA]  BUN   Date Value Ref Range Status   06/24/2019 8 5 - 25 mg/dL Final   03/06/2018 12 7 - 25 mg/dL Final     Creatinine   Date Value Ref Range Status   06/24/2019 0 45 (L) 0 60 - 1 30 mg/dL Final     Comment:     Standardized to IDMS reference method   06/21/2017 0 62 0 50 - 1 10 mg/dL Final     No components found for: CBC      Patient Active Problem List   Diagnosis    Paronychia of toe of right foot    Constipation    H/O spinal cord injury    Chronic midline thoracic back pain    Low vitamin B12 level    Vegetarian diet    Injury to L1 level of spinal cord (HCC)    Bipolar affective disorder ([facility] [address] )  Lumbar radiculopathy    Myofascial pain    Urinary urgency    Night sweats    Snoring    Urge incontinence    Neurogenic bladder    Nausea    Heartburn    Throat clearing    Drop foot gait        Diagnoses and all orders for this visit:    Urge incontinence  -     Case request operating room: INJECTION BOTULINUM TOXIN (BOTOX), cystoscopy; Standing  -     Case request operating room: INJECTION BOTULINUM TOXIN (BOTOX), cystoscopy    Other orders  -     atoMOXetine (STRATTERA) 18 mg capsule; TAKE 1 CAPSULE BY MOUTH EVERY MORNING (SWALLOW WHOLE)  -     Diet NPO; Sips with meds; Standing  -     Place sequential compression device; Standing           Patient ID: Pat Blue is a 37 y o  female        Current Outpatient Medications:     busPIRone (BUSPAR) 10 mg tablet, Take 1 tablet by mouth 3 (three) times a day, Disp: , Rfl:     famotidine (PEPCID) 40 MG tablet, TAKE 1 TABLET BY MOUTH EVERY DAY, Disp: 30 tablet, Rfl: 2    gabapentin (NEURONTIN) 100 mg capsule, Take 1 capsule (100 mg total) by mouth 3 (three) times a day, Disp: 90 capsule, Rfl: 1    hydrOXYzine HCL (ATARAX) 10 mg tablet, as needed, Disp: , Rfl:     levonorgestrel (MIRENA) 20 MCG/24HR IUD, 20 mcg/day, Disp: , Rfl:     sertraline (ZOLOFT) 100 mg tablet, Take 100 mg by mouth every morning, Disp: , Rfl: 0    solifenacin (VESICARE) 10 MG tablet, Take 1 tablet by mouth daily, Disp: , Rfl:     atoMOXetine (STRATTERA) 18 mg capsule, TAKE 1 CAPSULE BY MOUTH EVERY MORNING (SWALLOW WHOLE), Disp: , Rfl:     carBAMazepine (TEGretol) 200 mg tablet, Take 200 mg by mouth 2 (two) times a day  , Disp: , Rfl:     Past Medical History:   Diagnosis Date    Depression     Foot drop     Bilateral    History of spinal cord injury     at L1    Small bowel problem     Chronic Constipation    Substance abuse ([facility] [address] )     Urinary incontinence May 30 2013       Past Surgical History:   Procedure Laterality Date    BOTOX INJECTION N/A 7/29/2019 Procedure: INJECTION BOTULINUM TOXIN (BOTOX); Surgeon: Dipak Khalil MD;  Location:  MAIN OR;  Service: Urology    COLONOSCOPY      CYSTOSCOPY      Diagnostic; onset: 1/3/17    FL MYELOGRAM 2 OR MORE REGIONS  9/6/2018    ND COLONOSCOPY FLX DX W/COLLJ SPEC WHEN PFRMD N/A 12/12/2017    Procedure: COLONOSCOPY;  Surgeon: Jenny Bunch MD;  [facility]  GI LAB;   Service: Gastroenterology    ND CYSTOURETHROSCOPY N/A 7/29/2019    Procedure: Gloria Phelps;  Surgeon: Dipak Khalil MD;  Location:  MAIN OR;  Service: Urology    SPINAL FUSION      [facility]   [address] [facility] [place]  [facility]       Social History

## 2021-03-26 ENCOUNTER — TELEPHONE (OUTPATIENT)
Dept: ADMINISTRATIVE | Facility: OTHER | Age: 36
End: 2021-03-26

## 2021-03-26 NOTE — TELEPHONE ENCOUNTER
----- Message from Emelia Saab MA sent at 3/26/2021  1:41 PM EDT -----  Regarding: Cervical Cancer Screening  03/26/21 1:42 PM    Hello, our patient Gustavo Lewis has had Pap Smear (HPV) aka Cervical Cancer Screening completed/performed  Please assist in updating the patient chart by pulling the document from Lab Tab within Chart Review  The date of service is 10/19/20       Thank you,  Leitha Soles, MA Marlow Alpers MD

## 2021-03-26 NOTE — TELEPHONE ENCOUNTER
Upon review of the In Basket request we were able to locate, review, and update the patient chart as requested for Pap Smear (HPV) aka Cervical Cancer Screening  Any additional questions or concerns should be emailed to the Practice Liaisons via [email or web address] email, please do not reply via In Basket      Thank you  Daisy Ritchie

## 2021-04-07 ENCOUNTER — LAB (OUTPATIENT)
Dept: LAB | Facility: HOSPITAL | Age: 36
End: 2021-04-07
Attending: UROLOGY
Payer: COMMERCIAL

## 2021-04-07 DIAGNOSIS — N32.81 OAB (OVERACTIVE BLADDER): ICD-10-CM

## 2021-04-07 DIAGNOSIS — N39.41 URGE INCONTINENCE: ICD-10-CM

## 2021-04-07 LAB
ALBUMIN SERPL BCP-MCNC: 4.1 G/DL (ref 3.5–5)
ALP SERPL-CCNC: 68 U/L (ref 46–116)
ALT SERPL W P-5'-P-CCNC: 19 U/L (ref 12–78)
ANION GAP SERPL CALCULATED.3IONS-SCNC: 6 MMOL/L (ref 4–13)
AST SERPL W P-5'-P-CCNC: 17 U/L (ref 5–45)
BASOPHILS # BLD AUTO: 0.03 THOUSANDS/ΜL (ref 0–0.1)
BASOPHILS NFR BLD AUTO: 1 % (ref 0–1)
BILIRUB SERPL-MCNC: 0.93 MG/DL (ref 0.2–1)
BUN SERPL-MCNC: 13 MG/DL (ref 5–25)
CALCIUM SERPL-MCNC: 9.2 MG/DL (ref 8.3–10.1)
CHLORIDE SERPL-SCNC: 106 MMOL/L (ref 100–108)
CO2 SERPL-SCNC: 28 MMOL/L (ref 21–32)
CREAT SERPL-MCNC: 0.64 MG/DL (ref 0.6–1.3)
EOSINOPHIL # BLD AUTO: 0.11 THOUSAND/ΜL (ref 0–0.61)
EOSINOPHIL NFR BLD AUTO: 2 % (ref 0–6)
ERYTHROCYTE [DISTWIDTH] IN BLOOD BY AUTOMATED COUNT: 12.1 % (ref 11.6–15.1)
GFR SERPL CREATININE-BSD FRML MDRD: 118 ML/MIN/1.73SQ M
GLUCOSE P FAST SERPL-MCNC: 79 MG/DL (ref 65–99)
HCT VFR BLD AUTO: 43.3 % (ref 34.8–46.1)
HGB BLD-MCNC: 14.5 G/DL (ref 11.5–15.4)
IMM GRANULOCYTES # BLD AUTO: 0.02 THOUSAND/UL (ref 0–0.2)
IMM GRANULOCYTES NFR BLD AUTO: 0 % (ref 0–2)
LYMPHOCYTES # BLD AUTO: 1.34 THOUSANDS/ΜL (ref 0.6–4.47)
LYMPHOCYTES NFR BLD AUTO: 24 % (ref 14–44)
MCH RBC QN AUTO: 32.7 PG (ref 26.8–34.3)
MCHC RBC AUTO-ENTMCNC: 33.5 G/DL (ref 31.4–37.4)
MCV RBC AUTO: 98 FL (ref 82–98)
MONOCYTES # BLD AUTO: 0.56 THOUSAND/ΜL (ref 0.17–1.22)
MONOCYTES NFR BLD AUTO: 10 % (ref 4–12)
NEUTROPHILS # BLD AUTO: 3.45 THOUSANDS/ΜL (ref 1.85–7.62)
NEUTS SEG NFR BLD AUTO: 63 % (ref 43–75)
NRBC BLD AUTO-RTO: 0 /100 WBCS
PLATELET # BLD AUTO: 223 THOUSANDS/UL (ref 149–390)
PMV BLD AUTO: 10.2 FL (ref 8.9–12.7)
POTASSIUM SERPL-SCNC: 3.8 MMOL/L (ref 3.5–5.3)
PROT SERPL-MCNC: 7.4 G/DL (ref 6.4–8.2)
RBC # BLD AUTO: 4.44 MILLION/UL (ref 3.81–5.12)
SODIUM SERPL-SCNC: 140 MMOL/L (ref 136–145)
WBC # BLD AUTO: 5.51 THOUSAND/UL (ref 4.31–10.16)

## 2021-04-07 PROCEDURE — 36415 COLL VENOUS BLD VENIPUNCTURE: CPT

## 2021-04-07 PROCEDURE — 85025 COMPLETE CBC W/AUTO DIFF WBC: CPT

## 2021-04-07 PROCEDURE — 80053 COMPREHEN METABOLIC PANEL: CPT

## 2021-04-07 PROCEDURE — 87086 URINE CULTURE/COLONY COUNT: CPT

## 2021-04-08 LAB — BACTERIA UR CULT: NORMAL

## 2021-04-09 PROCEDURE — NC001 PR NO CHARGE: Performed by: UROLOGY

## 2021-04-09 NOTE — PRE-PROCEDURE INSTRUCTIONS
Pre-Surgery Instructions:   Medication Instructions    busPIRone (BUSPAR) 10 mg tablet Instructed patient per Anesthesia Guidelines   famotidine (PEPCID) 40 MG tablet Instructed patient per Anesthesia Guidelines   gabapentin (NEURONTIN) 100 mg capsule Instructed patient per Anesthesia Guidelines   hydrOXYzine HCL (ATARAX) 10 mg tablet Instructed patient per Anesthesia Guidelines   levonorgestrel (MIRENA) 20 MCG/24HR IUD Instructed patient per Anesthesia Guidelines   sertraline (ZOLOFT) 100 mg tablet Instructed patient per Anesthesia Guidelines   solifenacin (VESICARE) 10 MG tablet Instructed patient per Anesthesia Guidelines  Instructed to take buspar/ gabapentin and sertraline am of surgery with sip ofw ater per anesthesia guidelines  Can take pepcid  And or atarax am of surgery if needed

## 2021-04-09 NOTE — H&P
HISTORY AND PHYSICAL  ? ? Patient Name: Vikas Edwards  Patient MRN: [id number]  Attending Provider: Harpal Kaufman MD  Service: Urology  Chief Complaint    Urgency incontinence related to neurogenic bladder  HPI   Vikas Edwards is a 37 y o  female with recent history:     follow-up on urgency incontinence related to neurogenic bladder  Her last Botox was July 2019, work great for least one year since then increasing urgency incontinence, wears 5-10 pads per day  Myrbetriq was not helping recently, she is on solifenacin with only so so help  I plan cysto, Botox injection  Potential risks and complications discussed, and informed consent was given by the patient  Medications  Meds/Allergies   No current facility-administered medications for this encounter  Cannot display prior to admission medications because the patient has not been admitted in this contact  No current facility-administered medications for this encounter       Current Outpatient Medications:     busPIRone (BUSPAR) 10 mg tablet, Take 1 tablet by mouth 3 (three) times a day, Disp: , Rfl:     famotidine (PEPCID) 40 MG tablet, TAKE 1 TABLET BY MOUTH EVERY DAY (Patient taking differently: Take by mouth daily as needed ), Disp: 30 tablet, Rfl: 2    gabapentin (NEURONTIN) 100 mg capsule, Take 1 capsule (100 mg total) by mouth 3 (three) times a day (Patient taking differently: Take 300 mg by mouth 2 (two) times a day ), Disp: 90 capsule, Rfl: 1    hydrOXYzine HCL (ATARAX) 10 mg tablet, as needed, Disp: , Rfl:     levonorgestrel (MIRENA) 20 MCG/24HR IUD, 20 mcg/day, Disp: , Rfl:     sertraline (ZOLOFT) 100 mg tablet, Take 100 mg by mouth every morning, Disp: , Rfl: 0    solifenacin (VESICARE) 10 MG tablet, Take 1 tablet by mouth every evening , Disp: , Rfl:   Review of Systems  10 point review of systems negative except as noted in HPI  Allergies  Allergies   Allergen Reactions    Sulfamethoxazole Nausea Only and GI Intolerance PMH  Past Medical History:   Diagnosis Date    Anxiety     Chronic pain disorder     back - thoracic area    Depression     Foot drop     Bilateral    History of spinal cord injury     at L1    Mild acid reflux     Small bowel problem     Chronic Constipation    Urge incontinence     Urinary incontinence May 30 2013    Use of cane as ambulatory aid     nguyen leg braces     Past surgical history  Past Surgical History:   Procedure Laterality Date    BOTOX INJECTION N/A 2019    Procedure: INJECTION BOTULINUM TOXIN (BOTOX); Surgeon: Wali Recio MD;  [facility];  Service: Urology    COLONOSCOPY      CYSTOSCOPY      Diagnostic; onset: 1/3/17    FL MYELOGRAM 2 OR MORE REGIONS  2018    MD COLONOSCOPY FLX DX W/COLLJ SPEC WHEN PFRMD N/A 2017    Procedure: COLONOSCOPY;  Surgeon: Qing Busby MD;  Location: [facility]  GI LAB; Service: Gastroenterology    MD CYSTOURETHROSCOPY N/A 2019    Procedure: Creta Hosteller;  Surgeon: Wali Recio MD;  [facility];  Service: Urology    SPINAL FUSION      Lumbar    SPINAL FUSION       Social history  Social History     Tobacco Use    Smoking status: Former Smoker     Packs/day: 0 70     Years: 1 00     Pack years: 0 70     Types: Cigarettes     Quit date: 2016     Years since quittin 3    Smokeless tobacco: Never Used   Substance Use Topics    Alcohol use: Yes     Frequency: 2-4 times a month     Drinks per session: 1 or 2     Binge frequency: Never     Comment: liquor    Drug use: Not Currently     Types: Marijuana     ? Physical Exam     vs  General appearance: alert and oriented, in no acute distress  Head: Normocephalic, without obvious abnormality, atraumatic  Eyes: conjunctivae/corneas clear  PERRL, EOM's intact  Fundi benign    Throat: lips, mucosa, and tongue normal; teeth and gums normal  Neck: no adenopathy, no carotid bruit, no JVD, supple, symmetrical, trachea midline and thyroid not enlarged, symmetric, no tenderness/mass/nodules  Lungs: clear to auscultation bilaterally  Heart: regular rate and rhythm, S1, S2 normal, no murmur, click, rub or gallop  Abdomen: soft, non-tender; bowel sounds normal; no masses,  no organomegaly  Extremities: extremities normal, warm and well-perfused; no cyanosis, clubbing, or edema  Neurologic: Grossly normal  Margaux Johnson MD

## 2021-04-11 ENCOUNTER — ANESTHESIA EVENT (OUTPATIENT)
Dept: PERIOP | Facility: HOSPITAL | Age: 36
End: 2021-04-11
Payer: COMMERCIAL

## 2021-04-13 ENCOUNTER — ANESTHESIA (OUTPATIENT)
Dept: PERIOP | Facility: HOSPITAL | Age: 36
End: 2021-04-13
Payer: COMMERCIAL

## 2021-04-13 ENCOUNTER — HOSPITAL ENCOUNTER (OUTPATIENT)
Facility: HOSPITAL | Age: 36
Setting detail: OUTPATIENT SURGERY
Discharge: HOME | End: 2021-04-13
Attending: UROLOGY | Admitting: UROLOGY
Payer: COMMERCIAL

## 2021-04-13 VITALS
WEIGHT: 133 LBS | RESPIRATION RATE: 16 BRPM | OXYGEN SATURATION: 99 % | DIASTOLIC BLOOD PRESSURE: 73 MMHG | SYSTOLIC BLOOD PRESSURE: 123 MMHG | HEIGHT: 64 IN | TEMPERATURE: 98.6 F | HEART RATE: 90 BPM | BODY MASS INDEX: 22.71 KG/M2

## 2021-04-13 DIAGNOSIS — N31.9 NEUROGENIC BLADDER: Primary | ICD-10-CM

## 2021-04-13 DIAGNOSIS — R39.15 URINARY URGENCY: ICD-10-CM

## 2021-04-13 LAB
EXT PREGNANCY TEST URINE: NEGATIVE
EXT. CONTROL: NORMAL

## 2021-04-13 PROCEDURE — 81025 URINE PREGNANCY TEST: CPT | Performed by: UROLOGY

## 2021-04-13 PROCEDURE — 52287 CYSTOSCOPY CHEMODENERVATION: CPT | Performed by: UROLOGY

## 2021-04-13 PROCEDURE — 99024 POSTOP FOLLOW-UP VISIT: CPT | Performed by: UROLOGY

## 2021-04-13 RX ORDER — ONDANSETRON 2 MG/ML
INJECTION INTRAMUSCULAR; INTRAVENOUS AS NEEDED
Status: DISCONTINUED | OUTPATIENT
Start: 2021-04-13 | End: 2021-04-13

## 2021-04-13 RX ORDER — PROPOFOL 10 MG/ML
INJECTION, EMULSION INTRAVENOUS AS NEEDED
Status: DISCONTINUED | OUTPATIENT
Start: 2021-04-13 | End: 2021-04-13

## 2021-04-13 RX ORDER — FENTANYL CITRATE 50 UG/ML
INJECTION, SOLUTION INTRAMUSCULAR; INTRAVENOUS AS NEEDED
Status: DISCONTINUED | OUTPATIENT
Start: 2021-04-13 | End: 2021-04-13

## 2021-04-13 RX ORDER — HYDROCODONE BITARTRATE AND ACETAMINOPHEN 5; 325 MG/1; MG/1
TABLET ORAL
Qty: 10 TABLET | Refills: 0 | Status: SHIPPED | OUTPATIENT
Start: 2021-04-13 | End: 2021-11-01

## 2021-04-13 RX ORDER — FENTANYL CITRATE/PF 50 MCG/ML
25 SYRINGE (ML) INJECTION
Status: DISCONTINUED | OUTPATIENT
Start: 2021-04-13 | End: 2021-04-13 | Stop reason: HOSPADM

## 2021-04-13 RX ORDER — ONDANSETRON 2 MG/ML
4 INJECTION INTRAMUSCULAR; INTRAVENOUS ONCE AS NEEDED
Status: DISCONTINUED | OUTPATIENT
Start: 2021-04-13 | End: 2021-04-13 | Stop reason: HOSPADM

## 2021-04-13 RX ORDER — CEFAZOLIN SODIUM 2 G/50ML
2000 SOLUTION INTRAVENOUS ONCE
Status: COMPLETED | OUTPATIENT
Start: 2021-04-13 | End: 2021-04-13

## 2021-04-13 RX ORDER — OXYCODONE HYDROCHLORIDE AND ACETAMINOPHEN 5; 325 MG/1; MG/1
1 TABLET ORAL EVERY 4 HOURS PRN
Status: CANCELLED | OUTPATIENT
Start: 2021-04-13

## 2021-04-13 RX ORDER — OXYCODONE HYDROCHLORIDE AND ACETAMINOPHEN 5; 325 MG/1; MG/1
2 TABLET ORAL EVERY 4 HOURS PRN
Status: CANCELLED | OUTPATIENT
Start: 2021-04-13

## 2021-04-13 RX ORDER — MIDAZOLAM HYDROCHLORIDE 2 MG/2ML
INJECTION, SOLUTION INTRAMUSCULAR; INTRAVENOUS AS NEEDED
Status: DISCONTINUED | OUTPATIENT
Start: 2021-04-13 | End: 2021-04-13

## 2021-04-13 RX ORDER — SODIUM CHLORIDE 9 MG/ML
125 INJECTION, SOLUTION INTRAVENOUS CONTINUOUS
Status: DISCONTINUED | OUTPATIENT
Start: 2021-04-13 | End: 2021-04-13 | Stop reason: HOSPADM

## 2021-04-13 RX ADMIN — SODIUM CHLORIDE 125 ML/HR: 0.9 INJECTION, SOLUTION INTRAVENOUS at 07:47

## 2021-04-13 RX ADMIN — PROPOFOL 200 MG: 10 INJECTION, EMULSION INTRAVENOUS at 08:24

## 2021-04-13 RX ADMIN — MIDAZOLAM 2 MG: 1 INJECTION INTRAMUSCULAR; INTRAVENOUS at 08:20

## 2021-04-13 RX ADMIN — SODIUM CHLORIDE: 0.9 INJECTION, SOLUTION INTRAVENOUS at 08:48

## 2021-04-13 RX ADMIN — LIDOCAINE HYDROCHLORIDE 100 MG: 20 INJECTION, SOLUTION INTRAVENOUS at 08:24

## 2021-04-13 RX ADMIN — FENTANYL CITRATE 50 MCG: 50 INJECTION INTRAMUSCULAR; INTRAVENOUS at 08:30

## 2021-04-13 RX ADMIN — ONDANSETRON 4 MG: 2 INJECTION INTRAMUSCULAR; INTRAVENOUS at 08:24

## 2021-04-13 RX ADMIN — CEFAZOLIN SODIUM 2000 MG: 2 SOLUTION INTRAVENOUS at 08:15

## 2021-04-13 NOTE — OP NOTE
OPERATIVE REPORT  PATIENT NAME: Radha Ames    :  1985  MRN: [id number]  Pt Location: [facility] OR ROOM 06    SURGERY DATE: 2021    Surgeon(s) and Role:     * Janice Haq MD - Primary    Preop Diagnosis:  Urge incontinence [N39 41]    Post-Op Diagnosis Codes:     * Urge incontinence [N39 41]    Procedure(s) (LRB):  INJECTION BOTULINUM TOXIN (BOTOX), cystoscopy (N/A)    Specimen(s):  * No specimens in log *    Estimated Blood Loss:   Minimal    Drains:  * No LDAs found *    Anesthesia Type:   General/LMA    Operative Indications:  Urge incontinence [N39 41]      Operative Findings:  Normal appearing bladder  200 units of Botox, in 30 mL normal saline injected in appropriate positions in grid fashion posterior wall    Complications:   None    Procedure and Technique:  After the patient was placed under adequate general anesthesia, she was prepped and draped using chlorhexidine solution in lithotomy position  The 25 [place]onian scope was passed without difficulty urethra had no stenosis  The bladder was normal except for mild muscular trabeculations    0 2 mL methylene blue was injected in five different spots on the posterior wall, going from trigone up towards the dome  This is used to then  where to put the Botox  The Botox was then injected, 30 different injections, 1 mL each in a grid like fashion six across was only, five vertically  Minimal bleeding was encountered    At the end the procedure, scope was removed, no Jonas needed, patient taken to recovery good condition   I was present for the entire procedure    Patient Disposition:  PACU     SIGNATURE: Janice Haq MD  DATE: 2021  TIME: 8:56 AM

## 2021-04-13 NOTE — DISCHARGE INSTRUCTIONS
ALL YOUR  PREVIOUS MEDS ARE THE SAME  ** EXCEPT, DO NOT TAKE SOLIFENACIN, OR ANY OTHER BLADDER MUSCLE RELAXANTS UNTIL YOU SEE PRICE    NEW MEDS:  Hydrocodone if needed for pain    EXPECT SOME BLOOD IN URINE, BURNING, FREQUENT URINATION  ACTIVITY:  RESUME FULL ACTIVITY tomorrow

## 2021-04-13 NOTE — DISCHARGE SUMMARY
Discharge Summary - Nila Llamas 37 y o  female MRN: [id number]    Admission Date: 4/13/2021     Admitting Diagnosis: Urge incontinence [N39 41]    Procedures Performed:  Cysto, Botox installation    Patient underwent planned outpt surgery and recovered without complication  Discharged in good condition  Medications were prescribed  Pt knows to have office follow-up at the appropriate time

## 2021-04-13 NOTE — INTERVAL H&P NOTE
H&P reviewed  After examining the patient I find no changes in the patients condition since the H&P had been written      Vitals:    04/13/21 0729   BP: 127/76   Pulse: 92   Resp: 14   Temp: 98 8 °F (37 1 °C)   SpO2: 97%

## 2021-04-13 NOTE — ANESTHESIA POSTPROCEDURE EVALUATION
Post-Op Assessment Note    CV Status:  Stable  Pain Score: 2    Pain management: adequate     Mental Status:  Alert and awake   Hydration Status:  Euvolemic   PONV Controlled:  Controlled   Airway Patency:  Patent      Post Op Vitals Reviewed: Yes      Staff: Anesthesiologist         No complications documented      /79 (04/13/21 0901)    Temp 97 8 °F (36 6 °C) (04/13/21 0901)    Pulse (!) 110 (04/13/21 0901)   Resp 16 (04/13/21 0901)    SpO2 96 % (04/13/21 0901)

## 2021-04-13 NOTE — ANESTHESIA PREPROCEDURE EVALUATION
Procedure:  INJECTION BOTULINUM TOXIN (BOTOX), cystoscopy (N/A Urethra)    Relevant Problems   MUSCULOSKELETAL   (+) Chronic midline thoracic back pain      NEURO/PSYCH   (+) Chronic midline thoracic back pain   (+) H/O spinal cord injury   (+) Injury to L1 level of spinal cord [facility])        Physical Exam    Airway    Mallampati score: II  TM Distance: <3 FB  Neck ROM: full     Dental       Cardiovascular  Rhythm: regular, Rate: normal,     Pulmonary  Breath sounds clear to auscultation,     Other Findings        Anesthesia Plan  ASA Score- 2     Anesthesia Type- general with ASA Monitors  Additional Monitors:   Airway Plan:           Plan Factors-Exercise tolerance (METS): >4 METS  Chart reviewed  Existing labs reviewed  Patient summary reviewed  Patient is not a current smoker  Patient not instructed to abstain from smoking on day of procedure  Patient did not smoke on day of surgery  Obstructive sleep apnea risk education given perioperatively  Induction- intravenous  Postoperative Plan-     Informed Consent- Anesthetic plan and risks discussed with patient

## 2021-05-21 ENCOUNTER — OFFICE VISIT (OUTPATIENT)
Dept: UROLOGY | Facility: HOSPITAL | Age: 36
End: 2021-05-21
Payer: COMMERCIAL

## 2021-05-21 VITALS
WEIGHT: 134 LBS | HEART RATE: 72 BPM | BODY MASS INDEX: 22.88 KG/M2 | SYSTOLIC BLOOD PRESSURE: 102 MMHG | HEIGHT: 64 IN | DIASTOLIC BLOOD PRESSURE: 68 MMHG

## 2021-05-21 DIAGNOSIS — N30.00 ACUTE CYSTITIS WITHOUT HEMATURIA: ICD-10-CM

## 2021-05-21 DIAGNOSIS — N39.41 URGE INCONTINENCE: ICD-10-CM

## 2021-05-21 DIAGNOSIS — R39.15 URINARY URGENCY: Primary | ICD-10-CM

## 2021-05-21 LAB
SL AMB  POCT GLUCOSE, UA: NORMAL
SL AMB LEUKOCYTE ESTERASE,UA: NORMAL
SL AMB POCT BILIRUBIN,UA: NORMAL
SL AMB POCT BLOOD,UA: NORMAL
SL AMB POCT CLARITY,UA: CLEAR
SL AMB POCT COLOR,UA: YELLOW
SL AMB POCT KETONES,UA: NORMAL
SL AMB POCT NITRITE,UA: NORMAL
SL AMB POCT PH,UA: 5
SL AMB POCT SPECIFIC GRAVITY,UA: 1.01
SL AMB POCT URINE PROTEIN: NORMAL
SL AMB POCT UROBILINOGEN: 0.2

## 2021-05-21 PROCEDURE — 81002 URINALYSIS NONAUTO W/O SCOPE: CPT | Performed by: UROLOGY

## 2021-05-21 PROCEDURE — 87147 CULTURE TYPE IMMUNOLOGIC: CPT | Performed by: UROLOGY

## 2021-05-21 PROCEDURE — 87086 URINE CULTURE/COLONY COUNT: CPT | Performed by: UROLOGY

## 2021-05-21 PROCEDURE — 99213 OFFICE O/P EST LOW 20 MIN: CPT | Performed by: UROLOGY

## 2021-05-21 RX ORDER — NITROFURANTOIN 25; 75 MG/1; MG/1
100 CAPSULE ORAL 2 TIMES DAILY
Qty: 14 CAPSULE | Refills: 0 | Status: SHIPPED | OUTPATIENT
Start: 2021-05-21 | End: 2021-11-01

## 2021-05-21 NOTE — PROGRESS NOTES
HISTORY:    1  Now five weeks out from Botox,  has helped to some degree, perhaps not as much as prior treatments  Can hold the urine longer, less sense of urgency, using less pads but still having significant urge incontinence    2  Urethral pain, she thinks perhaps that is a sign of infection, does not usually get dysuria when she has a UTI  Last positive culture was in August 2020  ASSESSMENT / PLAN:    UA is clear, I will cultured, and start Macrobid twice per day seven days    We will assess at the three month point whether this Botox help enough  If not, consider retreatment    The following portions of the patient's history were reviewed and updated as appropriate: allergies, current medications, past family history, past medical history, past social history, past surgical history and problem list     Review of Systems   All other systems reviewed and are negative          Objective:     Physical Exam  Abdominal:      Comments: Soft           No results found for: PSA]  BUN   Date Value Ref Range Status   04/07/2021 13 5 - 25 mg/dL Final   03/06/2018 12 7 - 25 mg/dL Final     Creatinine   Date Value Ref Range Status   04/07/2021 0 64 0 60 - 1 30 mg/dL Final     Comment:     Standardized to IDMS reference method   06/21/2017 0 62 0 50 - 1 10 mg/dL Final     No components found for: CBC      Patient Active Problem List   Diagnosis    Paronychia of toe of right foot    Constipation    H/O spinal cord injury    Chronic midline thoracic back pain    Low vitamin B12 level    Vegetarian diet    Injury to L1 level of spinal cord (HCC)    Bipolar affective disorder (HCC)    Lumbar radiculopathy    Myofascial pain    Urinary urgency    Night sweats    Snoring    Urge incontinence    Neurogenic bladder    Nausea    Heartburn    Throat clearing    Drop foot gait        Diagnoses and all orders for this visit:    Urinary urgency    Urge incontinence    Acute cystitis without hematuria  - nitrofurantoin (MACROBID) 100 mg capsule; Take 1 capsule (100 mg total) by mouth 2 (two) times a day  -     Urine culture           Patient ID: Asher Sorto is a 37 y o  female  Current Outpatient Medications:     busPIRone (BUSPAR) 10 mg tablet, Take 1 tablet by mouth 3 (three) times a day, Disp: , Rfl:     famotidine (PEPCID) 40 MG tablet, TAKE 1 TABLET BY MOUTH EVERY DAY (Patient taking differently: Take by mouth daily as needed ), Disp: 30 tablet, Rfl: 2    gabapentin (NEURONTIN) 100 mg capsule, Take 1 capsule (100 mg total) by mouth 3 (three) times a day (Patient taking differently: Take 300 mg by mouth 2 (two) times a day ), Disp: 90 capsule, Rfl: 1    HYDROcodone-acetaminophen (NORCO) 5-325 mg per tablet, 1-2 tab every 6 hr if needed for pain, Disp: 10 tablet, Rfl: 0    hydrOXYzine HCL (ATARAX) 10 mg tablet, as needed, Disp: , Rfl:     levonorgestrel (MIRENA) 20 MCG/24HR IUD, 20 mcg/day, Disp: , Rfl:     sertraline (ZOLOFT) 100 mg tablet, Take 100 mg by mouth every morning, Disp: , Rfl: 0    nitrofurantoin (MACROBID) 100 mg capsule, Take 1 capsule (100 mg total) by mouth 2 (two) times a day, Disp: 14 capsule, Rfl: 0    Current Facility-Administered Medications:     Botulinum Toxin Type A SOLR 200 Units, 200 Units, Injection, Once, Javon Yo MD    Past Medical History:   Diagnosis Date    Anxiety     Chronic pain disorder     back - thoracic area    Depression     Foot drop     Bilateral    History of spinal cord injury     at L1    Mild acid reflux     Small bowel problem     Chronic Constipation    Urge incontinence     Urinary incontinence May 30 2013    Use of cane as ambulatory aid     nguyen leg braces       Past Surgical History:   Procedure Laterality Date    BOTOX INJECTION N/A 7/29/2019    Procedure: INJECTION BOTULINUM TOXIN (BOTOX);   Surgeon: Cait Olivia MD;  [facility];  Service: Urology    BOTOX INJECTION N/A 4/13/2021    Procedure: INJECTION BOTULINUM TOXIN (BOTOX), cystoscopy;  Surgeon: Galdino Costa MD;  [facility];  Service: Urology    COLONOSCOPY      CYSTOSCOPY      Diagnostic; onset: 1/3/17    FL MYELOGRAM 2 OR MORE REGIONS  9/6/2018    OK COLONOSCOPY FLX DX W/COLLJ SPEC WHEN PFRMD N/A 12/12/2017    Procedure: COLONOSCOPY;  Surgeon: Joon Hastings MD;  [facility]  GI LAB;   Service: Gastroenterology    OK CYSTOURETHROSCOPY N/A 7/29/2019    Procedure: Yoav Pierce;  Surgeon: Anthony Marshall MD;  [facility];  Service: Urology    [facility]AL F[facility]ION      [facility]   [address] [facility]  [facility]       Social History

## 2021-05-23 LAB
BACTERIA UR CULT: ABNORMAL
BACTERIA UR CULT: ABNORMAL

## 2021-06-03 ENCOUNTER — TELEPHONE (OUTPATIENT)
Dept: UROLOGY | Facility: MEDICAL CENTER | Age: 36
End: 2021-06-03

## 2021-06-03 NOTE — TELEPHONE ENCOUNTER
Patient left a message on the Medication Refill voice mail line requesting a new prescription for Macrobid  Patient states that she took her last pills two days ago and is still experiencing urinary frequency/urgency  She did offer that she was inconsistently taking her medication, sometimes only taking 1 pill per day  She is requesting another cycle of medication

## 2021-06-08 ENCOUNTER — TELEMEDICINE (OUTPATIENT)
Dept: FAMILY MEDICINE CLINIC | Facility: HOSPITAL | Age: 36
End: 2021-06-08
Payer: COMMERCIAL

## 2021-06-08 VITALS — BODY MASS INDEX: 22.88 KG/M2 | HEIGHT: 64 IN | WEIGHT: 134 LBS | TEMPERATURE: 98.7 F

## 2021-06-08 DIAGNOSIS — J01.90 ACUTE NON-RECURRENT SINUSITIS, UNSPECIFIED LOCATION: Primary | ICD-10-CM

## 2021-06-08 PROCEDURE — 99213 OFFICE O/P EST LOW 20 MIN: CPT | Performed by: INTERNAL MEDICINE

## 2021-06-08 RX ORDER — DOXYCYCLINE HYCLATE 100 MG/1
100 CAPSULE ORAL EVERY 12 HOURS SCHEDULED
Qty: 14 CAPSULE | Refills: 0 | Status: SHIPPED | OUTPATIENT
Start: 2021-06-08 | End: 2021-06-15

## 2021-06-08 NOTE — PROGRESS NOTES
COVID-19 Outpatient Progress Note    Assessment/Plan:    Problem List Items Addressed This Visit     None      Visit Diagnoses     Acute non-recurrent sinusitis, unspecified location    -  Primary    Reassured pt that s/x more c/w URI then COVID - plus she is fully vaccinated and has had no known COVID exposures, start Doxy 100 mg bid x 7 days, encouraged to con't OTC decongestant as well as garles/hot tea/lozenges/rest/fluids, call with new/worse symptoms or with any red flag COVID symptoms/fevers    Relevant Medications    doxycycline hyclate (VIBRAMYCIN) 100 mg capsule         Disposition:     After clarifying the patient's history, my suspicion for COVID-19 infection is very low  I have spent 10 minutes directly with the patient  Greater than 50% of this time was spent in counseling/coordination of care regarding: instructions for management, patient and family education, importance of treatment compliance, risk factor reductions and impressions  Encounter provider Tory Curtis DO    Provider located at [address] [place] [address]  [address] [place] [address]    Recent Visits  No visits were found meeting these conditions  Showing recent visits within past 7 days and meeting all other requirements     Today's Visits  Date Type Provider Dept   06/08/21 101 Juany Amaya DO [facility]   Showing today's visits and meeting all other requirements     Future Appointments  No visits were found meeting these conditions  Showing future appointments within next 150 days and meeting all other requirements      This virtual check-in was done via [employer] and patient was informed that this is a secure, HIPAA-compliant platform  She agrees to proceed  Patient agrees to participate in a virtual check in via telephone or video visit instead of presenting to the office to address urgent/immediate medical needs   Patient is aware this is a billable service  After connecting through [facility], the patient was identified by name and date of birth  Vikas Edwards was informed that this was a telemedicine visit and that the exam was being conducted confidentially over secure lines  My office door was closed  No one else was in the room  Vikas Edwards acknowledged consent and understanding of privacy and security of the telemedicine visit  I informed the patient that I have reviewed her record in Epic and presented the opportunity for her to ask any questions regarding the visit today  The patient agreed to participate  Subjective:   Vikas Edwards is a 37 y o  female who is concerned about COVID-19  Patient's symptoms include fatigue, nasal congestion, rhinorrhea, sore throat, cough, myalgias and headache  Patient denies fever, chills, anosmia, loss of taste, shortness of breath, chest tightness, abdominal pain, nausea, vomiting and diarrhea  Date of symptom onset: 6/4/2021    Exposure:   Contact with a person who is under investigation (PUI) for or who is positive for COVID-19 within the last 14 days?: No    Hospitalized recently for fever and/or lower respiratory symptoms?: No      Currently a healthcare worker that is involved in direct patient care?: No      Works in a special setting where the risk of COVID-19 transmission may be high? (this may include long-term care, correctional and [facility] facilities; homeless shelters; assisted-living facilities and group homes ): No      Resident in a special setting where the risk of COVID-19 transmission may be high? (this may include long-term care, correctional and [facility] facilities; homeless shelters; assisted-living facilities and group homes ): No      Pt with c/o 4 days of congestion, sinus pressure and fullness and ST  She noted some redness to the R eye  She notes no drainage but has had some itching and mild crusting to the R eye  She notes no recent travel   She has had both COVID vaccine's  She notes no known COVID exposures  She has been using an OTC decongestant    No results found for: Lara Gamboa, SAMANTHARONGEORGE, [address]  Past Medical History:   Diagnosis Date    Anxiety     Chronic pain disorder     back - thoracic area    Depression     Foot drop     Bilateral    History of spinal cord injury     at L1    Mild acid reflux     Small bowel problem     Chronic Constipation    Urge incontinence     Urinary incontinence May 30 2013    Use of cane as ambulatory aid     nguyen leg braces     Past Surgical History:   Procedure Laterality Date    BOTOX INJECTION N/A 7/29/2019    Procedure: INJECTION BOTULINUM TOXIN (BOTOX); Surgeon: Mya Win MD;  [facility];  Service: Urology    BOTOX INJECTION N/A 4/13/2021    Procedure: INJECTION BOTULINUM TOXIN (BOTOX), cystoscopy;  Surgeon: Cindy Potter MD;  [facility];  Service: Urology    COLONOSCOPY      CYSTOSCOPY      Diagnostic; onset: 1/3/17    FL MYELOGRAM 2 OR MORE REGIONS  9/6/2018    AZ COLONOSCOPY FLX DX W/COLLJ SPEC WHEN PFRMD N/A 12/12/2017    Procedure: COLONOSCOPY;  Surgeon: Tiago Ribera MD;  [facility];   Service: Gastroenterology    AZ CYSTOURETHROSCOPY N/A 7/29/2019    Procedure: CYSTOSCOPY;  Surgeon: Mya Win MD;  [facility];  Service: Urology    SPINAL FUSION      Lumbar    SPINAL FUSION  2013     Current Outpatient Medications   Medication Sig Dispense Refill    busPIRone (BUSPAR) 10 mg tablet Take 1 tablet by mouth 3 (three) times a day      doxycycline hyclate (VIBRAMYCIN) 100 mg capsule Take 1 capsule (100 mg total) by mouth every 12 (twelve) hours for 7 days 14 capsule 0    famotidine (PEPCID) 40 MG tablet TAKE 1 TABLET BY MOUTH EVERY DAY (Patient taking differently: Take by mouth daily as needed ) 30 tablet 2    gabapentin (NEURONTIN) 100 mg capsule Take 1 capsule (100 mg total) by mouth 3 (three) times a day (Patient taking differently: Take 300 mg by mouth 2 (two) times a day ) 90 capsule 1    HYDROcodone-acetaminophen (NORCO) 5-325 mg per tablet 1-2 tab every 6 hr if needed for pain 10 tablet 0    hydrOXYzine HCL (ATARAX) 10 mg tablet as needed      levonorgestrel (MIRENA) 20 MCG/24HR IUD 20 mcg/day      nitrofurantoin (MACROBID) 100 mg capsule Take 1 capsule (100 mg total) by mouth 2 (two) times a day 14 capsule 0    sertraline (ZOLOFT) 100 mg tablet Take 100 mg by mouth every morning  0     Current Facility-Administered Medications   Medication Dose Route Frequency Provider Last Rate Last Admin    Botulinum Toxin Type A SOLR 200 Units  200 Units Injection Once Kyara Tesfaye MD         Allergies   Allergen Reactions    Sulfamethoxazole Nausea Only and GI Intolerance       Review of Systems   Constitutional: Positive for fatigue  Negative for appetite change, chills and fever  HENT: Positive for congestion, rhinorrhea and sore throat  Eyes: Positive for discharge, redness and itching  Negative for photophobia  Respiratory: Positive for cough  Negative for chest tightness and shortness of breath  Gastrointestinal: Negative for abdominal pain, diarrhea, nausea and vomiting  Musculoskeletal: Positive for myalgias  Skin: Negative for rash  Neurological: Positive for headaches  Objective:    Vitals:    06/08/21 1422   Temp: 98 7 °F (37 1 °C)   Weight: 60 8 kg (134 lb)   Height: 5' 4" (1 626 m)       Physical Exam  Vitals signs and nursing note reviewed  Constitutional:       General: She is not in acute distress  Appearance: She is not ill-appearing  HENT:      Head: Normocephalic and atraumatic  Eyes:      General:         Right eye: No discharge  Left eye: No discharge  Conjunctiva/sclera: Conjunctivae normal    Pulmonary:      Effort: Pulmonary effort is normal  No respiratory distress  Skin:     Coloration: Skin is not pale  Findings: No rash     Psychiatric:         Mood and Affect: Mood normal          Behavior: Behavior normal          Thought Content: Thought content normal          Judgment: Judgment normal        VIRTUAL VISIT DISCLAIMER    Carmen Rodriguez acknowledges that she has consented to an online visit or consultation  She understands that the online visit is based solely on information provided by her, and that, in the absence of a face-to-face physical evaluation by the physician, the diagnosis she receives is both limited and provisional in terms of accuracy and completeness  This is not intended to replace a full medical face-to-face evaluation by the physician  Carmen Rodriguez understands and accepts these terms

## 2021-06-09 NOTE — TELEPHONE ENCOUNTER
It appears patient was prescribed doxycycline by another provider so we will hold off on macrobid at this time

## 2021-07-01 ENCOUNTER — TELEPHONE (OUTPATIENT)
Dept: UROLOGY | Facility: MEDICAL CENTER | Age: 36
End: 2021-07-01

## 2021-07-01 DIAGNOSIS — N39.0 URINARY TRACT INFECTION WITHOUT HEMATURIA, SITE UNSPECIFIED: Primary | ICD-10-CM

## 2021-07-01 NOTE — TELEPHONE ENCOUNTER
Patient of Dr Dolly Gallagher  Patient believes she has uti for over a week  Patient is having frequency along with abdominal pain

## 2021-07-01 NOTE — TELEPHONE ENCOUNTER
Called patient - she is told to go for urine testing  We will check on preliminary report tomorrow to see if antibiotic can be started tomorrow

## 2021-07-02 ENCOUNTER — LAB (OUTPATIENT)
Dept: LAB | Facility: HOSPITAL | Age: 36
End: 2021-07-02
Attending: UROLOGY
Payer: COMMERCIAL

## 2021-07-02 DIAGNOSIS — N39.0 URINARY TRACT INFECTION WITHOUT HEMATURIA, SITE UNSPECIFIED: ICD-10-CM

## 2021-07-02 LAB
BACTERIA UR QL AUTO: NORMAL /HPF
BILIRUB UR QL STRIP: NEGATIVE
CLARITY UR: NORMAL
COLOR UR: YELLOW
GLUCOSE UR STRIP-MCNC: NEGATIVE MG/DL
HGB UR QL STRIP.AUTO: NEGATIVE
KETONES UR STRIP-MCNC: NEGATIVE MG/DL
LEUKOCYTE ESTERASE UR QL STRIP: NEGATIVE
NITRITE UR QL STRIP: NEGATIVE
NON-SQ EPI CELLS URNS QL MICRO: NORMAL /HPF
PH UR STRIP.AUTO: 7 [PH]
PROT UR STRIP-MCNC: NEGATIVE MG/DL
RBC #/AREA URNS AUTO: NORMAL /HPF
SP GR UR STRIP.AUTO: 1.01 (ref 1–1.03)
UROBILINOGEN UR QL STRIP.AUTO: 0.2 E.U./DL
WBC #/AREA URNS AUTO: NORMAL /HPF

## 2021-07-02 PROCEDURE — 81001 URINALYSIS AUTO W/SCOPE: CPT

## 2021-07-02 PROCEDURE — 87086 URINE CULTURE/COLONY COUNT: CPT

## 2021-07-02 NOTE — TELEPHONE ENCOUNTER
Urine specimen has not be obtained at this time  Will check status later to see if patient submitted sample for testing

## 2021-07-04 LAB — BACTERIA UR CULT: NORMAL

## 2021-07-06 NOTE — TELEPHONE ENCOUNTER
Spoke to pt to advise UC resulted on 7/4/21 was negative  Pt states she is still  having burning with urination  Advised pt to try taking AZO and to drink 40-60 oz of water a day  If symptoms do not resolve in a few days, she was advised to call office  Pt has appt with Dr Franklin Orellana on 9/3/21

## 2021-09-28 ENCOUNTER — TELEPHONE (OUTPATIENT)
Dept: UROLOGY | Facility: HOSPITAL | Age: 36
End: 2021-09-28

## 2021-10-26 ENCOUNTER — OFFICE VISIT (OUTPATIENT)
Dept: UROLOGY | Facility: HOSPITAL | Age: 36
End: 2021-10-26
Payer: COMMERCIAL

## 2021-10-26 VITALS
DIASTOLIC BLOOD PRESSURE: 60 MMHG | WEIGHT: 134 LBS | SYSTOLIC BLOOD PRESSURE: 102 MMHG | BODY MASS INDEX: 22.88 KG/M2 | HEART RATE: 72 BPM | HEIGHT: 64 IN

## 2021-10-26 DIAGNOSIS — N39.41 URGE INCONTINENCE: Primary | ICD-10-CM

## 2021-10-26 LAB — POST-VOID RESIDUAL VOLUME, ML POC: 13 ML

## 2021-10-26 PROCEDURE — 99214 OFFICE O/P EST MOD 30 MIN: CPT | Performed by: UROLOGY

## 2021-10-26 PROCEDURE — 51798 US URINE CAPACITY MEASURE: CPT | Performed by: UROLOGY

## 2021-10-26 RX ORDER — SOLIFENACIN SUCCINATE 10 MG/1
10 TABLET, FILM COATED ORAL DAILY
COMMUNITY
Start: 2021-10-03 | End: 2022-02-07 | Stop reason: HOSPADM

## 2021-11-01 ENCOUNTER — OFFICE VISIT (OUTPATIENT)
Dept: FAMILY MEDICINE CLINIC | Facility: HOSPITAL | Age: 36
End: 2021-11-01
Payer: COMMERCIAL

## 2021-11-01 ENCOUNTER — TELEPHONE (OUTPATIENT)
Dept: UROLOGY | Facility: MEDICAL CENTER | Age: 36
End: 2021-11-01

## 2021-11-01 VITALS
HEIGHT: 64 IN | TEMPERATURE: 98 F | HEART RATE: 76 BPM | SYSTOLIC BLOOD PRESSURE: 112 MMHG | DIASTOLIC BLOOD PRESSURE: 80 MMHG | WEIGHT: 149.4 LBS | BODY MASS INDEX: 25.51 KG/M2

## 2021-11-01 DIAGNOSIS — Z23 ENCOUNTER FOR IMMUNIZATION: ICD-10-CM

## 2021-11-01 DIAGNOSIS — G89.29 CHRONIC BILATERAL THORACIC BACK PAIN: Primary | ICD-10-CM

## 2021-11-01 DIAGNOSIS — N31.9 NEUROGENIC BLADDER: ICD-10-CM

## 2021-11-01 DIAGNOSIS — E66.3 OVERWEIGHT (BMI 25.0-29.9): ICD-10-CM

## 2021-11-01 DIAGNOSIS — M54.6 CHRONIC BILATERAL THORACIC BACK PAIN: Primary | ICD-10-CM

## 2021-11-01 PROCEDURE — 90471 IMMUNIZATION ADMIN: CPT

## 2021-11-01 PROCEDURE — 90686 IIV4 VACC NO PRSV 0.5 ML IM: CPT

## 2021-11-01 PROCEDURE — 99214 OFFICE O/P EST MOD 30 MIN: CPT | Performed by: INTERNAL MEDICINE

## 2021-11-01 RX ORDER — CYCLOBENZAPRINE HCL 5 MG
5 TABLET ORAL 3 TIMES DAILY PRN
Qty: 30 TABLET | Refills: 0 | Status: SHIPPED | OUTPATIENT
Start: 2021-11-01

## 2021-11-01 RX ORDER — GABAPENTIN 300 MG/1
1 CAPSULE ORAL 3 TIMES DAILY
COMMUNITY
Start: 2021-10-27

## 2021-11-22 ENCOUNTER — EVALUATION (OUTPATIENT)
Dept: PHYSICAL THERAPY | Facility: CLINIC | Age: 36
End: 2021-11-22
Payer: COMMERCIAL

## 2021-11-22 DIAGNOSIS — M54.6 CHRONIC BILATERAL THORACIC BACK PAIN: Primary | ICD-10-CM

## 2021-11-22 DIAGNOSIS — G89.29 CHRONIC BILATERAL THORACIC BACK PAIN: Primary | ICD-10-CM

## 2021-11-22 PROCEDURE — 97110 THERAPEUTIC EXERCISES: CPT | Performed by: PHYSICAL THERAPIST

## 2021-11-22 PROCEDURE — 97161 PT EVAL LOW COMPLEX 20 MIN: CPT | Performed by: PHYSICAL THERAPIST

## 2021-11-29 ENCOUNTER — LAB (OUTPATIENT)
Dept: LAB | Facility: HOSPITAL | Age: 36
End: 2021-11-29
Attending: UROLOGY
Payer: COMMERCIAL

## 2021-11-29 DIAGNOSIS — N39.41 URGE INCONTINENCE: ICD-10-CM

## 2021-11-29 LAB
ALBUMIN SERPL BCP-MCNC: 3.7 G/DL (ref 3.5–5)
ALP SERPL-CCNC: 64 U/L (ref 46–116)
ALT SERPL W P-5'-P-CCNC: 29 U/L (ref 12–78)
ANION GAP SERPL CALCULATED.3IONS-SCNC: 5 MMOL/L (ref 4–13)
AST SERPL W P-5'-P-CCNC: 21 U/L (ref 5–45)
BASOPHILS # BLD AUTO: 0.04 THOUSANDS/ΜL (ref 0–0.1)
BASOPHILS NFR BLD AUTO: 1 % (ref 0–1)
BILIRUB SERPL-MCNC: 1 MG/DL (ref 0.2–1)
BUN SERPL-MCNC: 14 MG/DL (ref 5–25)
CALCIUM SERPL-MCNC: 9.3 MG/DL (ref 8.3–10.1)
CHLORIDE SERPL-SCNC: 106 MMOL/L (ref 100–108)
CO2 SERPL-SCNC: 26 MMOL/L (ref 21–32)
CREAT SERPL-MCNC: 0.6 MG/DL (ref 0.6–1.3)
EOSINOPHIL # BLD AUTO: 0.17 THOUSAND/ΜL (ref 0–0.61)
EOSINOPHIL NFR BLD AUTO: 4 % (ref 0–6)
ERYTHROCYTE [DISTWIDTH] IN BLOOD BY AUTOMATED COUNT: 12.4 % (ref 11.6–15.1)
GFR SERPL CREATININE-BSD FRML MDRD: 119 ML/MIN/1.73SQ M
GLUCOSE SERPL-MCNC: 81 MG/DL (ref 65–140)
HCT VFR BLD AUTO: 44.4 % (ref 34.8–46.1)
HGB BLD-MCNC: 14.9 G/DL (ref 11.5–15.4)
IMM GRANULOCYTES # BLD AUTO: 0.01 THOUSAND/UL (ref 0–0.2)
IMM GRANULOCYTES NFR BLD AUTO: 0 % (ref 0–2)
LYMPHOCYTES # BLD AUTO: 1.42 THOUSANDS/ΜL (ref 0.6–4.47)
LYMPHOCYTES NFR BLD AUTO: 32 % (ref 14–44)
MCH RBC QN AUTO: 31.8 PG (ref 26.8–34.3)
MCHC RBC AUTO-ENTMCNC: 33.6 G/DL (ref 31.4–37.4)
MCV RBC AUTO: 95 FL (ref 82–98)
MONOCYTES # BLD AUTO: 0.41 THOUSAND/ΜL (ref 0.17–1.22)
MONOCYTES NFR BLD AUTO: 9 % (ref 4–12)
NEUTROPHILS # BLD AUTO: 2.37 THOUSANDS/ΜL (ref 1.85–7.62)
NEUTS SEG NFR BLD AUTO: 54 % (ref 43–75)
NRBC BLD AUTO-RTO: 0 /100 WBCS
PLATELET # BLD AUTO: 222 THOUSANDS/UL (ref 149–390)
PMV BLD AUTO: 9.7 FL (ref 8.9–12.7)
POTASSIUM SERPL-SCNC: 3.8 MMOL/L (ref 3.5–5.3)
PROT SERPL-MCNC: 7.2 G/DL (ref 6.4–8.2)
RBC # BLD AUTO: 4.69 MILLION/UL (ref 3.81–5.12)
SODIUM SERPL-SCNC: 137 MMOL/L (ref 136–145)
WBC # BLD AUTO: 4.42 THOUSAND/UL (ref 4.31–10.16)

## 2021-11-29 PROCEDURE — 36415 COLL VENOUS BLD VENIPUNCTURE: CPT

## 2021-11-29 PROCEDURE — 87086 URINE CULTURE/COLONY COUNT: CPT

## 2021-11-29 PROCEDURE — 85025 COMPLETE CBC W/AUTO DIFF WBC: CPT

## 2021-11-29 PROCEDURE — 80053 COMPREHEN METABOLIC PANEL: CPT

## 2021-11-30 ENCOUNTER — OFFICE VISIT (OUTPATIENT)
Dept: PHYSICAL THERAPY | Facility: CLINIC | Age: 36
End: 2021-11-30
Payer: COMMERCIAL

## 2021-11-30 DIAGNOSIS — M54.6 CHRONIC BILATERAL THORACIC BACK PAIN: Primary | ICD-10-CM

## 2021-11-30 DIAGNOSIS — G89.29 CHRONIC BILATERAL THORACIC BACK PAIN: Primary | ICD-10-CM

## 2021-11-30 PROCEDURE — 97110 THERAPEUTIC EXERCISES: CPT | Performed by: PHYSICAL THERAPIST

## 2021-11-30 PROCEDURE — 97112 NEUROMUSCULAR REEDUCATION: CPT | Performed by: PHYSICAL THERAPIST

## 2021-12-01 LAB — BACTERIA UR CULT: NORMAL

## 2021-12-03 ENCOUNTER — TELEPHONE (OUTPATIENT)
Dept: UROLOGY | Facility: AMBULATORY SURGERY CENTER | Age: 36
End: 2021-12-03

## 2021-12-07 ENCOUNTER — APPOINTMENT (OUTPATIENT)
Dept: PHYSICAL THERAPY | Facility: CLINIC | Age: 36
End: 2021-12-07
Payer: COMMERCIAL

## 2021-12-13 ENCOUNTER — TELEPHONE (OUTPATIENT)
Dept: UROLOGY | Facility: AMBULATORY SURGERY CENTER | Age: 36
End: 2021-12-13

## 2021-12-14 ENCOUNTER — OFFICE VISIT (OUTPATIENT)
Dept: PHYSICAL THERAPY | Facility: CLINIC | Age: 36
End: 2021-12-14
Payer: COMMERCIAL

## 2021-12-14 DIAGNOSIS — G89.29 CHRONIC BILATERAL THORACIC BACK PAIN: Primary | ICD-10-CM

## 2021-12-14 DIAGNOSIS — M54.6 CHRONIC BILATERAL THORACIC BACK PAIN: Primary | ICD-10-CM

## 2021-12-14 PROCEDURE — 97110 THERAPEUTIC EXERCISES: CPT | Performed by: PHYSICAL THERAPIST

## 2021-12-14 PROCEDURE — 97112 NEUROMUSCULAR REEDUCATION: CPT | Performed by: PHYSICAL THERAPIST

## 2021-12-21 ENCOUNTER — OFFICE VISIT (OUTPATIENT)
Dept: PHYSICAL THERAPY | Facility: CLINIC | Age: 36
End: 2021-12-21
Payer: COMMERCIAL

## 2021-12-21 DIAGNOSIS — G89.29 CHRONIC BILATERAL THORACIC BACK PAIN: Primary | ICD-10-CM

## 2021-12-21 DIAGNOSIS — M54.6 CHRONIC BILATERAL THORACIC BACK PAIN: Primary | ICD-10-CM

## 2021-12-21 PROCEDURE — 97110 THERAPEUTIC EXERCISES: CPT | Performed by: PHYSICAL THERAPIST

## 2021-12-28 ENCOUNTER — APPOINTMENT (OUTPATIENT)
Dept: PHYSICAL THERAPY | Facility: CLINIC | Age: 36
End: 2021-12-28
Payer: COMMERCIAL

## 2022-01-04 ENCOUNTER — OFFICE VISIT (OUTPATIENT)
Dept: PHYSICAL THERAPY | Facility: CLINIC | Age: 37
End: 2022-01-04
Payer: COMMERCIAL

## 2022-01-04 DIAGNOSIS — M54.6 CHRONIC BILATERAL THORACIC BACK PAIN: Primary | ICD-10-CM

## 2022-01-04 DIAGNOSIS — G89.29 CHRONIC BILATERAL THORACIC BACK PAIN: Primary | ICD-10-CM

## 2022-01-04 PROCEDURE — 97110 THERAPEUTIC EXERCISES: CPT

## 2022-01-04 NOTE — PROGRESS NOTES
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 without having to rest        Objective: See treatment diary below      Assessment: Pt  Continues to show good progress  She showed good understanding of her program and has good form t/o  Off balance with standing TB exercises, even with staggered stance  Endurance is improving- less fatigue with increased reps  Tolerated treatment well  Patient demonstrated fatigue post treatment and would benefit from continued PT      Plan: Continue per plan of care  Update HEP nv  Precautions: h/o lumbar injury with T12-L2 fusion, bilat foot drop, AFO use and SPC use, urinary urgency, ADD  Personal factors: works at [employer], enjoys [employer] and [employer] - [id number] [facility] [place]  Pt goals: get stronger so she can do more things  EPOC: 22  F/u with referring: n/a    HEP:  Access Code: [email or web address]  URL: [email or web address]  Date: 2021  Prepared by: Otto Hallmark    Exercises  Shoulder extension with resistance - Neutral - 20 reps - 2 sets  Standing Shoulder Row with Anchored Resistance - 20 reps - 2 sets  Shoulder External Rotation and Scapular Retraction with Resistance - 20 reps - 2 sets  Standing Shoulder Horizontal Abduction with Resistance - 20 reps - 2 sets  Cat-Camel - 10 reps  Quadruped Full Range Thoracic Rotation with Reach - 10 reps               Manuals                                                                 Neuro Re-Ed             Prone squeeze  5"x15 5"x15 5"x15 5"x20        Prone ATY   2x10 2x10 10x2                                                                         Ther Ex             UBE  3'/3' 3'/3' 3'/3' 3'/3'        Sh' ext btb x20 otb 2x20 otb 2x20 gtb 2x20 gtb 2x20 Row  btb x20 otb 2x20 otb 2x20 gtb 2x20 gtb 2x20        ER pull apart btb x20 otb 2x20 otb 2x20 gtb 2x20 gtb 2x20        HABD pull apart btb x20 otb 2x20 otb 2x20 gtb 2x20 gtb 2x20        D1/D2 ext   otb 2x20 otb 2x20 otb 2x20        Shoulder press   5# 3x10 5# 3x10 5# 3x10        Thread the needle  x10  x10 x10 ea        Cat-cow  x10 x10 x10 x20        Barrel stretch  x20           Bear hug stretch   20"x3 20"x3 20"x3        tspine ext over chair   x20 x20 x20        serratus             shrugs   5# 2x20 5# 2x20 5#  2x20                                  Ther Activity                                       Gait Training                                       Modalities

## 2022-01-25 ENCOUNTER — PATIENT MESSAGE (OUTPATIENT)
Dept: UROLOGY | Facility: HOSPITAL | Age: 37
End: 2022-01-25

## 2022-01-27 ENCOUNTER — LAB (OUTPATIENT)
Dept: LAB | Facility: HOSPITAL | Age: 37
End: 2022-01-27
Attending: UROLOGY
Payer: COMMERCIAL

## 2022-01-27 DIAGNOSIS — N39.41 URGE INCONTINENCE: ICD-10-CM

## 2022-01-27 PROCEDURE — 87086 URINE CULTURE/COLONY COUNT: CPT

## 2022-01-28 LAB — BACTERIA UR CULT: NORMAL

## 2022-02-04 PROCEDURE — NC001 PR NO CHARGE: Performed by: UROLOGY

## 2022-02-04 NOTE — H&P
HISTORY AND PHYSICAL  ? ? Patient Name: Radha Ames  Patient MRN: [id number]  Attending Provider: Janice Haq MD  Service: Urology  Chief Complaint    Urgency incontinence    HPI   Radha Ames is a 31 y o  female with long history of above: Follow-up urgency incontinence  Botox six months ago, she thought at first it helped to some degree, but now she says it did not help        Treatment 1 5 years earlier did help to some degree        Recent urgency frequency burning, culture was negative, those symptoms are better  I plan cysto, Botox instillation  Potential risks and complications discussed, and informed consent was given by the patient  Medications  Meds/Allergies        Cannot display prior to admission medications because the patient has not been admitted in this contact         Current Facility-Administered Medications:     Botulinum Toxin Type A SOLR 200 Units, 200 Units, Injection, Once, Janice Haq MD    Current Outpatient Medications:     busPIRone (BUSPAR) 10 mg tablet, Take 1 tablet by mouth 3 (three) times a day, Disp: , Rfl:     cyclobenzaprine (FLEXERIL) 5 mg tablet, Take 1 tablet (5 mg total) by mouth 3 (three) times a day as needed for muscle spasms, Disp: 30 tablet, Rfl: 0    gabapentin (NEURONTIN) 300 mg capsule, Take 1 capsule by mouth 3 (three) times a day, Disp: , Rfl:     hydrOXYzine HCL (ATARAX) 10 mg tablet, As needed for anxiety , Disp: , Rfl:     levonorgestrel (MIRENA) 20 MCG/24HR IUD, 20 mcg/day, Disp: , Rfl:     sertraline (ZOLOFT) 100 mg tablet, Take 100 mg by mouth every morning, Disp: , Rfl: 0    solifenacin (VESICARE) 10 MG tablet, Take 10 mg by mouth daily, Disp: , Rfl:   Review of Systems  10 point review of systems negative except as noted in HPI  Allergies  Allergies   Allergen Reactions    Sulfamethoxazole Nausea Only and GI Intolerance     PMH  Past Medical History:   Diagnosis Date    Anxiety     Chronic pain disorder     back - thoracic area  Depression     Foot drop     Bilateral    History of spinal cord injury     at L1    Mild acid reflux     Small bowel problem     Chronic Constipation    Urge incontinence     Urinary incontinence May 30 2013    Use of cane as ambulatory aid     nguyen leg braces     Past surgical history  Past Surgical History:   Procedure Laterality Date    BOTOX INJECTION N/A 2019    Procedure: INJECTION BOTULINUM TOXIN (BOTOX); Surgeon: Romy Drew MD;  [facility];  Service: Urology    BOTOX INJECTION N/A 2021    Procedure: INJECTION BOTULINUM TOXIN (BOTOX), cystoscopy;  Surgeon: Sarabjit Lai MD;  [facility];  Service: Urology    COLONOSCOPY      CYSTOSCOPY      Diagnostic; onset: 1/3/17    FL MYELOGRAM 2 OR MORE REGIONS  2018    CT COLONOSCOPY FLX DX W/COLLJ SPEC WHEN PFRMD N/A 2017    Procedure: COLONOSCOPY;  Surgeon: Majo Gill MD;  [facility]; Service: Gastroenterology    CT CYSTOURETHROSCOPY N/A 2019    Procedure: Lizzy Lopez;  Surgeon: Romy Drew MD;  [facility];  Service: Urology    SPINAL FUSION      T12-L2     Social history  Social History     Tobacco Use    Smoking status: Former Smoker     Packs/day: 0 70     Years: 1 00     Pack years: 0 70     Types: Cigarettes     Quit date: 2016     Years since quittin 1    Smokeless tobacco: Never Used   Vaping Use    Vaping Use: Never used   Substance Use Topics    Alcohol use: Yes     Comment: liquor    Drug use: Not Currently     Types: Marijuana     ?   Physical Exam     vs  General appearance: alert and oriented, in no acute distress  Head: Normocephalic, without obvious abnormality, atraumatic  Neck: no adenopathy, no carotid bruit, no JVD, supple, symmetrical, trachea midline and thyroid not enlarged, symmetric, no tenderness/mass/nodules  Lungs: clear to auscultation bilaterally  Heart: regular rate and rhythm, S1, S2 normal, no murmur, click, rub or gallop  Abdomen: soft, non-tender; bowel sounds normal; no masses,  no organomegaly  Extremities: extremities normal, warm and well-perfused; no cyanosis, clubbing, or edema  Kyara Tesfaye MD

## 2022-02-07 ENCOUNTER — ANESTHESIA EVENT (OUTPATIENT)
Dept: PERIOP | Facility: HOSPITAL | Age: 37
End: 2022-02-07
Payer: COMMERCIAL

## 2022-02-07 ENCOUNTER — ANESTHESIA (OUTPATIENT)
Dept: PERIOP | Facility: HOSPITAL | Age: 37
End: 2022-02-07
Payer: COMMERCIAL

## 2022-02-07 ENCOUNTER — HOSPITAL ENCOUNTER (OUTPATIENT)
Facility: HOSPITAL | Age: 37
Setting detail: OUTPATIENT SURGERY
Discharge: HOME | End: 2022-02-07
Attending: UROLOGY | Admitting: UROLOGY
Payer: COMMERCIAL

## 2022-02-07 VITALS
RESPIRATION RATE: 22 BRPM | DIASTOLIC BLOOD PRESSURE: 74 MMHG | SYSTOLIC BLOOD PRESSURE: 133 MMHG | HEIGHT: 64 IN | HEART RATE: 85 BPM | WEIGHT: 154 LBS | OXYGEN SATURATION: 98 % | TEMPERATURE: 97.9 F | BODY MASS INDEX: 26.29 KG/M2

## 2022-02-07 DIAGNOSIS — N31.9 NEUROGENIC BLADDER: Primary | ICD-10-CM

## 2022-02-07 LAB
EXT PREGNANCY TEST URINE: NEGATIVE
EXT. CONTROL: NORMAL

## 2022-02-07 PROCEDURE — 99024 POSTOP FOLLOW-UP VISIT: CPT | Performed by: UROLOGY

## 2022-02-07 PROCEDURE — 52287 CYSTOSCOPY CHEMODENERVATION: CPT | Performed by: UROLOGY

## 2022-02-07 PROCEDURE — NC001 PR NO CHARGE: Performed by: UROLOGY

## 2022-02-07 PROCEDURE — 81025 URINE PREGNANCY TEST: CPT | Performed by: UROLOGY

## 2022-02-07 RX ORDER — MIDAZOLAM HYDROCHLORIDE 2 MG/2ML
INJECTION, SOLUTION INTRAMUSCULAR; INTRAVENOUS AS NEEDED
Status: DISCONTINUED | OUTPATIENT
Start: 2022-02-07 | End: 2022-02-07

## 2022-02-07 RX ORDER — OXYCODONE HYDROCHLORIDE AND ACETAMINOPHEN 5; 325 MG/1; MG/1
1 TABLET ORAL EVERY 4 HOURS PRN
Status: DISCONTINUED | OUTPATIENT
Start: 2022-02-07 | End: 2022-02-07 | Stop reason: HOSPADM

## 2022-02-07 RX ORDER — EPHEDRINE SULFATE 50 MG/ML
INJECTION INTRAVENOUS AS NEEDED
Status: DISCONTINUED | OUTPATIENT
Start: 2022-02-07 | End: 2022-02-07

## 2022-02-07 RX ORDER — MEPERIDINE HYDROCHLORIDE 25 MG/ML
12.5 INJECTION INTRAMUSCULAR; INTRAVENOUS; SUBCUTANEOUS
Status: DISCONTINUED | OUTPATIENT
Start: 2022-02-07 | End: 2022-02-07 | Stop reason: HOSPADM

## 2022-02-07 RX ORDER — HYDROMORPHONE HCL/PF 1 MG/ML
0.5 SYRINGE (ML) INJECTION
Status: DISCONTINUED | OUTPATIENT
Start: 2022-02-07 | End: 2022-02-07 | Stop reason: HOSPADM

## 2022-02-07 RX ORDER — DEXAMETHASONE SODIUM PHOSPHATE 4 MG/ML
INJECTION, SOLUTION INTRA-ARTICULAR; INTRALESIONAL; INTRAMUSCULAR; INTRAVENOUS; SOFT TISSUE AS NEEDED
Status: DISCONTINUED | OUTPATIENT
Start: 2022-02-07 | End: 2022-02-07

## 2022-02-07 RX ORDER — CEFAZOLIN SODIUM 2 G/50ML
2000 SOLUTION INTRAVENOUS ONCE
Status: COMPLETED | OUTPATIENT
Start: 2022-02-07 | End: 2022-02-07

## 2022-02-07 RX ORDER — ONDANSETRON 2 MG/ML
INJECTION INTRAMUSCULAR; INTRAVENOUS AS NEEDED
Status: DISCONTINUED | OUTPATIENT
Start: 2022-02-07 | End: 2022-02-07

## 2022-02-07 RX ORDER — OXYCODONE HYDROCHLORIDE AND ACETAMINOPHEN 5; 325 MG/1; MG/1
2 TABLET ORAL EVERY 4 HOURS PRN
Status: DISCONTINUED | OUTPATIENT
Start: 2022-02-07 | End: 2022-02-07 | Stop reason: HOSPADM

## 2022-02-07 RX ORDER — SODIUM CHLORIDE, SODIUM LACTATE, POTASSIUM CHLORIDE, CALCIUM CHLORIDE 600; 310; 30; 20 MG/100ML; MG/100ML; MG/100ML; MG/100ML
INJECTION, SOLUTION INTRAVENOUS CONTINUOUS PRN
Status: DISCONTINUED | OUTPATIENT
Start: 2022-02-07 | End: 2022-02-07

## 2022-02-07 RX ORDER — ALBUTEROL SULFATE 2.5 MG/3ML
2.5 SOLUTION RESPIRATORY (INHALATION) ONCE AS NEEDED
Status: DISCONTINUED | OUTPATIENT
Start: 2022-02-07 | End: 2022-02-07 | Stop reason: HOSPADM

## 2022-02-07 RX ORDER — FENTANYL CITRATE/PF 50 MCG/ML
25 SYRINGE (ML) INJECTION
Status: DISCONTINUED | OUTPATIENT
Start: 2022-02-07 | End: 2022-02-07 | Stop reason: HOSPADM

## 2022-02-07 RX ORDER — PROMETHAZINE HYDROCHLORIDE 25 MG/ML
12.5 INJECTION, SOLUTION INTRAMUSCULAR; INTRAVENOUS ONCE AS NEEDED
Status: DISCONTINUED | OUTPATIENT
Start: 2022-02-07 | End: 2022-02-07 | Stop reason: HOSPADM

## 2022-02-07 RX ORDER — PROPOFOL 10 MG/ML
INJECTION, EMULSION INTRAVENOUS AS NEEDED
Status: DISCONTINUED | OUTPATIENT
Start: 2022-02-07 | End: 2022-02-07

## 2022-02-07 RX ORDER — CEFUROXIME AXETIL 250 MG/1
250 TABLET ORAL EVERY 12 HOURS SCHEDULED
Qty: 10 TABLET | Refills: 0 | Status: SHIPPED | OUTPATIENT
Start: 2022-02-07 | End: 2022-02-12

## 2022-02-07 RX ORDER — FENTANYL CITRATE 50 UG/ML
INJECTION, SOLUTION INTRAMUSCULAR; INTRAVENOUS AS NEEDED
Status: DISCONTINUED | OUTPATIENT
Start: 2022-02-07 | End: 2022-02-07

## 2022-02-07 RX ORDER — ONDANSETRON 2 MG/ML
4 INJECTION INTRAMUSCULAR; INTRAVENOUS ONCE AS NEEDED
Status: DISCONTINUED | OUTPATIENT
Start: 2022-02-07 | End: 2022-02-07 | Stop reason: HOSPADM

## 2022-02-07 RX ADMIN — OXYCODONE HYDROCHLORIDE AND ACETAMINOPHEN 1 TABLET: 5; 325 TABLET ORAL at 15:55

## 2022-02-07 RX ADMIN — FENTANYL CITRATE 25 MCG: 50 INJECTION, SOLUTION INTRAMUSCULAR; INTRAVENOUS at 14:36

## 2022-02-07 RX ADMIN — FENTANYL CITRATE 100 MCG: 50 INJECTION, SOLUTION INTRAMUSCULAR; INTRAVENOUS at 13:26

## 2022-02-07 RX ADMIN — SODIUM CHLORIDE, SODIUM LACTATE, POTASSIUM CHLORIDE, AND CALCIUM CHLORIDE: .6; .31; .03; .02 INJECTION, SOLUTION INTRAVENOUS at 13:20

## 2022-02-07 RX ADMIN — PROPOFOL 100 MG: 10 INJECTION, EMULSION INTRAVENOUS at 13:29

## 2022-02-07 RX ADMIN — ONDANSETRON 4 MG: 2 INJECTION INTRAMUSCULAR; INTRAVENOUS at 13:20

## 2022-02-07 RX ADMIN — PROPOFOL 200 MG: 10 INJECTION, EMULSION INTRAVENOUS at 13:26

## 2022-02-07 RX ADMIN — EPHEDRINE SULFATE 5 MG: 50 INJECTION INTRAVENOUS at 13:49

## 2022-02-07 RX ADMIN — SODIUM CHLORIDE, SODIUM LACTATE, POTASSIUM CHLORIDE, AND CALCIUM CHLORIDE: .6; .31; .03; .02 INJECTION, SOLUTION INTRAVENOUS at 13:57

## 2022-02-07 RX ADMIN — DEXAMETHASONE SODIUM PHOSPHATE 4 MG: 4 INJECTION, SOLUTION INTRAMUSCULAR; INTRAVENOUS at 13:20

## 2022-02-07 RX ADMIN — LIDOCAINE HYDROCHLORIDE 50 MG: 20 INJECTION INTRAVENOUS at 13:26

## 2022-02-07 RX ADMIN — MIDAZOLAM 2 MG: 1 INJECTION INTRAMUSCULAR; INTRAVENOUS at 13:20

## 2022-02-07 RX ADMIN — EPHEDRINE SULFATE 5 MG: 50 INJECTION INTRAVENOUS at 14:05

## 2022-02-07 RX ADMIN — CEFAZOLIN SODIUM 2000 MG: 2 SOLUTION INTRAVENOUS at 12:18

## 2022-02-07 NOTE — ANESTHESIA PREPROCEDURE EVALUATION
Procedure:  CYSTOSCOPY, INJECTION BOTULINUM TOXIN (BOTOX) (N/A Urethra)    Relevant Problems   CARDIO   (+) Chronic bilateral thoracic back pain      MUSCULOSKELETAL   (+) Chronic bilateral thoracic back pain      NEURO/PSYCH   (+) Chronic bilateral thoracic back pain   (+) H/O spinal cord injury   (+) Injury to L1 level of spinal cord (HCC)      Other   (+) Drop foot gait   (+) Lumbar radiculopathy        Physical Exam    Airway    Mallampati score: II  TM Distance: >3 FB  Neck ROM: full     Dental       Cardiovascular  Cardiovascular exam normal    Pulmonary  Pulmonary exam normal     Other Findings        Anesthesia Plan  ASA Score- 3     Anesthesia Type- general with ASA Monitors  Additional Monitors:   Airway Plan: ETT  Plan Factors-Exercise tolerance (METS): >4 METS  Chart reviewed  EKG reviewed  Imaging results reviewed  Existing labs reviewed  Patient summary reviewed  Patient is not a current smoker  Patient did not smoke on day of surgery  Obstructive sleep apnea risk education given perioperatively  Induction- intravenous  Postoperative Plan- Plan for postoperative opioid use  Planned trial extubation    Informed Consent- Anesthetic plan and risks discussed with patient  I personally reviewed this patient with the CRNA  Discussed and agreed on the Anesthesia Plan with the CRNA  Refugio Hurtado

## 2022-02-07 NOTE — OP NOTE
PERATIVE REPORT  PATIENT NAME: Opal Sandoval    :  1985  MRN: [id number]  Pt Location: [facility] OR ROOM 04    SURGERY DATE: 2022    Surgeon(s) and Role:     * Mason Alvarez MD - Primary    Preop Diagnosis:  Urge incontinence [N39 41]    Post-Op Diagnosis Codes:     * Urge incontinence [N39 41]    Procedure(s) (LRB):  CYSTOSCOPY, INJECTION BOTULINUM TOXIN (BOTOX) (N/A)    Specimen(s):  * No specimens in log *    Estimated Blood Loss:   Minimal    Drains:  * No LDAs found *    Anesthesia Type:   Choice    Operative Indications:  Urge incontinence [N39 41]      Operative Findings:  Mild trabeculations, minimal PVR at the start    Complications:   None    Procedure and Technique:    After the patient was placed under adequate general anesthesia, she was prepped and draped using chlorhexidine solution in lithotomy position  The 25 Thai scope was passed without difficulty in the urethra which had no strictures  The bladder was flushed out to remove any residual contaminated urine  The Botox was then injected  200 units Botox was in mixed in 30 mL sterile saline  I injected 1 mL in 30 different locations on the posterior wall  This was in a grid 6 x 5  Minimal bleeding was encountered  Care was taken to not inject around the orificees  After thorough injection of Botox, scope was removed, Jonas catheter inserted, patient taken to recovery in good condition         I was present for the entire procedure    Patient Disposition:  PACU       SIGNATURE: Mason Alvarez MD  DATE: 2022  TIME: 2:09 PM

## 2022-02-07 NOTE — INTERVAL H&P NOTE
H&P reviewed  After examining the patient I find no changes in the patients condition since the H&P had been written      Vitals:    02/07/22 1145   BP: 127/77   Pulse: 77   Resp: 18   Temp: 98 1 °F (36 7 °C)   SpO2: 95%

## 2022-02-07 NOTE — DISCHARGE SUMMARY
Discharge Summary - Nila Llamas 31 y o  female MRN: [id number]    Admission Date: 2/7/2022     Admitting Diagnosis: Urge incontinence [N39 41]    Procedures Performed: cysto, botox    Patient underwent planned outpt surgery and recovered without complication  Discharged in good condition  Medications were prescribed  Pt knows to have office follow-up at the appropriate time

## 2022-02-07 NOTE — DISCHARGE SUMMARY
Discharge Summary - Vikas Edwards 31 y o  female MRN: [id number]    Admission Date: 2/7/2022     Admitting Diagnosis: Urge incontinence [N39 41]    Procedures Performed: cysto, botox    Patient underwent planned outpt surgery and recovered without complication  Discharged in good condition  Medications were prescribed  Pt knows to have office follow-up at the appropriate time

## 2022-02-07 NOTE — DISCHARGE INSTRUCTIONS
ALL YOUR  PREVIOUS MEDS ARE THE SAME  NEW MEDS:cefuroxime antibiotic    EXPECT SOME BLOOD IN URINE, BURNING, FREQUENT URINATION  ACTIVITY:  RESUME FULL ACTIVITY tomorrow

## 2022-02-07 NOTE — ANESTHESIA POSTPROCEDURE EVALUATION
Post-Op Assessment Note    CV Status:  Stable  Pain Score: 0    Pain management: adequate     Mental Status:  Alert and awake   Hydration Status:  Euvolemic   PONV Controlled:  Controlled   Airway Patency:  Patent      Post Op Vitals Reviewed: Yes      Staff: CRNA         No complications documented      /63 (02/07/22 1410)    Temp 97 6 °F (36 4 °C) (02/07/22 1410)    Pulse 96 (02/07/22 1410)   Resp 20 (02/07/22 1410)    SpO2 95 % (02/07/22 1410)

## 2022-02-08 ENCOUNTER — TELEPHONE (OUTPATIENT)
Dept: UROLOGY | Facility: HOSPITAL | Age: 37
End: 2022-02-08

## 2022-02-08 NOTE — TELEPHONE ENCOUNTER
Post Op Note    Stef Adan is a 31 y o  female s/p Cysto/botox performed 02/07/2022  Stef Adan is a patient of Dr Gracia Evans and is seen at the [facility] office  How would you rate your pain on a scale from 1 to 10, 10 being the worst pain ever? 0  Have you had a fever? No    Have your bowel movements been regular? No  Suggested fruit, hydraton and colace  Do you have any difficulty urinating? No  Do you have any other questions or concerns that I can address at this time? Patient requesting f/u time be later in day   Scheduled for 3/28 @ 1;30

## 2022-03-28 ENCOUNTER — OFFICE VISIT (OUTPATIENT)
Dept: UROLOGY | Facility: HOSPITAL | Age: 37
End: 2022-03-28
Payer: COMMERCIAL

## 2022-03-28 VITALS
HEIGHT: 64 IN | OXYGEN SATURATION: 98 % | SYSTOLIC BLOOD PRESSURE: 118 MMHG | HEART RATE: 63 BPM | BODY MASS INDEX: 26.12 KG/M2 | DIASTOLIC BLOOD PRESSURE: 78 MMHG | WEIGHT: 153 LBS

## 2022-03-28 DIAGNOSIS — R39.15 URINARY URGENCY: Primary | ICD-10-CM

## 2022-03-28 LAB
BACTERIA UR QL AUTO: ABNORMAL /HPF
BILIRUB UR QL STRIP: NEGATIVE
CLARITY UR: CLEAR
COLOR UR: ABNORMAL
GLUCOSE UR STRIP-MCNC: NEGATIVE MG/DL
HGB UR QL STRIP.AUTO: NEGATIVE
KETONES UR STRIP-MCNC: NEGATIVE MG/DL
LEUKOCYTE ESTERASE UR QL STRIP: NEGATIVE
NITRITE UR QL STRIP: NEGATIVE
NON-SQ EPI CELLS URNS QL MICRO: ABNORMAL /HPF
PH UR STRIP.AUTO: 6 [PH]
PROT UR STRIP-MCNC: NEGATIVE MG/DL
RBC #/AREA URNS AUTO: ABNORMAL /HPF
SP GR UR STRIP.AUTO: 1.02 (ref 1–1.03)
UROBILINOGEN UR STRIP-ACNC: <2 MG/DL
WBC #/AREA URNS AUTO: ABNORMAL /HPF

## 2022-03-28 PROCEDURE — 99213 OFFICE O/P EST LOW 20 MIN: CPT | Performed by: NURSE PRACTITIONER

## 2022-03-28 PROCEDURE — 81001 URINALYSIS AUTO W/SCOPE: CPT | Performed by: NURSE PRACTITIONER

## 2022-03-28 RX ORDER — OXYBUTYNIN CHLORIDE 5 MG/1
5 TABLET ORAL DAILY
Qty: 30 TABLET | Refills: 3 | Status: SHIPPED | OUTPATIENT
Start: 2022-03-28 | End: 2022-04-05 | Stop reason: ALTCHOICE

## 2022-03-28 NOTE — PROGRESS NOTES
03/28/22    Vikas Edwards   1985   [id number]     Assessment  1 Urgency of urination s/p Bladder Botox (2/7/22)     Discussion/Plan  1 Urgency of urination s/p Bladder Botox (2/7/22)    Trial Oxybutynin 5 mg daily    Discussed risk of urinary retention, side effects reviewed    Avoid bladder irritants, avoid constipation, double void   Urine sent for microscopic UA      Await urine studies  Follow up 3-6 months for PVR or sooner if needed  Subjective  HPI   Frances Go is a 31year old female who presents in follow up s/p bladder Botox procedure 2/7/22 with Dr Casey Freeman  She was having significant urgency of urination resulting in leakage  She is having urgency despite treatment however she reports it has lessened  She is inquiring about oral medication  She has tried Myrbetriq and Oxybutynin in the past as well as PTNS treatments  This was her 3rd Botox treatment done under anesthesia  She feels that she is able to empty without difficulty  She is hydrating with water  She denies constipation  She has history of spinal cord injury at L1  She ambulates with a cane  Review of Systems - History obtained from chart review and the patient  General ROS: negative  Psychological ROS: negative  Endocrine ROS: negative  Breast ROS: negative  Respiratory ROS: negative  Cardiovascular ROS: negative  Gastrointestinal ROS: negative  Genito-Urinary ROS: positive for - urgency   Musculoskeletal ROS: negative  Neurological ROS: no TIA or stroke symptoms  Dermatological ROS: negative       Objective  Physical Exam  Vitals and nursing note reviewed  Constitutional:       General: She is not in acute distress  Appearance: Normal appearance  She is normal weight  She is not ill-appearing, toxic-appearing or diaphoretic  HENT:      Head: Normocephalic and atraumatic  Pulmonary:      Effort: Pulmonary effort is normal  No respiratory distress  Musculoskeletal:         General: Normal range of motion  Cervical back: Normal range of motion  Skin:     General: Skin is warm and dry  Neurological:      General: No focal deficit present  Mental Status: She is alert and oriented to person, place, and time  Mental status is at baseline  Gait: Gait abnormal    Psychiatric:         Mood and Affect: Mood normal          Behavior: Behavior normal          Thought Content: Thought content normal          Judgment: Judgment normal            EUSEBIA Campa     I have spent 15 minutes with Patient  today in which greater than 50% of this time was spent in counseling/coordination of care regarding Risks and benefits of tx options, Intructions for management, Patient and family education, Importance of tx compliance, Risk factor reductions and Impressions

## 2022-04-05 ENCOUNTER — TELEPHONE (OUTPATIENT)
Dept: UROLOGY | Facility: AMBULATORY SURGERY CENTER | Age: 37
End: 2022-04-05

## 2022-04-05 DIAGNOSIS — R39.15 URINARY URGENCY: Primary | ICD-10-CM

## 2022-04-05 DIAGNOSIS — N32.81 OAB (OVERACTIVE BLADDER): Primary | ICD-10-CM

## 2022-04-05 NOTE — TELEPHONE ENCOUNTER
Regarding: FW: bladder medication    ----- Message -----  From: Carmelo Sauer  Sent: 4/5/2022   2:01 PM EDT  To: [facility]  Subject: bladder medication                               I was on 50mg  Thanks!

## 2022-04-07 ENCOUNTER — LAB (OUTPATIENT)
Dept: LAB | Facility: HOSPITAL | Age: 37
End: 2022-04-07
Payer: COMMERCIAL

## 2022-04-07 ENCOUNTER — OFFICE VISIT (OUTPATIENT)
Dept: FAMILY MEDICINE CLINIC | Facility: HOSPITAL | Age: 37
End: 2022-04-07
Payer: COMMERCIAL

## 2022-04-07 VITALS
SYSTOLIC BLOOD PRESSURE: 130 MMHG | TEMPERATURE: 96.7 F | WEIGHT: 154.2 LBS | BODY MASS INDEX: 26.32 KG/M2 | HEIGHT: 64 IN | HEART RATE: 79 BPM | DIASTOLIC BLOOD PRESSURE: 84 MMHG

## 2022-04-07 DIAGNOSIS — Z01.419 ENCNTR FOR GYN EXAM (GENERAL) (ROUTINE) W/O ABN FINDINGS: Primary | ICD-10-CM

## 2022-04-07 DIAGNOSIS — F31.81 BIPOLAR II DISORDER (HCC): ICD-10-CM

## 2022-04-07 DIAGNOSIS — Z12.4 ENCOUNTER FOR SCREENING FOR CERVICAL CANCER: ICD-10-CM

## 2022-04-07 DIAGNOSIS — F09 CHRONIC NON-PSYCHOTIC BRAIN SYNDROME: ICD-10-CM

## 2022-04-07 DIAGNOSIS — F43.10 POSTTRAUMATIC STRESS DISORDER: ICD-10-CM

## 2022-04-07 LAB
ALBUMIN SERPL BCP-MCNC: 3.8 G/DL (ref 3.5–5)
ALP SERPL-CCNC: 64 U/L (ref 46–116)
ALT SERPL W P-5'-P-CCNC: 22 U/L (ref 12–78)
ANION GAP SERPL CALCULATED.3IONS-SCNC: 3 MMOL/L (ref 4–13)
AST SERPL W P-5'-P-CCNC: 15 U/L (ref 5–45)
BASOPHILS # BLD AUTO: 0.06 THOUSANDS/ΜL (ref 0–0.1)
BASOPHILS NFR BLD AUTO: 1 % (ref 0–1)
BILIRUB SERPL-MCNC: 0.43 MG/DL (ref 0.2–1)
BUN SERPL-MCNC: 12 MG/DL (ref 5–25)
CALCIUM SERPL-MCNC: 9.1 MG/DL (ref 8.3–10.1)
CHLORIDE SERPL-SCNC: 109 MMOL/L (ref 100–108)
CHOLEST SERPL-MCNC: 181 MG/DL
CO2 SERPL-SCNC: 26 MMOL/L (ref 21–32)
CREAT SERPL-MCNC: 0.63 MG/DL (ref 0.6–1.3)
EOSINOPHIL # BLD AUTO: 0.16 THOUSAND/ΜL (ref 0–0.61)
EOSINOPHIL NFR BLD AUTO: 3 % (ref 0–6)
ERYTHROCYTE [DISTWIDTH] IN BLOOD BY AUTOMATED COUNT: 12.5 % (ref 11.6–15.1)
GFR SERPL CREATININE-BSD FRML MDRD: 117 ML/MIN/1.73SQ M
GLUCOSE P FAST SERPL-MCNC: 85 MG/DL (ref 65–99)
HCT VFR BLD AUTO: 43.2 % (ref 34.8–46.1)
HDLC SERPL-MCNC: 68 MG/DL
HGB BLD-MCNC: 14.7 G/DL (ref 11.5–15.4)
IMM GRANULOCYTES # BLD AUTO: 0.03 THOUSAND/UL (ref 0–0.2)
IMM GRANULOCYTES NFR BLD AUTO: 1 % (ref 0–2)
LDLC SERPL CALC-MCNC: 89 MG/DL (ref 0–100)
LYMPHOCYTES # BLD AUTO: 1.68 THOUSANDS/ΜL (ref 0.6–4.47)
LYMPHOCYTES NFR BLD AUTO: 27 % (ref 14–44)
MCH RBC QN AUTO: 31.6 PG (ref 26.8–34.3)
MCHC RBC AUTO-ENTMCNC: 34 G/DL (ref 31.4–37.4)
MCV RBC AUTO: 93 FL (ref 82–98)
MONOCYTES # BLD AUTO: 0.48 THOUSAND/ΜL (ref 0.17–1.22)
MONOCYTES NFR BLD AUTO: 8 % (ref 4–12)
NEUTROPHILS # BLD AUTO: 3.84 THOUSANDS/ΜL (ref 1.85–7.62)
NEUTS SEG NFR BLD AUTO: 60 % (ref 43–75)
NONHDLC SERPL-MCNC: 113 MG/DL
NRBC BLD AUTO-RTO: 0 /100 WBCS
PLATELET # BLD AUTO: 221 THOUSANDS/UL (ref 149–390)
PMV BLD AUTO: 9.9 FL (ref 8.9–12.7)
POTASSIUM SERPL-SCNC: 3.8 MMOL/L (ref 3.5–5.3)
PROT SERPL-MCNC: 7.2 G/DL (ref 6.4–8.2)
RBC # BLD AUTO: 4.65 MILLION/UL (ref 3.81–5.12)
SODIUM SERPL-SCNC: 138 MMOL/L (ref 136–145)
T3FREE SERPL-MCNC: 2.77 PG/ML (ref 2.3–4.2)
T4 FREE SERPL-MCNC: 0.77 NG/DL (ref 0.76–1.46)
TRIGL SERPL-MCNC: 118 MG/DL
TSH SERPL DL<=0.05 MIU/L-ACNC: 2.21 UIU/ML (ref 0.45–4.5)
WBC # BLD AUTO: 6.25 THOUSAND/UL (ref 4.31–10.16)

## 2022-04-07 PROCEDURE — 84439 ASSAY OF FREE THYROXINE: CPT

## 2022-04-07 PROCEDURE — 80053 COMPREHEN METABOLIC PANEL: CPT

## 2022-04-07 PROCEDURE — 36415 COLL VENOUS BLD VENIPUNCTURE: CPT

## 2022-04-07 PROCEDURE — 85025 COMPLETE CBC W/AUTO DIFF WBC: CPT

## 2022-04-07 PROCEDURE — 84443 ASSAY THYROID STIM HORMONE: CPT

## 2022-04-07 PROCEDURE — 84481 FREE ASSAY (FT-3): CPT

## 2022-04-07 PROCEDURE — G0145 SCR C/V CYTO,THINLAYER,RESCR: HCPCS | Performed by: NURSE PRACTITIONER

## 2022-04-07 PROCEDURE — 99395 PREV VISIT EST AGE 18-39: CPT | Performed by: NURSE PRACTITIONER

## 2022-04-07 PROCEDURE — 80061 LIPID PANEL: CPT

## 2022-04-07 NOTE — PROGRESS NOTES
Assessment     Healthy female exam       Plan     Education reviewed: safe sex/STD prevention, self breast exams and weight bearing exercise  Pap done today given h/o abnormal pap with report of HPV  Will get records from previous GYN  Best if routine GYN care be done by GYN given IUD  Pt will be referred to GYN within 1 [facility] for next year  Subjective     Jensen Arana is a 31 y o  female who presents for routine gyn exam  Periods are rare, lasting 1 days  Dysmenorrhea:none  Cyclic symptoms include none  No intermenstrual bleeding, spotting, or discharge  Current contraception: IUD  History of abnormal Pap smear: yes - colposcopy 2020 Reports she was HPV positive  Family history of uterine or ovarian cancer: no  History of abnormal mammogram: N/A  Family history of breast cancer: no  Menstrual History:  OB History    No obstetric history on file  Obstetric Comments   Onset of menses 17 yrs old              Menarche age: 17  No LMP recorded  Patient has had an implant  Pt reports occasional spotting that lasts about 1 day  Has neurogenic bladder  Follows with urology  The following portions of the patient's history were reviewed and updated as appropriate: allergies, current medications, past family history, past medical history, past social history, past surgical history and problem list    Jensen Arana is a 31 y o  female here for a routine exam        Gynecologic History  No LMP recorded  Patient has had an implant  Contraception: IUD  Last Pap: 2020  Results were: abnormal Had colposcopy  Reports this was negative  Last mammogram: N/A  IUD placed 9/2021-Mirena    Obstetric History  OB History   Obstetric Comments   Onset of menses 17 yrs old       Review of Systems      Review of Systems   Constitutional: Negative for chills, fatigue and fever  Respiratory: Negative for shortness of breath  Cardiovascular: Negative for chest pain, palpitations and leg swelling  Gastrointestinal: Negative for abdominal pain, constipation, diarrhea, nausea and vomiting  Genitourinary: Negative  Social History     Tobacco Use    Smoking status: Former Smoker     Packs/day: 0 70     Years: 1 00     Pack years: 0 70     Types: Cigarettes     Quit date: 2016     Years since quittin 3    Smokeless tobacco: Never Used   Vaping Use    Vaping Use: Never used   Substance Use Topics    Alcohol use: Yes     Comment: liquor    Drug use: Not Currently     Types: Marijuana     Family History   Problem Relation Age of Onset    Diabetes Father     Lymphoma Father         Waldenstrom's    Heart disease Father         cardiac disorder    Hypertension Father     Pulmonary embolism Father     Hyperlipidemia Father     Cancer Father     Heart attack Father     Clotting disorder Father     Stroke Family     Depression Mother     No Known Problems Sister     No Known Problems Brother     Stroke Maternal Grandmother     Heart disease Paternal Grandfather      Past Surgical History:   Procedure Laterality Date    BOTOX INJECTION N/A 2019    Procedure: INJECTION BOTULINUM TOXIN (BOTOX); Surgeon: Katrina Mijares MD;  Location[facility];  Service: Urology    BOTOX INJECTION N/A 2021    Procedure: INJECTION BOTULINUM TOXIN (BOTOX), cystoscopy;  Surgeon: Eros Griffin MD;  Location[facility];  Service: Urology    BOTOX INJECTION N/A 2022    Procedure: CYSTOSCOPY, INJECTION BOTULINUM TOXIN (BOTOX); Surgeon: Eros Griffin MD;  [facility];  Service: Urology    COLONOSCOPY      CYSTOSCOPY      Diagnostic; onset: 1/3/17    FL MYELOGRAM 2 OR MORE REGIONS  2018    [place] COLONOSCOPY FLX DX W/COLLJ SPEC WHEN PFRMD N/A 2017    Procedure: COLONOSCOPY;  Surgeon: Bebeto Mckeon MD;  [facility];   Service: Gastroenterology    W[place] CYSTOURETHROSCOPY N/A 2019    Procedure: CYSTOSCOPY;  Surgeon: Katrina Mijares MD;  [facility];  Service: Urology    SPINAL FUSION  2013    T12-L2     Current Outpatient Medications   Medication Sig Dispense Refill    busPIRone (BUSPAR) 10 mg tablet Take 1 tablet by mouth 3 (three) times a day      cyclobenzaprine (FLEXERIL) 5 mg tablet Take 1 tablet (5 mg total) by mouth 3 (three) times a day as needed for muscle spasms 30 tablet 0    gabapentin (NEURONTIN) 300 mg capsule Take 1 capsule by mouth 3 (three) times a day      hydrOXYzine HCL (ATARAX) 10 mg tablet As needed for anxiety       levonorgestrel (MIRENA) 20 MCG/24HR IUD 20 mcg/day      Mirabegron ER 50 MG TB24 Take 1 tablet (50 mg total) by mouth in the morning 30 tablet 11    sertraline (ZOLOFT) 100 mg tablet Take 100 mg by mouth every morning  0     Current Facility-Administered Medications   Medication Dose Route Frequency Provider Last Rate Last Admin    Botulinum Toxin Type A SOLR 200 Units  200 Units Injection Once Liz Saleh MD        Botulinum Toxin Type A SOLR 200 Units  200 Units Injection Once Liz Saleh MD             Objective:    Vitals:    04/07/22 0928   BP: 130/84   Pulse: 79   Temp: (!) 96 7 °F (35 9 °C)       Physical Exam  Constitutional:       Appearance: She is well-developed  Genitourinary:      Bladder and urethral meatus normal       Right Labia: No rash, tenderness, lesions, skin changes or Bartholin's cyst      Left Labia: No tenderness, lesions, skin changes, Bartholin's cyst or rash  No vaginal discharge  Right Adnexa: not tender, not full and no mass present  Left Adnexa: not tender, not full and no mass present  No cervical motion tenderness, discharge, friability or polyp  IUD strings visualized  Uterus is not enlarged or tender  No uterine mass detected  Breasts:      Breasts are soft  Right: No swelling, bleeding, inverted nipple, mass, nipple discharge, skin change, tenderness or axillary adenopathy        Left: No swelling, bleeding, inverted nipple, mass, nipple discharge, skin change, tenderness or axillary adenopathy  Neck:      Thyroid: No thyromegaly  Cardiovascular:      Rate and Rhythm: Normal rate and regular rhythm  Heart sounds: Normal heart sounds  No murmur heard  Pulmonary:      Effort: Pulmonary effort is normal       Breath sounds: Normal breath sounds  Abdominal:      General: Abdomen is flat  Bowel sounds are normal       Palpations: Abdomen is soft  There is no hepatomegaly or splenomegaly  Tenderness: There is no abdominal tenderness  Lymphadenopathy:      Upper Body:      Right upper body: No axillary adenopathy  Left upper body: No axillary adenopathy  Neurological:      Mental Status: She is alert and oriented to person, place, and time  Skin:     General: Skin is warm and dry  Psychiatric:         Mood and Affect: Mood normal          Behavior: Behavior normal          Thought Content: Thought content normal          Judgment: Judgment normal    Vitals reviewed

## 2022-04-15 LAB
LAB AP GYN PRIMARY INTERPRETATION: NORMAL
Lab: NORMAL

## 2022-06-09 ENCOUNTER — OFFICE VISIT (OUTPATIENT)
Dept: FAMILY MEDICINE CLINIC | Facility: HOSPITAL | Age: 37
End: 2022-06-09
Payer: COMMERCIAL

## 2022-06-09 VITALS
HEART RATE: 82 BPM | BODY MASS INDEX: 26.77 KG/M2 | HEIGHT: 64 IN | SYSTOLIC BLOOD PRESSURE: 124 MMHG | TEMPERATURE: 98.1 F | WEIGHT: 156.8 LBS | DIASTOLIC BLOOD PRESSURE: 82 MMHG

## 2022-06-09 DIAGNOSIS — M54.2 CERVICALGIA: Primary | ICD-10-CM

## 2022-06-09 DIAGNOSIS — K59.09 OTHER CONSTIPATION: ICD-10-CM

## 2022-06-09 DIAGNOSIS — Z87.828 H/O SPINAL CORD INJURY: ICD-10-CM

## 2022-06-09 PROCEDURE — 99214 OFFICE O/P EST MOD 30 MIN: CPT | Performed by: INTERNAL MEDICINE

## 2022-06-09 NOTE — ASSESSMENT & PLAN NOTE
Chronic and related to spinal cord injury, has to manually disimpact with BM, start MiraLax daily, if no better after a week or so add Colace 100 mg and titrate up to bid as needed, number for GI given upon request, urged to con't high fiber diet and plenty of water during day, encouraged to keep active

## 2022-06-09 NOTE — PROGRESS NOTES
Assessment/Plan:    Constipation  Chronic and related to spinal cord injury, has to manually disimpact with BM, start MiraLax daily, if no better after a week or so add Colace 100 mg and titrate up to bid as needed, number for GI given upon request, urged to con't high fiber diet and plenty of water during day, encouraged to keep active    H/O spinal cord injury  Likely cause of her constipation, needs new straps for orthotics - written rx given       Diagnoses and all orders for this visit:    Cervicalgia  Comments:  Reassured pt nml exam and no red flag radicular symptoms, con't FLexeril prn and heat/massage and stretches, would benefit from PT - pt agreeable - order given, call with new/worse symptoms or if no better after PT  Orders:  -     Ambulatory Referral to Physical Therapy; Future    Other constipation  -     Ambulatory Referral to Gastroenterology; Future    H/O spinal cord injury      PAP 4/22    BW 4/22        Subjective:      Patient ID: Trung Houser is a 31 y o  female  HPI Pt here for an acute visit    She  Notes a few weeks of neck pain  She had no specific fall/injury/trauma/new activity  She has been doing a lot of activity looking down and feels neck pain and like the muscles of neck and around the skull are tight  The pain does radiate into the L shoulder intermittently - she is L handed  She has been using a heating pad and stretches with benefit  She has Flexeril which she is using as needed with benefit  She is on Gabapentin tid as well - for mood stabilizer  She notes some issues with constipation  She has had constipation for 6 yrs since her spinal cord injury  She notes stools are hard and infrequent  She notes a known h/o hemorrhoids  She notes intermittent rectal bleeding when she strains a lot  She has to disimpact herself with BM as well    She has seen GI for this in the past and she was given medication but she did not like it as it then caused diarrhea which she found more bothersome  She has a lot of high fiber foods/fruits and veggies and drinks a lot of water  She tries to keep active  She is currently only using a fiber supplement like Metamucil  DBP a bit elevated today  She notes no frequent HA's/dizziness/double vision/CP  She does not check her BP at home at all  Needs new straps for orthotics        Review of Systems   Constitutional: Negative for chills, fever and unexpected weight change  Eyes: Negative for pain and visual disturbance  Respiratory: Negative for cough, shortness of breath and wheezing  Cardiovascular: Negative for chest pain and palpitations  Gastrointestinal: Positive for blood in stool and constipation  Negative for abdominal pain, diarrhea, nausea and vomiting  Genitourinary: Negative for difficulty urinating and dysuria  Musculoskeletal: Positive for gait problem and neck pain  Skin: Negative for rash and wound  Neurological: Positive for weakness  Negative for numbness  Psychiatric/Behavioral: Positive for dysphoric mood  Having some mood issues - following with psych and therapist who adjust her mood meds as needed         Objective:    /82   Pulse 82   Temp 98 1 °F (36 7 °C) (Tympanic)   Ht 5' 4" (1 626 m)   Wt 71 1 kg (156 lb 12 8 oz)   BMI 26 91 kg/m²      Physical Exam  Vitals and nursing note reviewed  Constitutional:       General: She is not in acute distress  Appearance: She is well-developed  She is not ill-appearing  HENT:      Head: Normocephalic and atraumatic  Eyes:      General:         Right eye: No discharge  Left eye: No discharge  Conjunctiva/sclera: Conjunctivae normal    Neck:      Trachea: No tracheal deviation  Cardiovascular:      Rate and Rhythm: Normal rate and regular rhythm  Heart sounds: Normal heart sounds  No murmur heard  No friction rub  Pulmonary:      Effort: Pulmonary effort is normal  No respiratory distress        Breath sounds: Normal breath sounds  No wheezing, rhonchi or rales  Abdominal:      General: There is no distension  Palpations: Abdomen is soft  Tenderness: There is no abdominal tenderness  There is no guarding or rebound  Musculoskeletal:      Cervical back: Neck supple  Comments: Cervical spine: no pain with palp of spinous processes of cervical spine, 5/5 B/L UE muscle strength   Skin:     General: Skin is warm  Coloration: Skin is not pale  Findings: No rash  Neurological:      Mental Status: She is alert  Sensory: No sensory deficit  Motor: No weakness or abnormal muscle tone  Gait: Gait abnormal       Deep Tendon Reflexes: Reflexes normal       Comments: 5/5 B/L UE muscle strength, 2/4 bicep reflexes B/L, sensation intact to light touch B/L UE   Psychiatric:         Mood and Affect: Mood normal          Behavior: Behavior normal          Thought Content:  Thought content normal          Judgment: Judgment normal

## 2022-06-22 ENCOUNTER — EVALUATION (OUTPATIENT)
Dept: PHYSICAL THERAPY | Facility: CLINIC | Age: 37
End: 2022-06-22
Payer: COMMERCIAL

## 2022-06-22 DIAGNOSIS — M54.2 NECK PAIN: Primary | ICD-10-CM

## 2022-06-22 DIAGNOSIS — M54.2 CERVICALGIA: ICD-10-CM

## 2022-06-22 PROCEDURE — 97161 PT EVAL LOW COMPLEX 20 MIN: CPT | Performed by: PHYSICAL THERAPIST

## 2022-06-22 PROCEDURE — 97140 MANUAL THERAPY 1/> REGIONS: CPT | Performed by: PHYSICAL THERAPIST

## 2022-06-22 NOTE — PROGRESS NOTES
PT Evaluation     Today's date: 2022  Patient name: Keon Hernandez  : 1985  MRN: [id number]  Referring provider: Rani Moser DO  Dx:   Encounter Diagnosis     ICD-10-CM    1  Neck pain  M54 2                   Assessment  Assessment details: Keon Hernandez is a 31 y o  female presenting to outpatient physical therapy at [place] [address] with complaints of neck pain   They present with decreased range of motion, decreased strength, limited flexibility, poor postural awareness, poor body mechanics, poor balance, decreased tolerance to activity and decreased functional mobility due to Neck pain  (primary encounter diagnosis)  Lisandro Mott would benefit from skilled physical therapy to address noted impairments in order to allow for full functional return to work and ADL-related activities  Thank you for the referral!  Impairments: abnormal muscle firing, abnormal or restricted ROM, activity intolerance, impaired balance, impaired physical strength, lacks appropriate home exercise program, pain with function and poor body mechanics  Barriers to therapy: n/a  Understanding of Dx/Px/POC: excellent  Goals  ST   Independent with HEP in 2 weeks  2  Decrease pain by 50% in 3 wks  3   Increase R rotation ROM to [facility] in 3 weeks     LT  Achieve FOTO score of 62/100 in 6 weeks   2   Able to report 75% decreased pain while working in 6 weeks      Plan  Plan details: RE in 6 weeks    Patient would benefit from: skilled physical therapy  Planned modality interventions: cryotherapy, electrical stimulation/Russian stimulation and thermotherapy: hydrocollator packs  Planned therapy interventions: abdominal trunk stabilization, manual therapy, neuromuscular re-education, therapeutic activities, therapeutic exercise, body mechanics training and home exercise program  Frequency: 1x week  Duration in weeks: 6  Plan of Care beginning date: 2022  Plan of Care expiration date: 2022  Treatment plan discussed with: patient        Subjective Evaluation    History of Present Illness  Mechanism of injury: Pt c/o neck pain 2 months ago  No injury  She does a lot of work where her neck is flexed while reading, doing puzzles, doing work, on the computer and thinks this has put an excessive strain on the muscles in the back of the neck  Gradual improvement over the past few weeks, she's been trying to work at eye level, has gotten an ergonomic chair for improved neck posture  Pain is located L sided suboccipital and UT region  Avg rated 4/10  Agg with looking up and down, poor posture  Eased with massage, heat, rest, good posture  Denies dizziness, difficulty swallowing, vision disturbances  Positive for LUE N/T and radiating pain, headaches (2x/wk, SO to temples, rated 5/10, lasts for 1 hr and relieves with lying down)  She does contract work doing interior design, all online  She is working for 4-5 hrs/day on the computer  Had to take a week off about a month ago due to the pain impacting her ability to be on the computer      Patient Goals  Patient goals for therapy: decreased edema, decreased pain, improved balance, increased motion, increased strength, independence with ADLs/IADLs, return to sport/leisure activities and return to work          Objective     Postural Observations    Additional Postural Observation Details  Mild rounded shoulders    Tenderness     Additional Tenderness Details  TTP L UT, LS, SO, bilat masseter, temples, pecs, SCM    Neurological Testing     Sensation   Cervical/Thoracic   Left   Intact: light touch    Right   Intact: light touch    Active Range of Motion   Cervical/Thoracic Spine       Cervical  Subcranial protraction:  WFL   Subcranial retraction:  WFL   Flexion: 45 degrees  WFL  Extension: 45 degrees     with pain  Left lateral flexion: 45 degrees     Restriction level: minimal  Right lateral flexion: 50 degrees     WFL  Left rotation: 56 degrees WFL  Right rotation: 50 degrees    with pain Restriction level: minimal    Thoracic    Flexion:  WFL  Extension:  WFL  Left lateral flexion:  WFL  Right lateral flexion:  WFL  Left rotation:  WFL  Right rotation:  [facility]    Joint Play   Joints within functional limits: C1, C2, C3, C4, C5, C6, C7, T1, T2 and T3     Strength/Myotome Testing   Cervical Spine   Neck extension: 5  Neck flexion: 5    Left   Normal strength    Right   Normal strength             Precautions: Other factors: h/o spinal cord injury  Pt goals:  EPOC: 8/5/22      HEP:  Access Code: [facility]  URL: [email or web address]  Date: 06/22/2022  Prepared by: Chloe Badillo    Exercises  · Seated TMJ Opening with Unilateral Facilitation with Finger  · Seated Upper Trapezius Stretch - 3 sets - 20 sec hold  · Seated Levator Scapulae Stretch - 3 sets - 20 sec hold  · Doorway Pec Stretch at 90 Degrees Abduction - 20 sec hold  · Backward shoulder rolls - 10 reps            Manuals 6/22            TMJ active release JOSE            Cervical STM JOSE            UT stretch, active release JOSE            pec release JOSE                                                                Neuro Re-Ed             Chin tuck             DNF             Prone squeeze             Prone ATY                                                    Ther Ex             UBE             Doorway pec             UT, LS stretch             SO stretch +rot             ER pull apart             row                                       Ther Activity                                       Gait Training                                       Modalities

## 2022-06-28 ENCOUNTER — APPOINTMENT (OUTPATIENT)
Dept: PHYSICAL THERAPY | Facility: CLINIC | Age: 37
End: 2022-06-28
Payer: COMMERCIAL

## 2022-06-30 ENCOUNTER — APPOINTMENT (OUTPATIENT)
Dept: LAB | Facility: HOSPITAL | Age: 37
End: 2022-06-30
Payer: COMMERCIAL

## 2022-06-30 ENCOUNTER — OFFICE VISIT (OUTPATIENT)
Dept: FAMILY MEDICINE CLINIC | Facility: HOSPITAL | Age: 37
End: 2022-06-30
Payer: COMMERCIAL

## 2022-06-30 VITALS
WEIGHT: 155.2 LBS | HEIGHT: 64 IN | BODY MASS INDEX: 26.5 KG/M2 | TEMPERATURE: 99.6 F | DIASTOLIC BLOOD PRESSURE: 70 MMHG | SYSTOLIC BLOOD PRESSURE: 120 MMHG | HEART RATE: 78 BPM | OXYGEN SATURATION: 98 %

## 2022-06-30 DIAGNOSIS — Z72.51 HIGH RISK HETEROSEXUAL BEHAVIOR: ICD-10-CM

## 2022-06-30 DIAGNOSIS — Z11.3 SCREENING FOR STD (SEXUALLY TRANSMITTED DISEASE): Primary | ICD-10-CM

## 2022-06-30 DIAGNOSIS — Z11.3 SCREENING FOR STD (SEXUALLY TRANSMITTED DISEASE): ICD-10-CM

## 2022-06-30 PROCEDURE — 87591 N.GONORRHOEAE DNA AMP PROB: CPT

## 2022-06-30 PROCEDURE — 36415 COLL VENOUS BLD VENIPUNCTURE: CPT

## 2022-06-30 PROCEDURE — 99213 OFFICE O/P EST LOW 20 MIN: CPT | Performed by: NURSE PRACTITIONER

## 2022-06-30 PROCEDURE — 86803 HEPATITIS C AB TEST: CPT

## 2022-06-30 PROCEDURE — 87491 CHLMYD TRACH DNA AMP PROBE: CPT

## 2022-06-30 PROCEDURE — 87389 HIV-1 AG W/HIV-1&-2 AB AG IA: CPT

## 2022-06-30 PROCEDURE — 86592 SYPHILIS TEST NON-TREP QUAL: CPT

## 2022-06-30 RX ORDER — DEXTROAMPHETAMINE SACCHARATE, AMPHETAMINE ASPARTATE MONOHYDRATE, DEXTROAMPHETAMINE SULFATE AND AMPHETAMINE SULFATE 2.5; 2.5; 2.5; 2.5 MG/1; MG/1; MG/1; MG/1
CAPSULE, EXTENDED RELEASE ORAL
COMMUNITY
Start: 2022-06-13

## 2022-06-30 NOTE — PROGRESS NOTES
Assessment/Plan:      Diagnoses and all orders for this visit:    Screening for STD (sexually transmitted disease)  -     Hepatitis C antibody; Future  -     HIV 1/2 Antigen/Antibody (4th Generation) w Reflex [facility]; Future  -     Chlamydia/GC amplified DNA by PCR; Future  -     RPR; Future    High risk heterosexual behavior  -     Hepatitis C antibody; Future  -     HIV 1/2 Antigen/Antibody (4th Generation) w Reflex [facility]; Future  -     Chlamydia/GC amplified DNA by PCR; Future  -     RPR; Future    Other orders  -     amphetamine-dextroamphetamine (ADDERALL XR) 10 MG 24 hr capsule; TAKE 1 CAPSULE BY MOUTH EVERY MORNING (SWALLOW WHOLE)          Subjective:     Patient ID: Mateus Espinal is a 31 y o  female  Wants STD testing  Has had multiple partners  Looking into monogamous relationship  Not consistent condom use  H/O HPV found on pap  No other STDs  Had unprotected intercourse a few weeks ago  Denies any STD symptoms  Testing done a long time ago and was negative  Female  Problem  The patient's primary symptoms include pelvic pain, vaginal bleeding and vaginal discharge  This is a new problem  The current episode started in the past 7 days  The problem occurs intermittently  The problem has been unchanged  The pain is mild  The problem affects both sides  She is not pregnant  Associated symptoms include back pain, constipation and frequency  Pertinent negatives include no chills or fever  The vaginal discharge was thick and white  The vaginal bleeding is spotting  She has not been passing clots  She has not been passing tissue  The symptoms are aggravated by intercourse and tactile pressure  She is sexually active  No, her partner does not have an STD  She uses condoms and an IUD for contraception  Her menstrual history has been irregular  Review of Systems   Constitutional: Negative for chills and fever  Gastrointestinal: Positive for constipation     Genitourinary: Positive for frequency, pelvic pain and vaginal discharge  Musculoskeletal: Positive for back pain  The following portions of the patient's history were reviewed and updated as appropriate: allergies, current medications, past family history, past medical history, past social history, past surgical history and problem list     Objective:  Vitals:    06/30/22 1602   BP: 120/70   Pulse: 78   Temp: 99 6 °F (37 6 °C)   SpO2: 98%      Physical Exam  Vitals reviewed  Constitutional:       Appearance: Normal appearance  Cardiovascular:      Rate and Rhythm: Normal rate and regular rhythm  Heart sounds: Normal heart sounds  No murmur heard  Pulmonary:      Effort: Pulmonary effort is normal       Breath sounds: Normal breath sounds  Skin:     General: Skin is warm and dry  Neurological:      Mental Status: She is alert and oriented to person, place, and time  Psychiatric:         Mood and Affect: Mood normal          Behavior: Behavior normal          Thought Content:  Thought content normal          Judgment: Judgment normal

## 2022-07-01 LAB
C TRACH DNA SPEC QL NAA+PROBE: NEGATIVE
HCV AB SER QL: NORMAL
HIV 1+2 AB+HIV1 P24 AG SERPL QL IA: NORMAL
N GONORRHOEA DNA SPEC QL NAA+PROBE: NEGATIVE
RPR SER QL: NORMAL

## 2022-07-07 ENCOUNTER — OFFICE VISIT (OUTPATIENT)
Dept: PHYSICAL THERAPY | Facility: CLINIC | Age: 37
End: 2022-07-07
Payer: COMMERCIAL

## 2022-07-07 DIAGNOSIS — M54.2 CERVICALGIA: ICD-10-CM

## 2022-07-07 DIAGNOSIS — M54.2 NECK PAIN: Primary | ICD-10-CM

## 2022-07-07 PROCEDURE — 97112 NEUROMUSCULAR REEDUCATION: CPT | Performed by: PHYSICAL THERAPIST

## 2022-07-07 PROCEDURE — 97110 THERAPEUTIC EXERCISES: CPT | Performed by: PHYSICAL THERAPIST

## 2022-07-07 PROCEDURE — 97140 MANUAL THERAPY 1/> REGIONS: CPT | Performed by: PHYSICAL THERAPIST

## 2022-07-07 NOTE — PROGRESS NOTES
Daily Note     Today's date: 2022  Patient name: Tyrel Collins  : 1985  MRN: [id number]  Referring provider: Jessica Gonzalez DO  Dx:   Encounter Diagnosis     ICD-10-CM    1  Neck pain  M54 2    2  Cervicalgia  M54 2        Start Time: 1005          Subjective: Neck has been feeling better with the stretches, but she mentions hurting her wrist       Objective: See treatment diary below      Assessment: [place], UT and SCM with more tissue extensibility and less pain noted with soft tissue release today  Tolerated treatment well  Patient demonstrated fatigue post treatment and would benefit from continued PT      Plan: Continue per plan of care  Precautions: Other factors: h/o spinal cord injury  Pt goals:  EPOC: 22      HEP:  Access Code: [facility]  URL: [email or web address]  Date: 2022  Prepared by: Austin Dumont    Exercises  · Seated TMJ Opening with Unilateral Facilitation with Finger  · Seated Upper Trapezius Stretch - 3 sets - 20 sec hold  · Seated Levator Scapulae Stretch - 3 sets - 20 sec hold  · Doorway Pec Stretch at 90 Degrees Abduction - 20 sec hold  · Backward shoulder rolls - 10 reps            Manuals            TMJ active release JOSE JOSE           Cervical  [address]           UT stretch, active release JOSE            pec release JOSE                                                                Neuro Re-Ed             Chin tuck             DNF  10"x10           Prone squeeze             Prone T  5"x20                                                  Ther Ex             UBE  3'/3'           Doorway pec  30"x2           UT stretch  30"x2 ea           SO stretch +rot  x10 ea           ER pull apart             row  hig row 45# 3x15                                     Ther Activity                                       Gait Training                                       Modalities

## 2022-07-12 ENCOUNTER — OFFICE VISIT (OUTPATIENT)
Dept: PHYSICAL THERAPY | Facility: CLINIC | Age: 37
End: 2022-07-12
Payer: COMMERCIAL

## 2022-07-12 DIAGNOSIS — M54.2 NECK PAIN: Primary | ICD-10-CM

## 2022-07-12 DIAGNOSIS — M54.2 CERVICALGIA: ICD-10-CM

## 2022-07-12 PROCEDURE — 97140 MANUAL THERAPY 1/> REGIONS: CPT | Performed by: PHYSICAL THERAPIST

## 2022-07-12 PROCEDURE — 97110 THERAPEUTIC EXERCISES: CPT | Performed by: PHYSICAL THERAPIST

## 2022-07-12 NOTE — PROGRESS NOTES
Daily Note     Today's date: 2022  Patient name: Maninder Ortega  : 1985  MRN: [id number]  Referring provider: Zeke Purvis DO  Dx:   Encounter Diagnosis     ICD-10-CM    1  Neck pain  M54 2    2  Cervicalgia  M54 2                   Subjective: Upper back and neck was sore after lv  Thinks it was all the tband pulling TE  Objective: See treatment diary below      Assessment: Session shortened as pt was 25 mins late today and unable to accommodate  Decreased load for high row secondary to soreness felt after lv  Slightly more tender to the upper traps and LS today  Tolerated treatment well  Patient demonstrated fatigue post treatment and would benefit from continued PT      Plan: Continue per plan of care  Precautions: Other factors: h/o spinal cord injury  Pt goals:  EPOC: 22      HEP:  Access Code: [facility]  URL: [email or web address] Appointment Scheduler/  Date: 2022  Prepared by: Israel Romero    Exercises  · Seated TMJ Opening with Unilateral Facilitation with Finger  · Seated Upper Trapezius Stretch - 3 sets - 20 sec hold  · Seated Levator Scapulae Stretch - 3 sets - 20 sec hold  · Doorway Pec Stretch at 90 Degrees Abduction - 20 sec hold  · Backward shoulder rolls - 10 reps            Manuals           TMJ active release JOSE JOSE           Cervical STM [place]          UT stretch, active release JOSE  JOSE          pec release JOSE                                                                Neuro Re-Ed             Chin tuck             DNF  10"x10 10"x10          Prone squeeze             Prone T  5"x20 5"x15                                                 Ther Ex             UBE  3'/3' 3'          Doorway pec  30"x2           UT stretch  30"x2 ea           SO stretch +rot  x10 ea           ER pull apart             row  hig row 45# 3x15 High row 25# 3x15                                    Ther Activity                                       Gait Training Modalities

## 2022-07-19 ENCOUNTER — OFFICE VISIT (OUTPATIENT)
Dept: PHYSICAL THERAPY | Facility: CLINIC | Age: 37
End: 2022-07-19
Payer: COMMERCIAL

## 2022-07-19 DIAGNOSIS — M54.2 CERVICALGIA: ICD-10-CM

## 2022-07-19 DIAGNOSIS — M54.2 NECK PAIN: Primary | ICD-10-CM

## 2022-07-19 PROCEDURE — 97110 THERAPEUTIC EXERCISES: CPT | Performed by: PHYSICAL THERAPIST

## 2022-07-19 PROCEDURE — 97140 MANUAL THERAPY 1/> REGIONS: CPT | Performed by: PHYSICAL THERAPIST

## 2022-07-19 PROCEDURE — 97112 NEUROMUSCULAR REEDUCATION: CPT | Performed by: PHYSICAL THERAPIST

## 2022-07-19 NOTE — PROGRESS NOTES
Daily Note     Today's date: 2022  Patient name: Joey Olszewski  : 1985  MRN: [id number]  Referring provider: Tanner Mancilla DO  Dx:   Encounter Diagnosis     ICD-10-CM    1  Neck pain  M54 2    2  Cervicalgia  M54 2        Start Time: 1005          Subjective: Feeling a little rough  Recently went on vacation and prolonged time spent in the car caused a lot of stiffness  Today is sore in the shoulders and traps  Objective: See treatment diary below      Assessment: More tension R UT vs L today  Favorable to UT release  Added self contract-relax upper trap shrug stretches and instructed to continue at home regularly  Tolerated treatment well  Patient demonstrated fatigue post treatment and would benefit from continued PT      Plan: Continue per plan of care  Precautions: Other factors: h/o spinal cord injury  Pt goals:  EPOC: 22      HEP:  Access Code: [facility]  URL: [email or web address]  Date: 2022  Prepared by: Delonte Huang    Exercises  · Seated TMJ Opening with Unilateral Facilitation with Finger  · Seated Upper Trapezius Stretch - 3 sets - 20 sec hold  · Seated Levator Scapulae Stretch - 3 sets - 20 sec hold  · Doorway Pec Stretch at 90 Degrees Abduction - 20 sec hold  · Backward shoulder rolls - 10 reps            Manuals          TMJ active release JOSE JOSE           Cervical STM [place] JOSE         UT stretch, active release JOSE  JOSE          pec release JOSE                                                                Neuro Re-Ed             Chin tuck             DNF  10"x10 10"x10          Prone squeeze             Prone T  5"x20 5"x15 5"x15                                                Ther Ex             UBE  3'/3' 3' 3'/3'         Doorway pec  30"x2           UT stretch  30"x2 ea  30"x3 ea         SO stretch +rot  x10 ea  5"x10 ea         Sh' shrug contract-relax +UT stretch    10" + 10" x5 ea         ER pull apart             row hig row 45# 3x15 High row 25# 3x15 High row 25# 3x15         Sh' ext    25# 3x15                      Ther Activity                                       Gait Training                                       Modalities

## 2022-07-21 ENCOUNTER — PREP FOR PROCEDURE (OUTPATIENT)
Dept: GASTROENTEROLOGY | Facility: CLINIC | Age: 37
End: 2022-07-21

## 2022-07-21 ENCOUNTER — TELEPHONE (OUTPATIENT)
Dept: GASTROENTEROLOGY | Facility: CLINIC | Age: 37
End: 2022-07-21

## 2022-07-21 ENCOUNTER — OFFICE VISIT (OUTPATIENT)
Dept: GASTROENTEROLOGY | Facility: CLINIC | Age: 37
End: 2022-07-21
Payer: COMMERCIAL

## 2022-07-21 VITALS
BODY MASS INDEX: 25.81 KG/M2 | SYSTOLIC BLOOD PRESSURE: 122 MMHG | WEIGHT: 151.2 LBS | HEIGHT: 64 IN | DIASTOLIC BLOOD PRESSURE: 78 MMHG

## 2022-07-21 DIAGNOSIS — K59.09 OTHER CONSTIPATION: Primary | ICD-10-CM

## 2022-07-21 DIAGNOSIS — K21.9 GASTROESOPHAGEAL REFLUX DISEASE WITHOUT ESOPHAGITIS: Primary | ICD-10-CM

## 2022-07-21 DIAGNOSIS — K59.09 OTHER CONSTIPATION: ICD-10-CM

## 2022-07-21 DIAGNOSIS — Z86.010 HISTORY OF COLON POLYPS: ICD-10-CM

## 2022-07-21 PROCEDURE — 99244 OFF/OP CNSLTJ NEW/EST MOD 40: CPT | Performed by: NURSE PRACTITIONER

## 2022-07-21 RX ORDER — FAMOTIDINE 40 MG/1
40 TABLET, FILM COATED ORAL DAILY
Qty: 30 TABLET | Refills: 5 | Status: SHIPPED | OUTPATIENT
Start: 2022-07-21

## 2022-07-21 NOTE — LETTER
July 21, 2022     Esha Peck, [address]  [address]  [address] [address] [address]    Patient: Vikas Edwards   YOB: 1985   Date of Visit: 7/21/2022       Dear Dr Adolfo Marie: Thank you for referring Vikas Edwards to me for evaluation  Below are my notes for this consultation  If you have questions, please do not hesitate to call me  I look forward to following your patient along with you  Sincerely,        EUSEBIA Cummins        CC: No Recipients  EUSEBIA Cummins  7/21/2022  3:24 PM  Sign when Signing Visit    [address] - Outpatient Consultation  Vikas Edwards 31 y o  female MRN: [id number]  Encounter: [id number]    ASSESSMENT AND PLAN:      1  Other constipation  Longstanding history of constipation with hard formed bowel movement every 2-3 days, straining to defecate and feeling of incomplete evacuation  No improvement on Linzess previously and did not tolerate medication well  Denies rectal bleeding or other alarm symptoms  May be some element of dysmotility due to history of spinal cord injury but would recommend proceeding with colonoscopy to rule out colon stricture, large polyp or colon mass  -scheduled for colonoscopy at [facility]  - continue daily fiber supplement   -add MiraLax 1 capful daily and titrate dose for goal of soft bowel movement every 1-2 days  -discussed high-fiber diet and provided written instructions for patient  Encouragedpatient to drink at least 64 oz of water daily      2  GERD  Reports daily reflux symptoms with esophageal and throat pressure and liquid reflux with lying down after meals  No alarm symptoms   -add famotidine 40 mg daily in a m   -reviewed GERD diet and lifestyle modification    3  History of colon polyps  Prior colonoscopy 12/2017 performed due to chronic constipation   Small adenomatous polyp excised ,otherwise normal    Recall recommended in 5 years  -scheduled for colonoscopy as above    Followup Appointment:  3 months  ______________________________________________________________________    Chief Complaint   Patient presents with    Constipation    Diarrhea              HPI:   Marilin Cortez is a 31y o  year old female who presents today for chronic constipation  She sustained a spinal cord injury approximately 9 years ago and since then has been experience chronic constipation with hard formed bowel movements every 2-3 days  Feeling of incomplete evacuation and often needs to use digital  rectal stimulation to induce bowel movement  Denies lower abdominal pain or cramping but does experience generalized malaise with no bowel movement after several days   Denies melena or rectal bleeding-does experience occasional blood with wiping after bowel movement due to hemorrhoids    Previously treated with Linzess which did not really improve her constipation and she did not feel well with taking  Currently takes fiber supplement daily and occasional stool softener as needed    Reports daily reflux symptoms with esophageal and throat pressure and liquid reflux with lying down after meals  Denies dysphagia, no nausea or vomiting, appetite good and no recent unintentional weight loss      Prior colonoscopy 12/2017 small adenomatous polyp excised polyp excised ,otherwise normal  Recall recommended in 5 years      Historical Information   Past Medical History:   Diagnosis Date    Anxiety     Chronic pain disorder     back - thoracic area    Depression     Foot drop     Bilateral    History of spinal cord injury     at L1    Mild acid reflux     Small bowel problem     Chronic Constipation    Urge incontinence     Urinary incontinence May 30 2013    Use of cane as ambulatory aid     nguyen leg braces     Past Surgical History:   Procedure Laterality Date    BOTOX INJECTION N/A 7/29/2019    Procedure: INJECTION BOTULINUM TOXIN (BOTOX);   Surgeon: Adam Cancino MD; [facility];  Service: Urology    BOTOX INJECTION N/A 2021    Procedure: INJECTION BOTULINUM TOXIN (BOTOX), cystoscopy;  Surgeon: Janice Haq MD;  [facility];  Service: Urology    BOTOX INJECTION N/A 2022    Procedure: CYSTOSCOPY, INJECTION BOTULINUM TOXIN (BOTOX); Surgeon: Janice Haq MD;  [facility];  Service: Urology    COLONOSCOPY      CYSTOSCOPY      Diagnostic; onset: 1/3/17    FL MYELOGRAM 2 OR MORE REGIONS  2018    UT COLONOSCOPY FLX DX W/COLLJ SPEC WHEN PFRMD N/A 2017    Procedure: COLONOSCOPY;  Surgeon: Jannet Mckeon MD;  [facility];   Service: Gastroenterology    UT CYSTOURETHROSCOPY N/A 2019    Procedure: CYSTOSCOPY;  Surgeon: Rach Savage MD;  [facility];  Service: Urology    SPINAL FUSION      T12-L2     Social History     Substance and Sexual Activity   Alcohol Use Yes    Comment: liquor     Social History     Substance and Sexual Activity   Drug Use Not Currently    Types: Marijuana     Social History     Tobacco Use   Smoking Status Former Smoker    Packs/day: 0 70    Years: 1 00    Pack years: 0 70    Types: Cigarettes    Quit date: 2016    Years since quittin 6   Smokeless Tobacco Never Used     Family History   Problem Relation Age of Onset    Depression Mother     Diabetes Father     Lymphoma Father         Waldenstrom's    Heart disease Father         cardiac disorder    Hypertension Father     Pulmonary embolism Father     Hyperlipidemia Father     Cancer Father     Heart attack Father     Clotting disorder Father     No Known Problems Sister     No Known Problems Brother     Stroke Maternal Grandmother     Heart disease Paternal Mahnaz Rosin     Stroke Family     Colon polyps Neg Hx     Colon cancer Neg Hx        Meds/Allergies     Current Outpatient Medications:     amphetamine-dextroamphetamine (ADDERALL XR) 10 MG 24 hr capsule    busPIRone (BUSPAR) 10 mg tablet    cyclobenzaprine (FLEXERIL) 5 mg tablet    gabapentin (NEURONTIN) 300 mg capsule    hydrOXYzine HCL (ATARAX) 10 mg tablet    levonorgestrel (MIRENA) 20 MCG/24HR IUD    sertraline (ZOLOFT) 100 mg tablet    Current Facility-Administered Medications:     Botulinum Toxin Type A SOLR 200 Units, 200 Units, Injection, Once    Botulinum Toxin Type A SOLR 200 Units, 200 Units, Injection, Once    Allergies   Allergen Reactions    Sulfamethoxazole Nausea Only and GI Intolerance       PHYSICAL EXAM:    Blood pressure 122/78, height 5' 4" (1 626 m), weight 68 6 kg (151 lb 3 2 oz)  Body mass index is 25 95 kg/m²  General Appearance: NAD, cooperative, alert  Eyes: Anicteric  ENT:  Normocephalic, atraumatic, normal mucosa  Neck:  Supple, symmetrical, trachea midline,   Resp:  Clear to auscultation bilaterally; no rales, rhonchi or wheezing; respirations unlabored   CV:  S1 S2, Regular rate and rhythm; no murmur, rub, or gallop  GI:  Soft, non-tender, non-distended; normal bowel sounds; no masses, no organomegaly   Rectal: Deferred  Musculoskeletal: No cyanosis, clubbing or edema  Bilateral lower extremity braces due to weakness    Mild ambulatory dysfunction  Skin:  No jaundice, rashes, or lesions   Psych: Normal affect, good eye contact  Neuro: No gross deficits, AAOx3    Lab Results:   Lab Results   Component Value Date    WBC 6 25 04/07/2022    HGB 14 7 04/07/2022    HCT 43 2 04/07/2022    MCV 93 04/07/2022     04/07/2022     Lab Results   Component Value Date     06/21/2017    K 3 8 04/07/2022     (H) 04/07/2022    CO2 26 04/07/2022    ANIONGAP 8 12/28/2015    BUN 12 04/07/2022    CREATININE 0 63 04/07/2022    GLUCOSE 92 12/28/2015    GLUF 85 04/07/2022    CALCIUM 9 1 04/07/2022    AST 15 04/07/2022    ALT 22 04/07/2022    ALKPHOS 64 04/07/2022    PROT 7 1 06/21/2017    BILITOT 0 5 06/21/2017    EGFR 117 04/07/2022     Lab Results   Component Value Date    IRON 124 09/04/2018    FERRITIN 68 09/04/2018           REVIEW OF SYSTEMS:    CONSTITUTIONAL: Denies any fever, chills, rigors, and weight loss  HEENT: No earache or tinnitus  Denies hearing loss or visual disturbances  CARDIOVASCULAR: No chest pain or palpitations  RESPIRATORY: Denies any cough, hemoptysis, shortness of breath or dyspnea on exertion  GASTROINTESTINAL: As noted in the History of Present Illness  GENITOURINARY: No problems with urination  Denies any hematuria or dysuria  NEUROLOGIC: No dizziness or vertigo, denies headaches  MUSCULOSKELETAL: Denies any muscle or joint pain  SKIN: Denies skin rashes or itching  ENDOCRINE: Denies excessive thirst  Denies intolerance to heat or cold  PSYCHOSOCIAL: Denies depression or anxiety  Denies any recent memory loss

## 2022-07-21 NOTE — PROGRESS NOTES
[facility] - Outpatient Consultation  Opal Sandoval 31 y o  female MRN: [id number]  Encounter: [id number]    ASSESSMENT AND PLAN:      1  Other constipation  Longstanding history of constipation with hard formed bowel movement every 2-3 days, straining to defecate and feeling of incomplete evacuation  No improvement on Linzess previously and did not tolerate medication well  Denies rectal bleeding or other alarm symptoms  May be some element of dysmotility due to history of spinal cord injury but would recommend proceeding with colonoscopy to rule out colon stricture, large polyp or colon mass  -scheduled for colonoscopy at [facility]  - continue daily fiber supplement   -add MiraLax 1 capful daily and titrate dose for goal of soft bowel movement every 1-2 days  -discussed high-fiber diet and provided written instructions for patient  Encouragedpatient to drink at least 64 oz of water daily      2  GERD  Reports daily reflux symptoms with esophageal and throat pressure and liquid reflux with lying down after meals  No alarm symptoms   -add famotidine 40 mg daily in a m   -reviewed GERD diet and lifestyle modification    3  History of colon polyps  Prior colonoscopy 12/2017 performed due to chronic constipation  Small adenomatous polyp excised ,otherwise normal    Recall recommended in 5 years  -scheduled for colonoscopy as above    Followup Appointment:  3 months  ______________________________________________________________________    Chief Complaint   Patient presents with    Constipation    Diarrhea              HPI:   Opal Sandoval is a 31y o  year old female who presents today for chronic constipation  She sustained a spinal cord injury approximately 9 years ago and since then has been experience chronic constipation with hard formed bowel movements every 2-3 days    Feeling of incomplete evacuation and often needs to use digital  rectal stimulation to induce bowel movement  Denies lower abdominal pain or cramping but does experience generalized malaise with no bowel movement after several days   Denies melena or rectal bleeding-does experience occasional blood with wiping after bowel movement due to hemorrhoids    Previously treated with Linzess which did not really improve her constipation and she did not feel well with taking  Currently takes fiber supplement daily and occasional stool softener as needed    Reports daily reflux symptoms with esophageal and throat pressure and liquid reflux with lying down after meals  Denies dysphagia, no nausea or vomiting, appetite good and no recent unintentional weight loss      Prior colonoscopy 12/2017 small adenomatous polyp excised polyp excised ,otherwise normal  Recall recommended in 5 years      Historical Information   Past Medical History:   Diagnosis Date    Anxiety     Chronic pain disorder     back - thoracic area    Depression     Foot drop     Bilateral    History of spinal cord injury     at L1    Mild acid reflux     Small bowel problem     Chronic Constipation    Urge incontinence     Urinary incontinence May 30 2013    Use of cane as ambulatory aid     nguyen leg braces     Past Surgical History:   Procedure Laterality Date    BOTOX INJECTION N/A 7/29/2019    Procedure: INJECTION BOTULINUM TOXIN (BOTOX); Surgeon: Nash Erwin MD;  [facility];  Service: Urology    BOTOX INJECTION N/A 4/13/2021    Procedure: INJECTION BOTULINUM TOXIN (BOTOX), cystoscopy;  Surgeon: Adrain Phalen, MD;  [facility];  Service: Urology    BOTOX INJECTION N/A 2/7/2022    Procedure: CYSTOSCOPY, INJECTION BOTULINUM TOXIN (BOTOX);   Surgeon: Adrain Phalen, MD;  [facility];  Service: Urology    COLONOSCOPY      CYSTOSCOPY      Diagnostic; onset: 1/3/17    FL MYELOGRAM 2 OR MORE REGIONS  9/6/2018    MD COLONOSCOPY FLX DX W/COLLJ SPEC WHEN PFRMD N/A 12/12/2017    Procedure: COLONOSCOPY; Surgeon: Cal Cueva MD;  [facility];   Service: Gastroenterology    NE CYSTOURETHROSCOPY N/A 2019    Procedure: CYSTOSCOPY;  Surgeon: Carlos Garcia MD;  Location:  MAIN OR;  Service: Urology    SPINAL FUSION  2013    T12-L2     Social History     Substance and Sexual Activity   Alcohol Use Yes    Comment: liquor     Social History     Substance and Sexual Activity   Drug Use Not Currently    Types: Marijuana     Social History     Tobacco Use   Smoking Status Former Smoker    Packs/day: 0 70    Years: 1 00    Pack years: 0 70    Types: Cigarettes    Quit date: 2016    Years since quittin 6   Smokeless Tobacco Never Used     Family History   Problem Relation Age of Onset    Depression Mother     Diabetes Father     Lymphoma Father         Waldenstrom's    Heart disease Father         cardiac disorder    Hypertension Father     Pulmonary embolism Father     Hyperlipidemia Father     Cancer Father     Heart attack Father     Clotting disorder Father     No Known Problems Sister     No Known Problems Brother     Stroke Maternal Grandmother     Heart disease Paternal Daniela Fredo     Stroke Family     Colon polyps Neg Hx     Colon cancer Neg Hx        Meds/Allergies     Current Outpatient Medications:     amphetamine-dextroamphetamine (ADDERALL XR) 10 MG 24 hr capsule    busPIRone (BUSPAR) 10 mg tablet    cyclobenzaprine (FLEXERIL) 5 mg tablet    gabapentin (NEURONTIN) 300 mg capsule    hydrOXYzine HCL (ATARAX) 10 mg tablet    levonorgestrel (MIRENA) 20 MCG/24HR IUD    sertraline (ZOLOFT) 100 mg tablet    Current Facility-Administered Medications:     Botulinum Toxin Type A SOLR 200 Units, 200 Units, Injection, Once    Botulinum Toxin Type A SOLR 200 Units, 200 Units, Injection, Once    Allergies   Allergen Reactions    Sulfamethoxazole Nausea Only and GI Intolerance       PHYSICAL EXAM:    Blood pressure 122/78, height 5' 4" (1 626 m), weight 68 6 kg (151 lb 3 2 oz)  Body mass index is 25 95 kg/m²  General Appearance: NAD, cooperative, alert  Eyes: Anicteric  ENT:  Normocephalic, atraumatic, normal mucosa  Neck:  Supple, symmetrical, trachea midline,   Resp:  Clear to auscultation bilaterally; no rales, rhonchi or wheezing; respirations unlabored   CV:  S1 S2, Regular rate and rhythm; no murmur, rub, or gallop  GI:  Soft, non-tender, non-distended; normal bowel sounds; no masses, no organomegaly   Rectal: Deferred  Musculoskeletal: No cyanosis, clubbing or edema  Bilateral lower extremity braces due to weakness  Mild ambulatory dysfunction  Skin:  No jaundice, rashes, or lesions   Psych: Normal affect, good eye contact  Neuro: No gross deficits, AAOx3    Lab Results:   Lab Results   Component Value Date    WBC 6 25 04/07/2022    HGB 14 7 04/07/2022    HCT 43 2 04/07/2022    MCV 93 04/07/2022     04/07/2022     Lab Results   Component Value Date     06/21/2017    K 3 8 04/07/2022     (H) 04/07/2022    CO2 26 04/07/2022    ANIONGAP 8 12/28/2015    BUN 12 04/07/2022    CREATININE 0 63 04/07/2022    GLUCOSE 92 12/28/2015    GLUF 85 04/07/2022    CALCIUM 9 1 04/07/2022    AST 15 04/07/2022    ALT 22 04/07/2022    ALKPHOS 64 04/07/2022    PROT 7 1 06/21/2017    BILITOT 0 5 06/21/2017    EGFR 117 04/07/2022     Lab Results   Component Value Date    IRON 124 09/04/2018    FERRITIN 68 09/04/2018           REVIEW OF SYSTEMS:    CONSTITUTIONAL: Denies any fever, chills, rigors, and weight loss  HEENT: No earache or tinnitus  Denies hearing loss or visual disturbances  CARDIOVASCULAR: No chest pain or palpitations  RESPIRATORY: Denies any cough, hemoptysis, shortness of breath or dyspnea on exertion  GASTROINTESTINAL: As noted in the History of Present Illness  GENITOURINARY: No problems with urination  Denies any hematuria or dysuria  NEUROLOGIC: No dizziness or vertigo, denies headaches  MUSCULOSKELETAL: Denies any muscle or joint pain  SKIN: Denies skin rashes or itching  ENDOCRINE: Denies excessive thirst  Denies intolerance to heat or cold  PSYCHOSOCIAL: Denies depression or anxiety  Denies any recent memory loss

## 2022-07-21 NOTE — PATIENT INSTRUCTIONS
Take MiraLax 1 capful daily -titrate dosing for goal of soft bowel movement every 1-2 days    Can decrease dosing as needed to 1/2 capful or 1 full capful every other day

## 2022-07-21 NOTE — TELEPHONE ENCOUNTER
Scheduled date of colonoscopy (as of today): 9/1/22  Physician performing colonoscopy: Dr Refugio Whittaker  Location of colonoscopy: [facility]  Bowel prep reviewed with patient: miralax/gatorade  Instructions reviewed with patient by: Kalina Nieves  Clearances: no

## 2022-07-28 ENCOUNTER — APPOINTMENT (OUTPATIENT)
Dept: PHYSICAL THERAPY | Facility: CLINIC | Age: 37
End: 2022-07-28
Payer: COMMERCIAL

## 2022-08-04 ENCOUNTER — OFFICE VISIT (OUTPATIENT)
Dept: PHYSICAL THERAPY | Facility: CLINIC | Age: 37
End: 2022-08-04
Payer: COMMERCIAL

## 2022-08-04 DIAGNOSIS — M54.2 NECK PAIN: Primary | ICD-10-CM

## 2022-08-04 DIAGNOSIS — M54.2 CERVICALGIA: ICD-10-CM

## 2022-08-04 PROCEDURE — 97110 THERAPEUTIC EXERCISES: CPT | Performed by: PHYSICAL THERAPIST

## 2022-08-04 PROCEDURE — 97112 NEUROMUSCULAR REEDUCATION: CPT | Performed by: PHYSICAL THERAPIST

## 2022-08-04 PROCEDURE — 97140 MANUAL THERAPY 1/> REGIONS: CPT | Performed by: PHYSICAL THERAPIST

## 2022-08-04 NOTE — PROGRESS NOTES
Daily Note - d/c summary     Today's date: 2022  Patient name: Kan Campos  : 1985  MRN: [id number]  Referring provider: Julia Mejia DO  Dx:   Encounter Diagnosis     ICD-10-CM    1  Neck pain  M54 2    2  Cervicalgia  M54 2        Start Time: 923          Subjective: Neck is feeling okay, doing better  Less tightness and she feels she is more tolerable to sustained neck flexion while she is working and doing puzzles  Ready for d/c for the neck at this time  Objective: See treatment diary below      Assessment: Kan Campos has attended physical therapy for 5 visits  In this time, they have made significant improvements in symptoms and function, as noted by subjective report, improved balance, ROM, strength, flexibility and activity tolerance  They have made positive goal progress  Recommend d/c to HEP at this time  Plan: D/c to HEP  Precautions: Other factors: h/o spinal cord injury  Pt goals:  EPOC: 22      HEP:  Access Code: [facility]  URL: [email or web address]  Date: 2022  Prepared by: Norbert Morales    Exercises  · Seated TMJ Opening with Unilateral Facilitation with Finger  · Seated Upper Trapezius Stretch - 3 sets - 20 sec hold  · Seated Levator Scapulae Stretch - 3 sets - 20 sec hold  · Doorway Pec Stretch at 90 Degrees Abduction - 20 sec hold  · Backward shoulder rolls - 10 reps            Manuals  7 8/4        TMJ active release JOSE JOSE           Cervical STM [facility] JOSE        UT stretch, active release JOSE  JOSE          pec release [facility]     JOSE                                               Neuro Re-Ed             Chin tuck             DNF  10"x10 10"x10          Prone squeeze             Prone T  5"x20 5"x15 5"x15 5"x15                                               Ther Ex             UBE  3'/3' 3' 3'/3' 3'/3'        Doorway pec  30"x2           UT stretch  30"x2 ea  30"x3 ea         SO stretch +rot  x10 ea 5"x10 ea         Sh' shrug contract-relax +UT stretch    10" + 10" x5 ea         ER pull apart             row  hig row 45# 3x15 High row 25# 3x15 High row 25# 3x15 High row 25# 3x15        Sh' ext    25# 3x15 25# 3x15                     Ther Activity                                       Gait Training                                       Modalities

## 2022-08-19 ENCOUNTER — TELEPHONE (OUTPATIENT)
Dept: FAMILY MEDICINE CLINIC | Facility: HOSPITAL | Age: 37
End: 2022-08-19

## 2022-08-19 NOTE — TELEPHONE ENCOUNTER
PATIENT STATES THAT SHE HAS SINUS PRESSURE FOR ABOUT 4 DAYS - HAS TRIED OTC REMEDIES WITH NO RELIEF - ALSO TOOK HOME COVID TEST WHICH WAS NEGATIVE - PLEASE ADVISE IN OFFICE APPT OR VIRTUAL?

## 2022-08-19 NOTE — TELEPHONE ENCOUNTER
Definition of sinusitis is more then 10 days of symptoms, most colds are viruses and last 7-10 days  Therefore, the first 7-10 days of cold symptoms are treated with OTC decongestant (Sudafed/Muxinex/Advil cold and sinus) and OTC cough syrup if needed (Robitusin/Delsym)    IF not better by middle of next week can be seen in office

## 2022-08-19 NOTE — TELEPHONE ENCOUNTER
Pt complains of having dry nasal passages, feeling sore in her nose  Having pain in her face also for the last few days  Has been trying allergy relief and nasal spray without relief  Pt is asking if there is anything else she can try/do? Should she be seen? Please advise

## 2022-08-23 ENCOUNTER — TELEPHONE (OUTPATIENT)
Dept: GASTROENTEROLOGY | Facility: CLINIC | Age: 37
End: 2022-08-23

## 2022-08-23 NOTE — TELEPHONE ENCOUNTER
Patients GI provider:  Dr Hewitt [place]    Number to return call: [phone]    Reason for call: Pt calling to reschedule her procedure  Above is her number       Scheduled procedure/appointment date if applicable: procedure 9/5/53

## 2022-10-12 NOTE — TELEPHONE ENCOUNTER
Kim-pt cancelled colon ordered from last ov, was contacted to reschedule with no response   Pt does have an upcoming ov scheduled with you on 10/20/22

## 2022-11-08 ENCOUNTER — OFFICE VISIT (OUTPATIENT)
Dept: FAMILY MEDICINE CLINIC | Facility: HOSPITAL | Age: 37
End: 2022-11-08

## 2022-11-08 VITALS
BODY MASS INDEX: 25.81 KG/M2 | HEART RATE: 100 BPM | OXYGEN SATURATION: 98 % | TEMPERATURE: 98.7 F | HEIGHT: 64 IN | WEIGHT: 151.2 LBS | SYSTOLIC BLOOD PRESSURE: 120 MMHG | DIASTOLIC BLOOD PRESSURE: 68 MMHG

## 2022-11-08 DIAGNOSIS — S34.101D INJURY TO L1 LEVEL OF SPINAL CORD, SUBSEQUENT ENCOUNTER (HCC): ICD-10-CM

## 2022-11-08 DIAGNOSIS — M54.6 CHRONIC BILATERAL THORACIC BACK PAIN: ICD-10-CM

## 2022-11-08 DIAGNOSIS — M54.16 LUMBAR RADICULOPATHY: ICD-10-CM

## 2022-11-08 DIAGNOSIS — G89.29 CHRONIC BILATERAL THORACIC BACK PAIN: ICD-10-CM

## 2022-11-08 DIAGNOSIS — M54.2 NECK PAIN: Primary | ICD-10-CM

## 2022-11-08 NOTE — PROGRESS NOTES
Assessment/Plan:     I suspect neck pain is muscular in nature  Continue with Flexeril  Add OTC NSAIDs  Referral to PT  Call if worsening or fails to improve  Referral to spine and pain for chronic back pain     Diagnoses and all orders for this visit:    Neck pain  -     Ambulatory Referral to Physical Therapy; Future    Injury to L1 level of spinal cord, subsequent encounter [facility])  -     Ambulatory Referral to Pain Management; Future    Lumbar radiculopathy  -     Ambulatory Referral to Pain Management; Future    Chronic bilateral thoracic back pain  -     Ambulatory Referral to Pain Management; Future          Subjective:     Patient ID: Marquez Saini is a 30 y o  female  Last week started with upper back and neck pain after working on her art for long period  Has been trying to do stretches and pain worsened  Tightness is entire neck and down right arm  Sharp pain over spine itself  Has tingling in both arms R > L  Has had pain like this before and did PT and rested and it did get better  Has been taking Flexeril she has with some relief  Not taking any OTC for pain control  Typically these do not work for her  No fever or chills  Uses cane typically  H/o spinal cord injury with chronic back pain  Used to see a spine specialist years ago and is requesting to see someone again  Review of Systems   Constitutional: Negative for chills and fever  Musculoskeletal: Positive for neck pain and neck stiffness  Neurological: Positive for numbness  Negative for weakness  The following portions of the patient's history were reviewed and updated as appropriate: allergies, current medications, past family history, past medical history, past social history, past surgical history and problem list     Objective:  Vitals:    11/08/22 1517   BP: 120/68   Pulse: 100   Temp: 98 7 °F (37 1 °C)   SpO2: 98%      Physical Exam  Vitals reviewed  Constitutional:       Appearance: Normal appearance  Cardiovascular:      Rate and Rhythm: Normal rate and regular rhythm  Heart sounds: Normal heart sounds  Pulmonary:      Effort: Pulmonary effort is normal       Breath sounds: Normal breath sounds  Musculoskeletal:      Cervical back: Tenderness (over trapezius B/L) present  No deformity or bony tenderness  Pain with movement present  Decreased range of motion  Skin:     General: Skin is warm and dry  Neurological:      Mental Status: She is alert and oriented to person, place, and time  Motor: No weakness  Psychiatric:         Mood and Affect: Mood normal          Behavior: Behavior normal          Thought Content:  Thought content normal          Judgment: Judgment normal

## 2023-01-09 ENCOUNTER — APPOINTMENT (OUTPATIENT)
Dept: LAB | Facility: HOSPITAL | Age: 38
End: 2023-01-09

## 2023-01-09 DIAGNOSIS — Z79.899 ENCOUNTER FOR LONG-TERM (CURRENT) USE OF MEDICATIONS: ICD-10-CM

## 2023-01-09 DIAGNOSIS — F41.1 ANXIETY STATE, NEUROTIC: ICD-10-CM

## 2023-01-09 DIAGNOSIS — F90.9 HYPERACTIVITY: ICD-10-CM

## 2023-01-09 LAB
ALBUMIN SERPL BCP-MCNC: 3.7 G/DL (ref 3.5–5)
ALP SERPL-CCNC: 54 U/L (ref 46–116)
ALT SERPL W P-5'-P-CCNC: 22 U/L (ref 12–78)
ANION GAP SERPL CALCULATED.3IONS-SCNC: 5 MMOL/L (ref 4–13)
AST SERPL W P-5'-P-CCNC: 15 U/L (ref 5–45)
BILIRUB DIRECT SERPL-MCNC: 0.1 MG/DL (ref 0–0.2)
BILIRUB SERPL-MCNC: 0.58 MG/DL (ref 0.2–1)
BUN SERPL-MCNC: 14 MG/DL (ref 5–25)
CALCIUM SERPL-MCNC: 8.9 MG/DL (ref 8.3–10.1)
CHLORIDE SERPL-SCNC: 108 MMOL/L (ref 96–108)
CHOLEST SERPL-MCNC: 211 MG/DL
CO2 SERPL-SCNC: 26 MMOL/L (ref 21–32)
CREAT SERPL-MCNC: 0.62 MG/DL (ref 0.6–1.3)
EST. AVERAGE GLUCOSE BLD GHB EST-MCNC: 88 MG/DL
GFR SERPL CREATININE-BSD FRML MDRD: 117 ML/MIN/1.73SQ M
GLUCOSE P FAST SERPL-MCNC: 88 MG/DL (ref 65–99)
HBA1C MFR BLD: 4.7 %
HDLC SERPL-MCNC: 63 MG/DL
HGB BLD-MCNC: 14.4 G/DL (ref 11.5–15.4)
LDLC SERPL CALC-MCNC: 123 MG/DL (ref 0–100)
NONHDLC SERPL-MCNC: 148 MG/DL
POTASSIUM SERPL-SCNC: 4.1 MMOL/L (ref 3.5–5.3)
PROT SERPL-MCNC: 7 G/DL (ref 6.4–8.4)
SODIUM SERPL-SCNC: 139 MMOL/L (ref 135–147)
T4 SERPL-MCNC: 7.3 UG/DL (ref 4.7–13.3)
TRIGL SERPL-MCNC: 126 MG/DL
TSH SERPL DL<=0.05 MIU/L-ACNC: 1.36 UIU/ML (ref 0.45–4.5)

## 2023-02-10 ENCOUNTER — APPOINTMENT (OUTPATIENT)
Dept: LAB | Facility: HOSPITAL | Age: 38
End: 2023-02-10

## 2023-02-10 ENCOUNTER — OFFICE VISIT (OUTPATIENT)
Dept: FAMILY MEDICINE CLINIC | Facility: HOSPITAL | Age: 38
End: 2023-02-10

## 2023-02-10 VITALS
HEIGHT: 64 IN | SYSTOLIC BLOOD PRESSURE: 116 MMHG | TEMPERATURE: 98.4 F | OXYGEN SATURATION: 98 % | DIASTOLIC BLOOD PRESSURE: 74 MMHG | HEART RATE: 80 BPM | WEIGHT: 149.8 LBS | BODY MASS INDEX: 25.57 KG/M2

## 2023-02-10 DIAGNOSIS — R07.89 CHEST TIGHTNESS: ICD-10-CM

## 2023-02-10 DIAGNOSIS — R07.89 CHEST TIGHTNESS: Primary | ICD-10-CM

## 2023-02-10 PROBLEM — F90.9 ADHD: Status: ACTIVE | Noted: 2023-02-10

## 2023-02-10 LAB — D DIMER PPP FEU-MCNC: <0.27 UG/ML FEU

## 2023-02-10 RX ORDER — ALBUTEROL SULFATE 90 UG/1
2 AEROSOL, METERED RESPIRATORY (INHALATION) EVERY 6 HOURS PRN
Qty: 18 G | Refills: 1 | Status: SHIPPED | OUTPATIENT
Start: 2023-02-10

## 2023-02-10 RX ORDER — DEXTROAMPHETAMINE SACCHARATE, AMPHETAMINE ASPARTATE MONOHYDRATE, DEXTROAMPHETAMINE SULFATE AND AMPHETAMINE SULFATE 3.75; 3.75; 3.75; 3.75 MG/1; MG/1; MG/1; MG/1
15 CAPSULE, EXTENDED RELEASE ORAL
COMMUNITY
Start: 2023-01-31

## 2023-02-10 RX ORDER — SERTRALINE HYDROCHLORIDE 100 MG/1
TABLET, FILM COATED ORAL
COMMUNITY
Start: 2023-01-27

## 2023-02-10 RX ORDER — SERTRALINE HYDROCHLORIDE 25 MG/1
25 TABLET, FILM COATED ORAL EVERY MORNING
COMMUNITY
Start: 2023-01-02

## 2023-02-10 NOTE — PROGRESS NOTES
Name: Jamar John      : 1985      MRN: [id number]  Encounter Provider: Urmila Morillo DO  Encounter Date: 2/10/2023   Encounter department: [facility]  Chest tightness  Comments:  Likely URI BUT pt with recent COVID () as well as mult RF for PE - family history, smoker, recent car trip, clinically she appears well w/o any tachycardia/hypoxia and ECG nml, discussed D-dimer and if + will need CT chest, if neg will watch closely and tx resp infection with abx and albuterol and OTC cough and cold meds, red flag CV/pulm symptoms reviewed - to ED if they occur, will follow  Orders:  -     D-dimer, quantitative; Future  -     albuterol (ProAir HFA) 90 mcg/act inhaler; Inhale 2 puffs every 6 (six) hours as needed for wheezing or shortness of breath  -     POCT ECG           Subjective      HPI Pt here for an acute visit    Pt noting 3-4 days of chest tightness  She notes no recent F/C/cough/wheezing  She has felt like "I can't get enough air in and my lungs feel really tight and heavy"  She has mild congestion and runny nose  She notes no ear pain but has some ST  She is smoking again since the fall  She has a mild cough but notes no coughing up blood  She had COVID in the fall  She has tried fluid and rest for her symptoms  She denies h/o personal DVT/PE but her Dad had a PE  She notes no recent air travel but did just drive 4 hrs to [place] 2 wks ago  She has had no recent surgeries  Review of Systems   Constitutional: Negative for chills and fever  HENT: Positive for congestion, postnasal drip, rhinorrhea and sore throat  Eyes: Negative for discharge  Respiratory: Positive for cough, chest tightness and shortness of breath  Negative for wheezing  Cardiovascular: Negative for chest pain, palpitations and leg swelling  Gastrointestinal: Negative for abdominal pain, diarrhea, nausea and vomiting  Genitourinary: Negative for difficulty urinating and dysuria  Skin: Negative for rash and wound  Neurological: Positive for light-headedness and headaches  Negative for syncope  Hematological: Negative for adenopathy  Current Outpatient Medications on File Prior to Visit   Medication Sig   • amphetamine-dextroamphetamine (ADDERALL XR) 15 MG 24 hr capsule Take 15 mg by mouth daily in the early morning   • busPIRone (BUSPAR) 10 mg tablet Take 1 tablet by mouth 3 (three) times a day   • cyclobenzaprine (FLEXERIL) 5 mg tablet Take 1 tablet (5 mg total) by mouth 3 (three) times a day as needed for muscle spasms   • famotidine (PEPCID) 40 MG tablet Take 1 tablet (40 mg total) by mouth daily   • gabapentin (NEURONTIN) 300 mg capsule Take 1 capsule by mouth 3 (three) times a day   • hydrOXYzine HCL (ATARAX) 10 mg tablet As needed for anxiety  (Patient not taking: Reported on 11/8/2022)   • levonorgestrel (MIRENA) 20 MCG/24HR IUD 20 mcg/day   • sertraline (ZOLOFT) 100 mg tablet TAKE ONE TABLET DAILY AM WITH 25MG (TOTAL = 125MG)  • sertraline (ZOLOFT) 25 mg tablet 25 mg every morning   • [DISCONTINUED] amphetamine-dextroamphetamine (ADDERALL XR) 10 MG 24 hr capsule TAKE 1 CAPSULE BY MOUTH EVERY MORNING (SWALLOW WHOLE)       Objective     /74   Pulse 80   Temp 98 4 °F (36 9 °C) (Tympanic)   Ht 5' 4" (1 626 m)   Wt 67 9 kg (149 lb 12 8 oz)   SpO2 98%   BMI 25 71 kg/m²     Physical Exam  Vitals and nursing note reviewed  Constitutional:       General: She is not in acute distress  Appearance: She is well-developed  She is not ill-appearing  HENT:      Head: Normocephalic and atraumatic  Right Ear: Tympanic membrane and external ear normal  There is no impacted cerumen  Left Ear: Tympanic membrane and external ear normal  There is no impacted cerumen  Eyes:      General:         Right eye: No discharge  Left eye: No discharge        Conjunctiva/sclera: Conjunctivae normal  Neck:      Trachea: No tracheal deviation  Cardiovascular:      Rate and Rhythm: Normal rate and regular rhythm  Heart sounds: Normal heart sounds  No murmur heard  No friction rub  Pulmonary:      Effort: Pulmonary effort is normal  No respiratory distress  Breath sounds: Normal breath sounds  No wheezing or rales  Musculoskeletal:      Cervical back: Neck supple  Comments: B/L LE: braced in place, no LE edema, neg Cam's sign, no calf tenderness   Lymphadenopathy:      Cervical: No cervical adenopathy  Skin:     General: Skin is warm  Coloration: Skin is not pale  Findings: No rash  Neurological:      Mental Status: She is alert  Motor: No abnormal muscle tone  Gait: Gait abnormal    Psychiatric:         Behavior: Behavior normal          Thought Content:  Thought content normal          Judgment: Judgment normal        Tina Pert, DO

## 2023-05-01 ENCOUNTER — OFFICE VISIT (OUTPATIENT)
Dept: FAMILY MEDICINE CLINIC | Facility: HOSPITAL | Age: 38
End: 2023-05-01

## 2023-05-01 VITALS
BODY MASS INDEX: 25.44 KG/M2 | DIASTOLIC BLOOD PRESSURE: 80 MMHG | OXYGEN SATURATION: 98 % | TEMPERATURE: 98.4 F | HEART RATE: 83 BPM | HEIGHT: 64 IN | SYSTOLIC BLOOD PRESSURE: 110 MMHG | WEIGHT: 149 LBS

## 2023-05-01 DIAGNOSIS — Z11.3 SCREENING FOR STD (SEXUALLY TRANSMITTED DISEASE): ICD-10-CM

## 2023-05-01 DIAGNOSIS — Z23 ENCOUNTER FOR IMMUNIZATION: ICD-10-CM

## 2023-05-01 DIAGNOSIS — Z00.00 ANNUAL PHYSICAL EXAM: Primary | ICD-10-CM

## 2023-05-01 DIAGNOSIS — Z91.89 AT RISK FOR SEXUALLY TRANSMITTED DISEASE DUE TO UNPROTECTED SEX: ICD-10-CM

## 2023-05-01 RX ORDER — CARBAMAZEPINE 100 MG/1
100 TABLET, EXTENDED RELEASE ORAL DAILY
COMMUNITY

## 2023-05-01 NOTE — PATIENT INSTRUCTIONS
Wellness Visit for Adults   AMBULATORY CARE:   A wellness visit  is when you see your healthcare provider to get screened for health problems  Your healthcare provider will also give you advice on how to stay healthy  Write down your questions so you remember to ask them  Ask your healthcare provider how often you should have a wellness visit  What happens at a wellness visit:  Your healthcare provider will ask about your health, and your family history of health problems  This includes high blood pressure, heart disease, and cancer  He or she will ask if you have symptoms that concern you, if you smoke, and about your mood  You may also be asked about your intake of medicines, supplements, food, and alcohol  Any of the following may be done:   Your weight  will be checked  Your height may also be checked so your body mass index (BMI) can be calculated  Your BMI shows if you are at a healthy weight   Your blood pressure  and heart rate will be checked  Your temperature may also be checked   Blood and urine tests  may be done  Blood tests may be done to check your cholesterol levels  Abnormal cholesterol levels increase your risk for heart disease and stroke  You may also need a blood or urine test to check for diabetes if you are at increased risk  Urine tests may be done to look for signs of an infection or kidney disease   A physical exam  includes checking your heartbeat and lungs with a stethoscope  Your healthcare provider may also check your skin to look for sun damage   Screening tests  may be recommended  A screening test is done to check for diseases that may not cause symptoms  The screening tests you may need depend on your age, gender, family history, and lifestyle habits  For example, colorectal screening may be recommended if you are 48years old or older  Screening tests you need if you are a woman:    A Pap smear  is used to screen for cervical cancer   Pap smears are usually done every 3 to 5 years depending on your age  You may need them more often if you have had abnormal Pap smear test results in the past  Ask your healthcare provider how often you should have a Pap smear   A mammogram  is an x-ray of your breasts to screen for breast cancer  Experts recommend mammograms every 2 years starting at age 48 years  You may need a mammogram at age 52 years or younger if you have an increased risk for breast cancer  Talk to your healthcare provider about when you should start having mammograms and how often you need them  Vaccines you may need:    Get an influenza vaccine  every year  The influenza vaccine protects you from the flu  Several types of viruses cause the flu  The viruses change over time, so new vaccines are made each year   Get a tetanus-diphtheria (Td) booster vaccine  every 10 years  This vaccine protects you against tetanus and diphtheria  Tetanus is a severe infection that may cause painful muscle spasms and lockjaw  Diphtheria is a severe bacterial infection that causes a thick covering in the back of your mouth and throat   Get a human papillomavirus (HPV) vaccine  if you are female and aged 23 to 32 or male 23 to 24 and never received it  This vaccine protects you from HPV infection  HPV is the most common infection spread by sexual contact  HPV may also cause vaginal, penile, and anal cancers   Get a pneumococcal vaccine  if you are aged 72 years or older  The pneumococcal vaccine is an injection given to protect you from pneumococcal disease  Pneumococcal disease is an infection caused by pneumococcal bacteria  The infection may cause pneumonia, meningitis, or an ear infection   Get a shingles vaccine  if you are 61 or older, even if you have had shingles before  The shingles vaccine is an injection to protect you from the varicella-zoster virus  This is the same virus that causes chickenpox   Shingles is a painful rash that develops in people who had chickenpox or have been exposed to the virus  How to eat healthy:  My Plate is a model for planning healthy meals  It shows the types and amounts of foods that should go on your plate  Fruits and vegetables make up about half of your plate, and grains and protein make up the other half  A serving of dairy is included on the side of your plate  The amount of calories and serving sizes you need depends on your age, gender, weight, and height  Examples of healthy foods are listed below:   Eat a variety of vegetables  such as dark green, red, and orange vegetables  You can also include canned vegetables low in sodium (salt) and frozen vegetables without added butter or sauces   Eat a variety of fresh fruits , canned fruit in 100% juice, frozen fruit, and dried fruit   Include whole grains  At least half of the grains you eat should be whole grains  Examples include whole-wheat bread, wheat pasta, brown rice, and whole-grain cereals such as oatmeal      Eat a variety of protein foods such as seafood (fish and shellfish), lean meat, and poultry without skin (turkey and chicken)  Examples of lean meats include pork leg, shoulder, or tenderloin, and beef round, sirloin, tenderloin, and extra lean ground beef  Other protein foods include eggs and egg substitutes, beans, peas, soy products, nuts, and seeds   Choose low-fat dairy products such as skim or 1% milk or low-fat yogurt, cheese, and cottage cheese   Limit unhealthy fats  such as butter, hard margarine, and shortening  Exercise:  Exercise at least 30 minutes per day on most days of the week  Some examples of exercise include walking, biking, dancing, and swimming  You can also fit in more physical activity by taking the stairs instead of the elevator or parking farther away from stores  Include muscle strengthening activities 2 days each week  Regular exercise provides many health benefits   It helps you manage your weight, and decreases your risk for type 2 diabetes, heart disease, stroke, and high blood pressure  Exercise can also help improve your mood  Ask your healthcare provider about the best exercise plan for you  General health and safety guidelines:    Do not smoke  Nicotine and other chemicals in cigarettes and cigars can cause lung damage  Ask your healthcare provider for information if you currently smoke and need help to quit  E-cigarettes or smokeless tobacco still contain nicotine  Talk to your healthcare provider before you use these products   Limit alcohol  A drink of alcohol is 12 ounces of beer, 5 ounces of wine, or 1½ ounces of liquor   Lose weight, if needed  Being overweight increases your risk of certain health conditions  These include heart disease, high blood pressure, type 2 diabetes, and certain types of cancer   Protect your skin  Do not sunbathe or use tanning beds  Use sunscreen with a SPF 15 or higher  Apply sunscreen at least 15 minutes before you go outside  Reapply sunscreen every 2 hours  Wear protective clothing, hats, and sunglasses when you are outside   Drive safely  Always wear your seatbelt  Make sure everyone in your car wears a seatbelt  A seatbelt can save your life if you are in an accident  Do not use your cell phone when you are driving  This could distract you and cause an accident  Pull over if you need to make a call or send a text message   Practice safe sex  Use latex condoms if are sexually active and have more than one partner  Your healthcare provider may recommend screening tests for sexually transmitted infections (STIs)   Wear helmets, lifejackets, and protective gear  Always wear a helmet when you ride a bike or motorcycle, go skiing, or play sports that could cause a head injury  Wear protective equipment when you play sports  Wear a lifejacket when you are on a boat or doing water sports      © Copyright [employer] 2022 Information is for End User's use only and may not be sold, redistributed or otherwise used for commercial purposes  The above information is an  only  It is not intended as medical advice for individual conditions or treatments  Talk to your doctor, nurse or pharmacist before following any medical regimen to see if it is safe and effective for you  Weight Management   AMBULATORY CARE:   Why it is important to manage your weight:  Being overweight increases your risk of health conditions such as heart disease, high blood pressure, type 2 diabetes, and certain types of cancer  It can also increase your risk for osteoarthritis, sleep apnea, and other respiratory problems  Aim for a slow, steady weight loss  Even a small amount of weight loss can lower your risk of health problems  Risks of being overweight:  Extra weight can cause many health problems, including the following:   Diabetes (high blood sugar level)     High blood pressure or high cholesterol     Heart disease     Stroke     Gallbladder or liver disease     Cancer of the colon, breast, prostate, liver, or kidney     Sleep apnea     Arthritis or gout    Screening  is done to check for health conditions before you have signs or symptoms  If you are 28to 79years old, your blood sugar level may be checked every 3 years for signs of prediabetes or diabetes  Your healthcare provider will check your blood pressure at each visit  High blood pressure can lead to a stroke or other problems  Your provider may check for signs of heart disease, cancer, or other health problems  How to lose weight safely:  A safe and healthy way to lose weight is to eat fewer calories and get regular exercise   You can lose up about 1 pound a week by decreasing the number of calories you eat by 500 calories each day  You can decrease calories by eating smaller portion sizes or by cutting out high-calorie foods   Read labels to find out how many calories are in the foods you eat          Jennfier Sorrel You can also burn calories with exercise such as walking, swimming, or biking  You will be more likely to keep weight off if you make these changes part of your lifestyle  Exercise at least 30 minutes per day on most days of the week  You can also fit in more physical activity by taking the stairs instead of the elevator or parking farther away from stores  Ask your healthcare provider about the best exercise plan for you  Healthy meal plan for weight management:  A healthy meal plan includes a variety of foods, contains fewer calories, and helps you stay healthy  A healthy meal plan includes the following:      Eat whole-grain foods more often  A healthy meal plan should contain fiber  Fiber is the part of grains, fruits, and vegetables that is not broken down by your body  Whole-grain foods are healthy and provide extra fiber in your diet  Some examples of whole-grain foods are whole-wheat breads and pastas, oatmeal, brown rice, and bulgur   Eat a variety of vegetables every day  Include dark, leafy greens such as spinach, kale, hayden greens, and mustard greens  Eat yellow and orange vegetables such as carrots, sweet potatoes, and winter squash   Eat a variety of fruits every day  Choose fresh or canned fruit (canned in its own juice or light syrup) instead of juice  Fruit juice has very little or no fiber   Eat low-fat dairy foods  Drink fat-free (skim) milk or 1% milk  Eat fat-free yogurt and low-fat cottage cheese  Try low-fat cheeses such as mozzarella and other reduced-fat cheeses   Choose meat and other protein foods that are low in fat  Choose beans or other legumes such as split peas or lentils  Choose fish, skinless poultry (chicken or turkey), or lean cuts of red meat (beef or pork)  Before you cook meat or poultry, cut off any visible fat   Use less fat and oil  Try baking foods instead of frying them   Add less fat, such as margarine, sour cream, regular salad dressing and mayonnaise to foods  Eat fewer high-fat foods  Some examples of high-fat foods include french fries, doughnuts, ice cream, and cakes   Eat fewer sweets  Limit foods and drinks that are high in sugar  This includes candy, cookies, regular soda, and sweetened drinks  Ways to decrease calories:    Eat smaller portions  ? Use a small plate with smaller servings  ? Do not eat second helpings  ? When you eat at a restaurant, ask for a box and place half of your meal in the box before you eat  ? Share an entrée with someone else   Replace high-calorie snacks with healthy, low-calorie snacks  ? Choose fresh fruit, vegetables, fat-free rice cakes, or air-popped popcorn instead of potato chips, nuts, or chocolate  ? Choose water or calorie-free drinks instead of soda or sweetened drinks   Do not shop for groceries when you are hungry  You may be more likely to make unhealthy food choices  Take a grocery list of healthy foods and shop after you have eaten   Eat regular meals  Do not skip meals  Skipping meals can lead to overeating later in the day  This can make it harder for you to lose weight  Eat a healthy snack in place of a meal if you do not have time to eat a regular meal  Talk with a dietitian to help you create a meal plan and schedule that is right for you  Other things to consider as you try to lose weight:    Be aware of situations that may give you the urge to overeat, such as eating while watching television  Find ways to avoid these situations  For example, read a book, go for a walk, or do crafts   Meet with a weight loss support group or friends who are also trying to lose weight  This may help you stay motivated to continue working on your weight loss goals  © Copyright Jannet Rim 2022 Information is for End User's use only and may not be sold, redistributed or otherwise used for commercial purposes    The above information is an  only  It is not intended as medical advice for individual conditions or treatments  Talk to your doctor, nurse or pharmacist before following any medical regimen to see if it is safe and effective for you  Cigarette Smoking and Your Health   AMBULATORY CARE:   Risks to your health if you smoke:  Nicotine and other chemicals found in tobacco and e-cigarettes can damage every cell in your body  Even if you are a light smoker, you have an increased risk for cancer, heart disease, and lung disease  If you are pregnant or have diabetes, smoking increases your risk for complications  Nicotine can affect an adolescent's developing brain  This can lead to trouble thinking, learning, or paying attention  Benefits to your health if you stop smoking:    You decrease respiratory symptoms such as coughing, wheezing, and shortness of breath   You reduce your risk for cancers of the lung, mouth, throat, kidney, bladder, pancreas, stomach, and cervix  If you already have cancer, you increase the benefits of chemotherapy  You also reduce your risk for cancer returning or a second cancer from developing   You reduce your risk for heart disease, blood clots, heart attack, and stroke   You reduce your risk for lung infections, and diseases such as pneumonia, asthma, chronic bronchitis, and emphysema   Your circulation improves  More oxygen can be delivered to your body  If you have diabetes, you lower your risk for complications, such as kidney, artery, and eye diseases  You also lower your risk for nerve damage  Nerve damage can lead to amputations, poor vision, and blindness   You improve your body's ability to heal and to fight infections   An adolescent can help his or her brain and body develop in a healthy way  Talk to your adolescent about all the health risks of nicotine  If you can, start talking about nicotine when your child is younger than 12 years   This may make it easier for him or her not to start using nicotine as a teenager or adult  Explain to him or her that it is best never to start  It can be hard to try to quit later  Benefits to the health of others if you stop smoking:  Tobacco is harmful to nonsmokers who breathe in your secondhand smoke  The following are ways the health of others around you may improve when you stop smoking:   You lower the risks for lung cancer and heart disease in nonsmoking adults   If you are pregnant, you lower the risk for miscarriage, early delivery, low birth weight, and stillbirth  You also lower your baby's risk for SIDS, obesity, developmental delay, and neurobehavioral problems, such as ADHD   If you have children, you lower their risk for ear infections, colds, pneumonia, bronchitis, and asthma  Follow up with your doctor as directed:  Write down your questions so you remember to ask them during your visits  For support and more information:    [employer]  [address] [place] [address],[address]  [address]  Phone: [facility] [place]  Phone: 3- 569 - 502-9373  Web Address: [place]     Smokefree  gov  Phone: 2- 561 - 244-3041  Web Address: www smokefree  gov  © Copyright Aristides Marsh 2022 Information is for End User's use only and may not be sold, redistributed or otherwise used for commercial purposes  The above information is an  only  It is not intended as medical advice for individual conditions or treatments  Talk to your doctor, nurse or pharmacist before following any medical regimen to see if it is safe and effective for you

## 2023-05-01 NOTE — PROGRESS NOTES
[facility] [address]     NAME: Anabella Dubon  AGE: 30 y o  SEX: female  : 1985     DATE: 2023     Assessment and Plan:     Problem List Items Addressed This Visit    None  Visit Diagnoses     Annual physical exam    -  Primary    Relevant Orders    HIV 1/2 AG/AB w Reflex [facility] for 2 yr old and above    RPR-Syphilis Screening (Total Syphilis IGG/IGM)    Chlamydia/GC amplified DNA by PCR    Hepatitis C antibody    HSV TYPE 1,2 DNA PCR    Encounter for immunization        Relevant Orders    Pneumococcal Conjugate Vaccine 20-valent (Pcv20)    BMI 25 0-25 9,adult        healthy diet and regular activity encouraged    Screening for STD (sexually transmitted disease)        Relevant Orders    HIV 1/2 AG/AB w Reflex [facility] for 2 yr old and above    RPR-Syphilis Screening (Total Syphilis IGG/IGM)    Chlamydia/GC amplified DNA by PCR    Hepatitis C antibody    HSV TYPE 1,2 DNA PCR    At risk for sexually transmitted disease due to unprotected sex        Encouraged safe sex practices, STI testing ordered upon request - call with any new exposures/STI symptoms    Relevant Orders    HIV 1/2 AG/AB w Reflex [facility] for 2 yr old and above    RPR-Syphilis Screening (Total Syphilis IGG/IGM)    Chlamydia/GC amplified DNA by PCR    Hepatitis C antibody    HSV TYPE 1,2 DNA PCR          Immunizations and preventive care screenings were discussed with patient today  Appropriate education was printed on patient's after visit summary  Counseling:  Alcohol/drug use: discussed moderation in alcohol intake, the recommendations for healthy alcohol use, and avoidance of illicit drug use  Dental Health: discussed importance of regular tooth brushing, flossing, and dental visits  Injury prevention: discussed safety/seat belts, safety helmets, smoke detectors, carbon dioxide detectors, and smoking near bedding or upholstery    · Exercise: the importance of regular exercise/physical activity was discussed  Recommend exercise 3-5 times per week for at least 30 minutes  · Cervical cancer screening: PAP 4/22    BMI Counseling: Body mass index is 25 58 kg/m²  The BMI is above normal  Nutrition recommendations include decreasing portion sizes, encouraging healthy choices of fruits and vegetables, consuming healthier snacks, moderation in carbohydrate intake, increasing intake of lean protein, reducing intake of saturated and trans fat and reducing intake of cholesterol  Exercise recommendations include exercising 3-5 times per week  Rationale for BMI follow-up plan is due to patient being overweight or obese  Tobacco Cessation Counseling: Tobacco cessation counseling was provided  The patient is sincerely urged to quit consumption of tobacco  She is not ready to quit tobacco  Patient refused medication  She states her smoking is minimal and not daily/frequently        Return in about 6 months (around 11/1/2023) for Recheck  Chief Complaint:     No chief complaint on file  History of Present Illness:     Adult Annual Physical   Patient here for a comprehensive physical exam  The patient reports no problems  She is interested in testing for STD's  She has a partner who has mult partners and she is trying to be safe  She admits she does not use condoms  She currently has 2 male partners and only uses condoms with one  Diet and Physical Activity  · Diet/Nutrition: well balanced diet and consuming 3-5 servings of fruits/vegetables daily  · Exercise: yoga, chair cardio and treadmill  · BMI reviewed - wgt down 7 lbs from June 22     Depression Screening  PHQ-2/9 Depression Screening         General Health  · Sleep: sleeps well  · Hearing: normal - bilateral   · Vision: no vision problems and most recent eye exam >1 year ago  · Dental: regular dental visits and brushes teeth twice daily         /GYN Health  · Last menstrual period: no regular menses with Mirena  · Contraceptive method: IUD placement  · History of STDs?: no      Review of Systems:     Review of Systems   Constitutional: Negative for chills and fever  HENT: Negative for congestion, hearing loss and trouble swallowing          + dry mouth   Eyes: Negative for pain and visual disturbance  Respiratory: Negative for cough and shortness of breath  Cardiovascular: Negative for chest pain and palpitations  Gastrointestinal: Positive for constipation  Negative for abdominal pain, blood in stool, diarrhea, nausea and vomiting  Genitourinary: Negative for difficulty urinating, dysuria, hematuria, vaginal bleeding, vaginal discharge and vaginal pain  Musculoskeletal: Positive for gait problem  Negative for back pain and neck pain  Skin: Negative for rash and wound  Neurological: Negative for dizziness, light-headedness and headaches  Hematological: Does not bruise/bleed easily  Psychiatric/Behavioral: Negative for confusion and dysphoric mood  Past Medical History:     Past Medical History:   Diagnosis Date    Anxiety     Chronic pain disorder     back - thoracic area    Depression     Foot drop     Bilateral    History of spinal cord injury     at L1    Mild acid reflux     Small bowel problem     Chronic Constipation    Urge incontinence     Urinary incontinence May 30 2013    Use of cane as ambulatory aid     nguyen leg braces      Past Surgical History:     Past Surgical History:   Procedure Laterality Date    BOTOX INJECTION N/A 7/29/2019    Procedure: INJECTION BOTULINUM TOXIN (BOTOX); Surgeon: Radha Desir MD;  Location:  MAIN OR;  Service: Urology    BOTOX INJECTION N/A 4/13/2021    Procedure: INJECTION BOTULINUM TOXIN (BOTOX), cystoscopy;  Surgeon: Susana Machado MD;  [facility];  Service: Urology    BOTOX INJECTION N/A 2/7/2022    Procedure: CYSTOSCOPY, INJECTION BOTULINUM TOXIN (BOTOX);   Surgeon: Susana Machado MD;  Location:  MAIN OR;  Service: Urology    COLONOSCOPY      CYSTOSCOPY      Diagnostic; onset: 1/3/17    FL MYELOGRAM 2 OR MORE REGIONS  9/6/2018    SD COLONOSCOPY FLX DX W/COLLJ SPEC WHEN PFRMD N/A 12/12/2017    Procedure: COLONOSCOPY;  Surgeon: Serene Winters MD;  Location: [facility]; Service: Gastroenterology    SD CYSTOURETHROSCOPY N/A 7/29/2019    Procedure: Dalila Said;  Surgeon: Arnold Tanner MD;  Location:  MAIN OR;  Service: Urology    SPINAL FUSION  2013    T12-L2      Social History:     Social History     Socioeconomic History    Marital status: Single     Spouse name: None    Number of children: None    Years of education: None    Highest education level: None   Occupational History    Occupation: disability   Tobacco Use    Smoking status: Every Day     Packs/day: 0 70     Years: 1 00     Pack years: 0 70     Types: Cigarettes     Start date: 2012    Smokeless tobacco: Never   Vaping Use    Vaping Use: Never used   Substance and Sexual Activity    Alcohol use: Yes     Comment: liquor    Drug use: Not Currently     Types: Marijuana    Sexual activity: Yes     Partners: Male     Birth control/protection: Coitus interruptus, I U D     Other Topics Concern    None   Social History Narrative    None     Social Determinants of Health     Financial Resource Strain: Not on file   Food Insecurity: Not on file   Transportation Needs: Not on file   Physical Activity: Not on file   Stress: Not on file   Social Connections: Not on file   Intimate Partner Violence: Not on file   Housing Stability: Not on file      Family History:     Family History   Problem Relation Age of Onset    Depression Mother     Diabetes Father     Lymphoma Father         Waldenstrom's    Heart disease Father         cardiac disorder    Hypertension Father     Pulmonary embolism Father     Hyperlipidemia Father     Cancer Father     Heart attack Father     Clotting disorder Father     No Known Problems Sister    Radha Petersen "No Known Problems Brother     Stroke Maternal Grandmother     Heart disease Paternal Marion Graham     Stroke Family     Colon polyps Neg Hx     Colon cancer Neg Hx       Current Medications:     Current Outpatient Medications   Medication Sig Dispense Refill    albuterol (ProAir HFA) 90 mcg/act inhaler Inhale 2 puffs every 6 (six) hours as needed for wheezing or shortness of breath 18 g 1    amphetamine-dextroamphetamine (ADDERALL XR) 15 MG 24 hr capsule Take 15 mg by mouth daily in the early morning      carBAMazepine (TEGretol XR) 100 mg 12 hr tablet Take 100 mg by mouth in the morning      famotidine (PEPCID) 40 MG tablet Take 1 tablet (40 mg total) by mouth daily 30 tablet 5    gabapentin (NEURONTIN) 300 mg capsule Take 1 capsule by mouth 3 (three) times a day      hydrOXYzine HCL (ATARAX) 10 mg tablet As needed for anxiety      levonorgestrel (MIRENA) 20 MCG/24HR IUD 20 mcg/day      sertraline (ZOLOFT) 100 mg tablet TAKE ONE TABLET DAILY AM WITH 25MG (TOTAL = 125MG)   sertraline (ZOLOFT) 25 mg tablet 25 mg every morning      cyclobenzaprine (FLEXERIL) 5 mg tablet Take 1 tablet (5 mg total) by mouth 3 (three) times a day as needed for muscle spasms (Patient not taking: Reported on 5/1/2023) 30 tablet 0     Current Facility-Administered Medications   Medication Dose Route Frequency Provider Last Rate Last Admin    Botulinum Toxin Type A SOLR 200 Units  200 Units Injection Once Bisi Rodrigues MD        Botulinum Toxin Type A SOLR 200 Units  200 Units Injection Once Bisi Rodrigues MD          Allergies: Allergies   Allergen Reactions    Sulfamethoxazole Nausea Only and GI Intolerance      Physical Exam:     /80 (Patient Position: Sitting, Cuff Size: Standard)   Pulse 83   Temp 98 4 °F (36 9 °C) (Tympanic)   Ht 5' 4\" (1 626 m)   Wt 67 6 kg (149 lb)   SpO2 98%   BMI 25 58 kg/m²     Physical Exam  Vitals and nursing note reviewed     Constitutional:       General: She is not in acute " distress  Appearance: She is well-developed  She is not ill-appearing  HENT:      Head: Normocephalic and atraumatic  Right Ear: Tympanic membrane and external ear normal       Left Ear: Tympanic membrane and external ear normal       Mouth/Throat:      Mouth: Mucous membranes are moist       Pharynx: Oropharynx is clear  No oropharyngeal exudate  Eyes:      General:         Right eye: No discharge  Left eye: No discharge  Conjunctiva/sclera: Conjunctivae normal    Neck:      Thyroid: No thyromegaly  Trachea: No tracheal deviation  Cardiovascular:      Rate and Rhythm: Normal rate and regular rhythm  Heart sounds: Normal heart sounds  No murmur heard  Pulmonary:      Effort: Pulmonary effort is normal  No respiratory distress  Breath sounds: Normal breath sounds  No wheezing, rhonchi or rales  Abdominal:      General: There is no distension  Palpations: Abdomen is soft  Tenderness: There is no abdominal tenderness  There is no guarding or rebound  Musculoskeletal:         General: No tenderness or signs of injury  Cervical back: Neck supple  Right lower leg: No edema  Left lower leg: No edema  Comments: B/L  LE braces   Lymphadenopathy:      Cervical: No cervical adenopathy  Skin:     General: Skin is warm and dry  Findings: No rash  Neurological:      Mental Status: She is alert  Mental status is at baseline  Motor: No abnormal muscle tone  Gait: Gait abnormal       Comments: Ambulates with ataxic gait with cane   Psychiatric:         Mood and Affect: Mood normal          Behavior: Behavior normal          Thought Content:  Thought content normal          Judgment: Judgment normal           DO Gregg Melissa [phone]

## 2023-05-04 ENCOUNTER — APPOINTMENT (OUTPATIENT)
Dept: LAB | Facility: HOSPITAL | Age: 38
End: 2023-05-04

## 2023-05-04 DIAGNOSIS — Z11.3 SCREENING FOR STD (SEXUALLY TRANSMITTED DISEASE): ICD-10-CM

## 2023-05-04 DIAGNOSIS — Z00.00 ANNUAL PHYSICAL EXAM: ICD-10-CM

## 2023-05-04 DIAGNOSIS — Z91.89 AT RISK FOR SEXUALLY TRANSMITTED DISEASE DUE TO UNPROTECTED SEX: ICD-10-CM

## 2023-05-04 LAB
HCV AB SER QL: NORMAL
HIV 1+2 AB+HIV1 P24 AG SERPL QL IA: NORMAL
HIV 2 AB SERPL QL IA: NORMAL
HIV1 AB SERPL QL IA: NORMAL
HIV1 P24 AG SERPL QL IA: NORMAL
TREPONEMA PALLIDUM IGG+IGM AB [PRESENCE] IN SERUM OR PLASMA BY IMMUNOASSAY: NORMAL

## 2023-05-05 LAB
C TRACH DNA SPEC QL NAA+PROBE: NEGATIVE
N GONORRHOEA DNA SPEC QL NAA+PROBE: NEGATIVE

## 2023-05-08 LAB
HSV1 DNA SPEC QL NAA+PROBE: NEGATIVE
HSV2 DNA SPEC QL NAA+PROBE: NEGATIVE

## 2023-05-12 ENCOUNTER — OFFICE VISIT (OUTPATIENT)
Dept: FAMILY MEDICINE CLINIC | Facility: HOSPITAL | Age: 38
End: 2023-05-12

## 2023-05-12 ENCOUNTER — APPOINTMENT (OUTPATIENT)
Dept: RADIOLOGY | Facility: HOSPITAL | Age: 38
End: 2023-05-12

## 2023-05-12 ENCOUNTER — HOSPITAL ENCOUNTER (OUTPATIENT)
Dept: RADIOLOGY | Facility: HOSPITAL | Age: 38
Discharge: HOME | End: 2023-05-12

## 2023-05-12 VITALS
BODY MASS INDEX: 25.47 KG/M2 | SYSTOLIC BLOOD PRESSURE: 112 MMHG | WEIGHT: 149.2 LBS | HEART RATE: 80 BPM | TEMPERATURE: 98.9 F | DIASTOLIC BLOOD PRESSURE: 78 MMHG | HEIGHT: 64 IN

## 2023-05-12 DIAGNOSIS — Z87.828 H/O SPINAL CORD INJURY: ICD-10-CM
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
## 2023-05-12 DIAGNOSIS — M53.3 COCCYXDYNIA: ICD-10-CM

## 2023-05-12 RX ORDER — MELOXICAM 15 MG/1
15 TABLET ORAL DAILY
Qty: 30 TABLET | Refills: 0 | Status: SHIPPED | OUTPATIENT
Start: 2023-05-12

## 2023-05-12 NOTE — PROGRESS NOTES
Name: Sharita Strong      : 1985      MRN: [id number]  Encounter Provider: EUSEBIA Sanchez  Encounter Date: 2023   Encounter department: [facility]      1  Fall, initial encounter  Comments:  1 week ago, off counter stool (about 3 feet high)    2  Coccyxdynia  Comments:  eval further w/x-ray (given onset s/p fall & hx of SCI), take meloxicam as rx'd & apply cold/ice compresses; call w/worse or persistent sx's  Orders:  -     meloxicam (MOBIC) 15 mg tablet; Take 1 tablet (15 mg total) by mouth daily  -     XR sacrum and coccyx; Future; Expected date: 2023    3  H/O spinal cord injury           Subjective      States she fell off a high stool last week and landed flat on her butt  Did not have pain immediately but has gotten worse since  Is unable to sit comfortably flat on buttocks  Has been trying to sit on an angle off of area  States she took a high dose of tylenol one time w/o relief  Legs feel burning R>L  Hx of spinal cord injury - normally walks w/a cane  Review of Systems   Musculoskeletal: Positive for back pain and gait problem  Current Outpatient Medications on File Prior to Visit   Medication Sig   • albuterol (ProAir HFA) 90 mcg/act inhaler Inhale 2 puffs every 6 (six) hours as needed for wheezing or shortness of breath   • amphetamine-dextroamphetamine (ADDERALL XR) 15 MG 24 hr capsule Take 15 mg by mouth daily in the early morning   • carBAMazepine (TEGretol XR) 100 mg 12 hr tablet Take 100 mg by mouth in the morning   • famotidine (PEPCID) 40 MG tablet Take 1 tablet (40 mg total) by mouth daily   • gabapentin (NEURONTIN) 300 mg capsule Take 1 capsule by mouth 3 (three) times a day   • hydrOXYzine HCL (ATARAX) 10 mg tablet As needed for anxiety   • levonorgestrel (MIRENA) 20 MCG/24HR IUD 20 mcg/day   • sertraline (ZOLOFT) 100 mg tablet TAKE ONE TABLET DAILY AM WITH 25MG (TOTAL = 125MG)     • sertraline (ZOLOFT) "25 mg tablet 25 mg every morning   • cyclobenzaprine (FLEXERIL) 5 mg tablet Take 1 tablet (5 mg total) by mouth 3 (three) times a day as needed for muscle spasms (Patient not taking: Reported on 5/1/2023)       Objective     /78   Pulse 80   Temp 98 9 °F (37 2 °C)   Ht 5' 4\" (1 626 m)   Wt 67 7 kg (149 lb 3 2 oz)   BMI 25 61 kg/m²       Physical Exam  Vitals reviewed  Constitutional:       General: She is not in acute distress  Appearance: Normal appearance  HENT:      Head: Normocephalic  Pulmonary:      Effort: Pulmonary effort is normal  No respiratory distress  Musculoskeletal:      Lumbar back: Tenderness present  Decreased range of motion  Back:    Skin:     General: Skin is warm and dry  Neurological:      General: No focal deficit present  Mental Status: She is alert and oriented to person, place, and time  Gait: Gait abnormal (limping w/cane (chronic, no worse))     Psychiatric:         Mood and Affect: Mood normal          Behavior: Behavior normal           EUSEBIA Monzon  "

## 2023-05-15 ENCOUNTER — TELEPHONE (OUTPATIENT)
Dept: FAMILY MEDICINE CLINIC | Facility: HOSPITAL | Age: 38
End: 2023-05-15

## 2023-05-15 DIAGNOSIS — S32.2XXA CLOSED FRACTURE OF COCCYX, INITIAL ENCOUNTER (HCC): Primary | ICD-10-CM

## 2023-06-07 ENCOUNTER — OFFICE VISIT (OUTPATIENT)
Dept: UROLOGY | Facility: MEDICAL CENTER | Age: 38
End: 2023-06-07
Payer: COMMERCIAL

## 2023-06-07 VITALS
HEIGHT: 64 IN | OXYGEN SATURATION: 99 % | WEIGHT: 145.2 LBS | DIASTOLIC BLOOD PRESSURE: 82 MMHG | HEART RATE: 77 BPM | SYSTOLIC BLOOD PRESSURE: 122 MMHG | BODY MASS INDEX: 24.79 KG/M2

## 2023-06-07 DIAGNOSIS — N39.41 URGE INCONTINENCE: Primary | ICD-10-CM

## 2023-06-07 LAB
POST-VOID RESIDUAL VOLUME, ML POC: 0 ML
SL AMB  POCT GLUCOSE, UA: NORMAL
SL AMB LEUKOCYTE ESTERASE,UA: NORMAL
SL AMB POCT BILIRUBIN,UA: NORMAL
SL AMB POCT BLOOD,UA: NORMAL
SL AMB POCT CLARITY,UA: CLEAR
SL AMB POCT COLOR,UA: YELLOW
SL AMB POCT KETONES,UA: NORMAL
SL AMB POCT NITRITE,UA: NORMAL
SL AMB POCT PH,UA: 7
SL AMB POCT SPECIFIC GRAVITY,UA: 1.01
SL AMB POCT URINE PROTEIN: NORMAL
SL AMB POCT UROBILINOGEN: 0.2

## 2023-06-07 PROCEDURE — 51798 US URINE CAPACITY MEASURE: CPT | Performed by: UROLOGY

## 2023-06-07 PROCEDURE — 81003 URINALYSIS AUTO W/O SCOPE: CPT | Performed by: UROLOGY

## 2023-06-07 PROCEDURE — 99214 OFFICE O/P EST MOD 30 MIN: CPT | Performed by: UROLOGY

## 2023-06-07 NOTE — PROGRESS NOTES
"   HISTORY:    Follow-up urgency incontinence related to  Neurogenic bladder  Symptoms urgency, urge incontinence, wears many pads per day  No infections    Last Botox was February 2022, worked quite well for 8 to 12 months  Emptying well with minimal PVR today, culture taken       ASSESSMENT / PLAN:    Schedule Botox    The following portions of the patient's history were reviewed and updated as appropriate: allergies, current medications, past family history, past medical history, past social history, past surgical history and problem list     Review of Systems   All other systems reviewed and are negative  Objective:     Physical Exam  Constitutional:       Appearance: Normal appearance  Neurological:      Mental Status: She is alert  No results found for: \"PSA\"]  BUN   Date Value Ref Range Status   01/09/2023 14 5 - 25 mg/dL Final   03/06/2018 12 7 - 25 mg/dL Final     Creatinine   Date Value Ref Range Status   01/09/2023 0 62 0 60 - 1 30 mg/dL Final     Comment:     Standardized to IDMS reference method   06/21/2017 0 62 0 50 - 1 10 mg/dL Final     No components found for: \"CBC\"      Patient Active Problem List   Diagnosis   • Paronychia of toe of right foot   • Constipation   • H/O spinal cord injury   • Chronic bilateral thoracic back pain   • Low vitamin B12 level   • Vegetarian diet   • Injury to L1 level of spinal cord (HCC)   • Bipolar affective disorder (HCC)   • Lumbar radiculopathy   • Myofascial pain   • Urinary urgency   • Night sweats   • Snoring   • Urge incontinence   • Neurogenic bladder   • Nausea   • Heartburn   • Throat clearing   • Drop foot gait   • ADHD        Diagnoses and all orders for this visit:    Urge incontinence  -     POCT urine dip auto non-scope  -     Urine culture  -     POCT Measure PVR           Patient ID: Sheri Truong is a 30 y o  female        Current Outpatient Medications:   •  albuterol (ProAir HFA) 90 mcg/act inhaler, Inhale 2 puffs every 6 " (six) hours as needed for wheezing or shortness of breath, Disp: 18 g, Rfl: 1  •  amphetamine-dextroamphetamine (ADDERALL XR) 15 MG 24 hr capsule, Take 15 mg by mouth daily in the early morning, Disp: , Rfl:   •  carBAMazepine (TEGretol XR) 100 mg 12 hr tablet, Take 100 mg by mouth in the morning, Disp: , Rfl:   •  cyclobenzaprine (FLEXERIL) 5 mg tablet, Take 1 tablet (5 mg total) by mouth 3 (three) times a day as needed for muscle spasms, Disp: 30 tablet, Rfl: 0  •  famotidine (PEPCID) 40 MG tablet, Take 1 tablet (40 mg total) by mouth daily, Disp: 30 tablet, Rfl: 5  •  gabapentin (NEURONTIN) 300 mg capsule, Take 1 capsule by mouth 3 (three) times a day 100mg, Disp: , Rfl:   •  hydrOXYzine HCL (ATARAX) 10 mg tablet, As needed for anxiety, Disp: , Rfl:   •  levonorgestrel (MIRENA) 20 MCG/24HR IUD, 20 mcg/day, Disp: , Rfl:   •  meloxicam (MOBIC) 15 mg tablet, Take 1 tablet (15 mg total) by mouth daily, Disp: 30 tablet, Rfl: 0  •  sertraline (ZOLOFT) 100 mg tablet, TAKE ONE TABLET DAILY AM WITH 25MG (TOTAL = 125MG)  , Disp: , Rfl:   •  sertraline (ZOLOFT) 25 mg tablet, 25 mg every morning (Patient not taking: Reported on 6/7/2023), Disp: , Rfl:     Current Facility-Administered Medications:   •  Botulinum Toxin Type A SOLR 200 Units, 200 Units, Injection, Once, Lula Yepez MD  •  Botulinum Toxin Type A SOLR 200 Units, 200 Units, Injection, Once, Lula Yepez MD    Past Medical History:   Diagnosis Date   • Anxiety    • Chronic pain disorder     back - thoracic area   • Closed fracture of distal end of ulna (alone)    • Depression    • Foot drop     Bilateral   • GERD (gastroesophageal reflux disease)    • History of spinal cord injury     at L1   • Mild acid reflux    • Small bowel problem     Chronic Constipation   • Urge incontinence    • Urinary incontinence 05/30/2013   • Use of cane as ambulatory aid     nguyen leg braces       Past Surgical History:   Procedure Laterality Date   • BOTOX INJECTION N/A 7/29/2019 Procedure: INJECTION BOTULINUM TOXIN (BOTOX); Surgeon: Yudith Coronado MD;  Location[facility];  Service: Urology   • BOTOX INJECTION N/A 4/13/2021    Procedure: INJECTION BOTULINUM TOXIN (BOTOX), cystoscopy;  Surgeon: Verlene Pallas, MD;  [facility];  Service: Urology   • BOTOX INJECTION N/A 2/7/2022    Procedure: CYSTOSCOPY, INJECTION BOTULINUM TOXIN (BOTOX); Surgeon: Verlene Pallas, MD;  [facility];  Service: Urology   • COLONOSCOPY     • CYSTOSCOPY      Diagnostic; onset: 1/3/17   • FL MYELOGRAM 2 OR MORE REGIONS  9/6/2018   • [place] COLONOSCOPY FLX DX W/COLLJ SPEC WHEN PFRMD N/A 12/12/2017    Procedure: COLONOSCOPY;  Surgeon: Eugene Law MD;  Location: [facility];   Service: Gastroenterology   • MN CYSTOURETHROSCOPY N/A 7/29/2019    Procedure: CYSTOSCOPY;  Surgeon: Yudith Coronado MD;  [facility];  Service: Urology   • SPINAL FUSION  2013    T12-L2       Social History

## 2023-06-20 ENCOUNTER — TELEPHONE (OUTPATIENT)
Dept: UROLOGY | Facility: MEDICAL CENTER | Age: 38
End: 2023-06-20

## 2023-06-20 NOTE — TELEPHONE ENCOUNTER
I spoke to the patient and scheduled her procedure for 7/25/2023 at [facility] with Dr Molly Liu     -instructions given verbally and Emailed  -patient aware to be NPO, needs a  -CBC, CMP, Urine C&S  2 weeks prior  -PO

## 2023-06-27 ENCOUNTER — APPOINTMENT (OUTPATIENT)
Dept: LAB | Facility: HOSPITAL | Age: 38
End: 2023-06-27
Payer: COMMERCIAL

## 2023-06-27 DIAGNOSIS — Z79.899 ENCOUNTER FOR LONG-TERM (CURRENT) USE OF OTHER MEDICATIONS: ICD-10-CM

## 2023-06-27 LAB
BASOPHILS # BLD AUTO: 0.03 THOUSANDS/ÂΜL (ref 0–0.1)
BASOPHILS NFR BLD AUTO: 1 % (ref 0–1)
CARBAMAZEPINE SERPL-MCNC: 3.6 UG/ML (ref 4–12)
EOSINOPHIL # BLD AUTO: 0.11 THOUSAND/ÂΜL (ref 0–0.61)
EOSINOPHIL NFR BLD AUTO: 2 % (ref 0–6)
ERYTHROCYTE [DISTWIDTH] IN BLOOD BY AUTOMATED COUNT: 12.1 % (ref 11.6–15.1)
HCT VFR BLD AUTO: 42.1 % (ref 34.8–46.1)
HGB BLD-MCNC: 14.1 G/DL (ref 11.5–15.4)
IMM GRANULOCYTES # BLD AUTO: 0.02 THOUSAND/UL (ref 0–0.2)
IMM GRANULOCYTES NFR BLD AUTO: 0 % (ref 0–2)
LYMPHOCYTES # BLD AUTO: 1.66 THOUSANDS/ÂΜL (ref 0.6–4.47)
LYMPHOCYTES NFR BLD AUTO: 35 % (ref 14–44)
MCH RBC QN AUTO: 31.9 PG (ref 26.8–34.3)
MCHC RBC AUTO-ENTMCNC: 33.5 G/DL (ref 31.4–37.4)
MCV RBC AUTO: 95 FL (ref 82–98)
MONOCYTES # BLD AUTO: 0.41 THOUSAND/ÂΜL (ref 0.17–1.22)
MONOCYTES NFR BLD AUTO: 9 % (ref 4–12)
NEUTROPHILS # BLD AUTO: 2.47 THOUSANDS/ÂΜL (ref 1.85–7.62)
NEUTS SEG NFR BLD AUTO: 53 % (ref 43–75)
NRBC BLD AUTO-RTO: 0 /100 WBCS
PLATELET # BLD AUTO: 219 THOUSANDS/UL (ref 149–390)
PMV BLD AUTO: 10 FL (ref 8.9–12.7)
RBC # BLD AUTO: 4.42 MILLION/UL (ref 3.81–5.12)
WBC # BLD AUTO: 4.7 THOUSAND/UL (ref 4.31–10.16)

## 2023-06-27 PROCEDURE — 85025 COMPLETE CBC W/AUTO DIFF WBC: CPT

## 2023-06-27 PROCEDURE — 36415 COLL VENOUS BLD VENIPUNCTURE: CPT

## 2023-06-27 PROCEDURE — 80156 ASSAY CARBAMAZEPINE TOTAL: CPT

## 2023-07-13 ENCOUNTER — OFFICE VISIT (OUTPATIENT)
Dept: OBGYN CLINIC | Facility: MEDICAL CENTER | Age: 38
End: 2023-07-13
Payer: COMMERCIAL

## 2023-07-13 ENCOUNTER — APPOINTMENT (OUTPATIENT)
Dept: RADIOLOGY | Facility: MEDICAL CENTER | Age: 38
End: 2023-07-13
Payer: COMMERCIAL

## 2023-07-13 ENCOUNTER — APPOINTMENT (OUTPATIENT)
Dept: LAB | Facility: HOSPITAL | Age: 38
End: 2023-07-13
Payer: COMMERCIAL

## 2023-07-13 VITALS
SYSTOLIC BLOOD PRESSURE: 122 MMHG | WEIGHT: 145 LBS | HEART RATE: 80 BPM | BODY MASS INDEX: 24.75 KG/M2 | DIASTOLIC BLOOD PRESSURE: 80 MMHG | HEIGHT: 64 IN

## 2023-07-13 DIAGNOSIS — S32.2XXA CLOSED FRACTURE OF COCCYX, INITIAL ENCOUNTER (HCC): Primary | ICD-10-CM

## 2023-07-13 DIAGNOSIS — M54.50 ACUTE MIDLINE LOW BACK PAIN WITHOUT SCIATICA: ICD-10-CM

## 2023-07-13 DIAGNOSIS — Z98.890 HISTORY OF LUMBAR SURGERY: ICD-10-CM

## 2023-07-13 DIAGNOSIS — N39.41 URGE INCONTINENCE: ICD-10-CM

## 2023-07-13 DIAGNOSIS — S32.2XXA CLOSED FRACTURE OF COCCYX, INITIAL ENCOUNTER (HCC): ICD-10-CM

## 2023-07-13 PROCEDURE — 99204 OFFICE O/P NEW MOD 45 MIN: CPT | Performed by: EMERGENCY MEDICINE

## 2023-07-13 PROCEDURE — 85025 COMPLETE CBC W/AUTO DIFF WBC: CPT

## 2023-07-13 PROCEDURE — 80053 COMPREHEN METABOLIC PANEL: CPT

## 2023-07-13 PROCEDURE — 72100 X-RAY EXAM L-S SPINE 2/3 VWS: CPT

## 2023-07-13 PROCEDURE — 36415 COLL VENOUS BLD VENIPUNCTURE: CPT

## 2023-07-13 NOTE — PROGRESS NOTES
Assessment/Plan:    Diagnoses and all orders for this visit:    Closed fracture of coccyx, initial encounter [facility])  -     Ambulatory Referral to Orthopedic Surgery  -     XR spine lumbar 2 or 3 views injury; Future  -     Ambulatory Referral to Physical Therapy; Future    Acute midline low back pain without sciatica  -     Ambulatory Referral to Physical Therapy; Future    History of lumbar surgery  -     XR spine lumbar 2 or 3 views injury; Future  -     Ambulatory Referral to Physical Therapy; Future    Patient does have a significant history of prior spinal cord injury and surgery. X-rays today of the lumbar spine do show that there are fractures of the superior screws however this is seen on the CAT scan from 2018 and the patient is aware. She is not able to f/u with her spine surgeon given insurance issues. However she does not have any worsening or new neurological signs/symptoms. Recommend restarting Mobic and taking it more consistently, continued donut pillow and start PT  T/c Medrol dose pack    Return in about 4 weeks (around 8/10/2023). Subjective:   Patient ID: Gwdara Buchanan is a 30 y.o. female. NP with hx spinal cord injury and surgery presents for lower midline back and tailbone pain with radiation into the buttocks and lateral thigh occurring after a fall off of the high stool in May she was evaluated with x-rays of the sacrum and coccyx which showed a mildly displaced fracture of the coccyx. She has been using a donut pillow and was prescribed meloxicam which she took a few times. She does have residual neurological deficits from her prior injury      Review of Systems    The following portions of the patient's chart were reviewed and updated as appropriate:    Allergy:    Allergies   Allergen Reactions   • Sulfamethoxazole Nausea Only and GI Intolerance       Medications:    Current Outpatient Medications:   •  albuterol (ProAir HFA) 90 mcg/act inhaler, Inhale 2 puffs every 6 (six) hours as needed for wheezing or shortness of breath, Disp: 18 g, Rfl: 1  •  amphetamine-dextroamphetamine (ADDERALL XR) 15 MG 24 hr capsule, Take 15 mg by mouth daily in the early morning, Disp: , Rfl:   •  carBAMazepine (TEGretol XR) 100 mg 12 hr tablet, Take 100 mg by mouth in the morning, Disp: , Rfl:   •  cyclobenzaprine (FLEXERIL) 5 mg tablet, Take 1 tablet (5 mg total) by mouth 3 (three) times a day as needed for muscle spasms, Disp: 30 tablet, Rfl: 0  •  famotidine (PEPCID) 40 MG tablet, Take 1 tablet (40 mg total) by mouth daily, Disp: 30 tablet, Rfl: 5  •  gabapentin (NEURONTIN) 300 mg capsule, Take 1 capsule by mouth 3 (three) times a day 100mg, Disp: , Rfl:   •  hydrOXYzine HCL (ATARAX) 10 mg tablet, As needed for anxiety, Disp: , Rfl:   •  levonorgestrel (MIRENA) 20 MCG/24HR IUD, 20 mcg/day, Disp: , Rfl:   •  meloxicam (MOBIC) 15 mg tablet, Take 1 tablet (15 mg total) by mouth daily, Disp: 30 tablet, Rfl: 0  •  sertraline (ZOLOFT) 100 mg tablet, TAKE ONE TABLET DAILY AM WITH 25MG (TOTAL = 125MG). , Disp: , Rfl:   •  sertraline (ZOLOFT) 25 mg tablet, 25 mg every morning (Patient not taking: Reported on 6/7/2023), Disp: , Rfl:     Current Facility-Administered Medications:   •  Botulinum Toxin Type A SOLR 200 Units, 200 Units, Injection, Once, Juan Jordan MD  •  Botulinum Toxin Type A SOLR 200 Units, 200 Units, Injection, Once, Juan Jordan MD    Patient Active Problem List   Diagnosis   • Paronychia of toe of right foot   • Constipation   • H/O spinal cord injury   • Chronic bilateral thoracic back pain   • Low vitamin B12 level   • Vegetarian diet   • Injury to L1 level of spinal cord (HCC)   • Bipolar affective disorder (HCC)   • Lumbar radiculopathy   • Myofascial pain   • Urinary urgency   • Night sweats   • Snoring   • Urge incontinence   • Neurogenic bladder   • Nausea   • Heartburn   • Throat clearing   • Drop foot gait   • ADHD       Objective:  /80   Pulse 80   Ht 5' 4" (1.626 m) Wt 65.8 kg (145 lb)   BMI 24.89 kg/m²     Right Ankle Exam     Comments:  B/L FAO for b/l foot drops            Physical Exam      Neurologic Exam    Procedures    I have personally reviewed pertinent films in PACS and my interpretation is Xrays L spine reveal superior screw fractures stable compared to CT L spine from 2018. No new acute fractures or dislocations            Past Medical History:   Diagnosis Date   • Anxiety    • Chronic pain disorder     back - thoracic area   • Closed fracture of distal end of ulna (alone)    • Depression    • Foot drop     Bilateral   • GERD (gastroesophageal reflux disease)    • History of spinal cord injury     at L1   • Mild acid reflux    • Small bowel problem     Chronic Constipation   • Urge incontinence    • Urinary incontinence 05/30/2013   • Use of cane as ambulatory aid     nguyen leg braces       Past Surgical History:   Procedure Laterality Date   • BOTOX INJECTION N/A 7/29/2019    Procedure: INJECTION BOTULINUM TOXIN (BOTOX); Surgeon: Chio Singletary MD;  [facility];  Service: Urology   • BOTOX INJECTION N/A 4/13/2021    Procedure: INJECTION BOTULINUM TOXIN (BOTOX), cystoscopy;  Surgeon: Lisbeth Brice MD;  [facility];  Service: Urology   • BOTOX INJECTION N/A 2/7/2022    Procedure: CYSTOSCOPY, INJECTION BOTULINUM TOXIN (BOTOX); Surgeon: Lisbeth Brice MD;  [facility];  Service: Urology   • COLONOSCOPY     • CYSTOSCOPY      Diagnostic; onset: 1/3/17   • FL MYELOGRAM 2 OR MORE REGIONS  9/6/2018   • AR COLONOSCOPY FLX DX W/COLLJ SPEC WHEN PFRMD N/A 12/12/2017    Procedure: COLONOSCOPY;  Surgeon: Blayne Cisneros MD;  [facility];   Service: Gastroenterology   • AR CYSTOURETHROSCOPY N/A 7/29/2019    Procedure: CYSTOSCOPY;  Surgeon: Chio Singletary MD;  [facility];  Service: Urology   • SPINAL FUSION  2013    T12-L2       Social History     Socioeconomic History   • Marital status: Single     Spouse name: Not on file   • Number of children: Not on file   • Years of education: Not on file   • Highest education level: Not on file   Occupational History   • Occupation: disability   Tobacco Use   • Smoking status: Former     Packs/day: 0.70     Years: 1.00     Total pack years: 0.70     Types: Cigarettes     Start date:      Quit date: 2016     Years since quittin.5   • Smokeless tobacco: Never   Vaping Use   • Vaping Use: Never used   Substance and Sexual Activity   • Alcohol use: Yes     Alcohol/week: 4.0 standard drinks of alcohol     Types: 4 Standard drinks or equivalent per week     Comment: liquor   • Drug use: Yes     Types: Marijuana   • Sexual activity: Not Currently     Partners: Female, Male     Birth control/protection: I.U.D.    Other Topics Concern   • Not on file   Social History Narrative   • Not on file     Social Determinants of Health     Financial Resource Strain: Not on file   Food Insecurity: Not on file   Transportation Needs: Not on file   Physical Activity: Not on file   Stress: Not on file   Social Connections: Not on file   Intimate Partner Violence: Not on file   Housing Stability: Not on file       Family History   Problem Relation Age of Onset   • Depression Mother    • Anxiety disorder Mother    • Diabetes Father    • Lymphoma Father         Waldenstrom's   • Heart disease Father         cardiac disorder   • Hypertension Father    • Pulmonary embolism Father    • Hyperlipidemia Father    • Cancer Father    • Heart attack Father    • Clotting disorder Father    • ADD / ADHD Father    • No Known Problems Sister    • No Known Problems Brother    • Stroke Maternal Grandmother    • Heart disease Paternal Grandfather    • Stroke Family    • Colon polyps Neg Hx    • Colon cancer Neg Hx

## 2023-07-13 NOTE — PATIENT INSTRUCTIONS
While taking Mobic (meloxicam) do not take any other NSAIDs such as Advil, Motrin, ibuprofen, Celebrex, naproxen or Aleve. However you may take Tylenol (acetaminophen).   You may also take Tylenol 500mg every 4-6 hours as needed OR max 1,000mg per dose up to 3 times per day for a total of 3,000mg per day

## 2023-07-14 LAB
ALBUMIN SERPL BCP-MCNC: 4 G/DL (ref 3.5–5)
ALP SERPL-CCNC: 69 U/L (ref 46–116)
ALT SERPL W P-5'-P-CCNC: 21 U/L (ref 12–78)
ANION GAP SERPL CALCULATED.3IONS-SCNC: 4 MMOL/L
AST SERPL W P-5'-P-CCNC: 23 U/L (ref 5–45)
BASOPHILS # BLD AUTO: 0.04 THOUSANDS/ÂΜL (ref 0–0.1)
BASOPHILS NFR BLD AUTO: 1 % (ref 0–1)
BILIRUB SERPL-MCNC: 0.28 MG/DL (ref 0.2–1)
BUN SERPL-MCNC: 11 MG/DL (ref 5–25)
CALCIUM SERPL-MCNC: 9 MG/DL (ref 8.3–10.1)
CHLORIDE SERPL-SCNC: 108 MMOL/L (ref 96–108)
CO2 SERPL-SCNC: 29 MMOL/L (ref 21–32)
CREAT SERPL-MCNC: 0.73 MG/DL (ref 0.6–1.3)
EOSINOPHIL # BLD AUTO: 0.08 THOUSAND/ÂΜL (ref 0–0.61)
EOSINOPHIL NFR BLD AUTO: 2 % (ref 0–6)
ERYTHROCYTE [DISTWIDTH] IN BLOOD BY AUTOMATED COUNT: 12.1 % (ref 11.6–15.1)
GFR SERPL CREATININE-BSD FRML MDRD: 107 ML/MIN/1.73SQ M
GLUCOSE SERPL-MCNC: 79 MG/DL (ref 65–140)
HCT VFR BLD AUTO: 44.6 % (ref 34.8–46.1)
HGB BLD-MCNC: 15.1 G/DL (ref 11.5–15.4)
IMM GRANULOCYTES # BLD AUTO: 0.02 THOUSAND/UL (ref 0–0.2)
IMM GRANULOCYTES NFR BLD AUTO: 1 % (ref 0–2)
LYMPHOCYTES # BLD AUTO: 1.47 THOUSANDS/ÂΜL (ref 0.6–4.47)
LYMPHOCYTES NFR BLD AUTO: 35 % (ref 14–44)
MCH RBC QN AUTO: 32.5 PG (ref 26.8–34.3)
MCHC RBC AUTO-ENTMCNC: 33.9 G/DL (ref 31.4–37.4)
MCV RBC AUTO: 96 FL (ref 82–98)
MONOCYTES # BLD AUTO: 0.35 THOUSAND/ÂΜL (ref 0.17–1.22)
MONOCYTES NFR BLD AUTO: 8 % (ref 4–12)
NEUTROPHILS # BLD AUTO: 2.22 THOUSANDS/ÂΜL (ref 1.85–7.62)
NEUTS SEG NFR BLD AUTO: 53 % (ref 43–75)
NRBC BLD AUTO-RTO: 0 /100 WBCS
PLATELET # BLD AUTO: 258 THOUSANDS/UL (ref 149–390)
PMV BLD AUTO: 10.1 FL (ref 8.9–12.7)
POTASSIUM SERPL-SCNC: 3.8 MMOL/L (ref 3.5–5.3)
PROT SERPL-MCNC: 7.5 G/DL (ref 6.4–8.4)
RBC # BLD AUTO: 4.64 MILLION/UL (ref 3.81–5.12)
SODIUM SERPL-SCNC: 141 MMOL/L (ref 135–147)
WBC # BLD AUTO: 4.18 THOUSAND/UL (ref 4.31–10.16)

## 2023-07-16 LAB — BACTERIA UR CULT: NORMAL

## 2023-07-20 NOTE — PRE-PROCEDURE INSTRUCTIONS
Pre-Surgery Instructions:   Medication Instructions   • albuterol (ProAir HFA) 90 mcg/act inhaler Uses PRN- OK to take day of surgery   • amphetamine-dextroamphetamine (ADDERALL XR) 15 MG 24 hr capsule Hold day of surgery. • carBAMazepine (TEGretol XR) 100 mg 12 hr tablet Take day of surgery. • cyclobenzaprine (FLEXERIL) 5 mg tablet Uses PRN- DO NOT take day of surgery   • famotidine (PEPCID) 40 MG tablet Uses PRN- OK to take day of surgery   • hydrOXYzine HCL (ATARAX) 10 mg tablet Uses PRN- OK to take day of surgery   • levonorgestrel (MIRENA) 20 MCG/24HR IUD Take day of surgery. • meloxicam (MOBIC) 15 mg tablet Stop taking 3 days prior to surgery. • sertraline (ZOLOFT) 100 mg tablet Take day of surgery. Pt verbalizes understanding of the following:    - avoid alcohol within 24hrs  - avoid marijuana within 12hrs    - Bathing instructions, will use dial  - No lotions, powders, sprays, deodorant, jewelry, body piercings, false lashes or make-up  - No shaving within 24hrs    - DO NOT EAT OR DRINK ANYTHING after midnight on the evening before your procedure including coffee, tea, gum or hard candy.    - ONLY SIPS OF WATER with your medications are allowed on the morning of your procedure.   - Avoid OTC non-directed Vit/ Suppl/ Herbals 7 days prior to surgery to ensure no drug interactions with perioperative surgical/ anesthetic meds  - Avoid NSAIDs 3 days prior  - Avoid ASA containing products 5 days prior  - Bring a list of meds you take at home with your last dose noted  - Bring INHALER you take for breathing problems     - Arrange for someone to drive you home after the procedure & stay with you until the next morning  - Bring insurance cards & photo id    - Leave all valuables such as credit cards, money & jewelry at home    - Notify surgeon if you develop any cold symptoms, change in your health history or develop a rash/ open wounds     - Did the surgeon's office give you any other special instructions? No  - Did surgeon require any clearances?  No

## 2023-07-21 PROCEDURE — NC001 PR NO CHARGE: Performed by: UROLOGY

## 2023-07-21 NOTE — H&P
HISTORY AND PHYSICAL  ? ? Patient Name: Patrick Raymundo  Patient MRN: [id number]  Attending Provider: Min Conrad MD  Service: Urology  Chief Complaint    Neurogenic bladder, urgency incontinence    HPI   Patrick Raymundo is a 30 y.o. female with  urgency incontinence related to  Neurogenic bladder. Symptoms urgency, urge incontinence, wears many pads per day. No infections     Last Botox was February 2022, worked quite well for 8 to 12 months.      I plan cystoscopy, Botox injection  Potential risks and complications discussed, and informed consent was given by the patient. Medications  Meds/Allergies        Cannot display prior to admission medications because the patient has not been admitted in this contact.        Current Facility-Administered Medications:   •  Botulinum Toxin Type A SOLR 200 Units, 200 Units, Injection, Once, Min Conrad MD  •  Botulinum Toxin Type A SOLR 200 Units, 200 Units, Injection, Once, Min Conrad MD    Current Outpatient Medications:   •  albuterol (ProAir HFA) 90 mcg/act inhaler, Inhale 2 puffs every 6 (six) hours as needed for wheezing or shortness of breath, Disp: 18 g, Rfl: 1  •  amphetamine-dextroamphetamine (ADDERALL XR) 15 MG 24 hr capsule, Take 15 mg by mouth daily in the early morning, Disp: , Rfl:   •  carBAMazepine (TEGretol XR) 100 mg 12 hr tablet, Take 100 mg by mouth in the morning, Disp: , Rfl:   •  cyclobenzaprine (FLEXERIL) 5 mg tablet, Take 1 tablet (5 mg total) by mouth 3 (three) times a day as needed for muscle spasms, Disp: 30 tablet, Rfl: 0  •  famotidine (PEPCID) 40 MG tablet, Take 1 tablet (40 mg total) by mouth daily (Patient taking differently: Take 40 mg by mouth 2 (two) times a day as needed), Disp: 30 tablet, Rfl: 5  •  hydrOXYzine HCL (ATARAX) 10 mg tablet, As needed for anxiety, Disp: , Rfl:   •  levonorgestrel (MIRENA) 20 MCG/24HR IUD, 20 mcg/day, Disp: , Rfl:   •  meloxicam (MOBIC) 15 mg tablet, Take 1 tablet (15 mg total) by mouth daily (Patient taking differently: Take 15 mg by mouth if needed), Disp: 30 tablet, Rfl: 0  •  sertraline (ZOLOFT) 100 mg tablet, Take 100 mg by mouth daily, Disp: , Rfl:   Review of Systems  10 point review of systems negative except as noted in HPI  Allergies  Allergies   Allergen Reactions   • Sulfamethoxazole Nausea Only and GI Intolerance     PMH  Past Medical History:   Diagnosis Date   • Anxiety    • Chronic pain disorder     back - thoracic area   • Closed fracture of coccyx (HCC)    • Closed fracture of distal end of ulna (alone)    • COVID 11/2022   • Depression    • Foot drop     Bilateral   • GERD (gastroesophageal reflux disease)    • History of spinal cord injury     at L1   • Mild acid reflux    • Small bowel problem     Chronic Constipation   • Urge incontinence    • Urinary incontinence 05/30/2013   • Use of cane as ambulatory aid     nguyen leg braces     Past surgical history  Past Surgical History:   Procedure Laterality Date   • BOTOX INJECTION N/A 7/29/2019    Procedure: INJECTION BOTULINUM TOXIN (BOTOX); Surgeon: Mars Garcia MD;  [facility];  Service: Urology   • BOTOX INJECTION N/A 4/13/2021    Procedure: INJECTION BOTULINUM TOXIN (BOTOX), cystoscopy;  Surgeon: Janet Whitlock MD;  [facility];  Service: Urology   • BOTOX INJECTION N/A 2/7/2022    Procedure: CYSTOSCOPY, INJECTION BOTULINUM TOXIN (BOTOX); Surgeon: Janet Whitlock MD;  [facility];  Service: Urology   • COLONOSCOPY     • CYSTOSCOPY      Diagnostic; onset: 1/3/17   • FL MYELOGRAM 2 OR MORE REGIONS  9/6/2018   • LA COLONOSCOPY FLX DX W/COLLJ SPEC WHEN PFRMD N/A 12/12/2017    Procedure: COLONOSCOPY;  Surgeon: Jamin Yoo MD;  [facility];   Service: Gastroenterology   • LA CYSTOURETHROSCOPY N/A 7/29/2019    Procedure: CYSTOSCOPY;  Surgeon: Mars Garcia MD;  [facility];  Service: Urology   • SPINAL FUSION  2013    T12-L2     Social history  Social History     Tobacco Use   • Smoking status: Former     Packs/day: 0.70     Years: 1.00     Total pack years: 0.70     Types: Cigarettes     Start date:      Quit date: 2016     Years since quittin.6   • Smokeless tobacco: Never   Vaping Use   • Vaping Use: Never used   Substance Use Topics   • Alcohol use: Yes     Alcohol/week: 4.0 standard drinks of alcohol     Types: 4 Standard drinks or equivalent per week     Comment: liquor   • Drug use: Yes     Types: Marijuana     ?   Physical Exam    .vs lungs clear to auscultation  General appearance: alert and oriented, in no acute distress  Head: Normocephalic, without obvious abnormality, atraumatic  Throat: lips, mucosa, and tongue normal; teeth and gums normal  Neck: no adenopathy, no carotid bruit, no JVD, supple, symmetrical, trachea midline and thyroid not enlarged, symmetric, no tenderness/mass/nodules  Heart: regular rate and rhythm, S1, S2 normal, no murmur, click, rub or gallop  Abdomen: soft, non-tender; bowel sounds normal; no masses,  no organomegaly  Extremities: extremities normal, warm and well-perfused; no cyanosis, clubbing, or edema  Russel Galeana MD

## 2023-07-24 ENCOUNTER — ANESTHESIA EVENT (OUTPATIENT)
Dept: PERIOP | Facility: HOSPITAL | Age: 38
End: 2023-07-24
Payer: COMMERCIAL

## 2023-07-25 ENCOUNTER — ANESTHESIA (OUTPATIENT)
Dept: PERIOP | Facility: HOSPITAL | Age: 38
End: 2023-07-25
Payer: COMMERCIAL

## 2023-07-25 ENCOUNTER — HOSPITAL ENCOUNTER (OUTPATIENT)
Facility: HOSPITAL | Age: 38
Setting detail: OUTPATIENT SURGERY
Discharge: HOME | End: 2023-07-25
Attending: UROLOGY | Admitting: UROLOGY
Payer: COMMERCIAL

## 2023-07-25 VITALS
OXYGEN SATURATION: 99 % | WEIGHT: 141 LBS | DIASTOLIC BLOOD PRESSURE: 65 MMHG | HEIGHT: 64 IN | TEMPERATURE: 98.5 F | BODY MASS INDEX: 24.07 KG/M2 | HEART RATE: 62 BPM | RESPIRATION RATE: 16 BRPM | SYSTOLIC BLOOD PRESSURE: 120 MMHG

## 2023-07-25 DIAGNOSIS — K21.9 GASTROESOPHAGEAL REFLUX DISEASE WITHOUT ESOPHAGITIS: ICD-10-CM

## 2023-07-25 LAB
EXT PREGNANCY TEST URINE: NEGATIVE
EXT. CONTROL: NORMAL

## 2023-07-25 PROCEDURE — 81025 URINE PREGNANCY TEST: CPT | Performed by: UROLOGY

## 2023-07-25 PROCEDURE — 52287 CYSTOSCOPY CHEMODENERVATION: CPT | Performed by: UROLOGY

## 2023-07-25 PROCEDURE — 99024 POSTOP FOLLOW-UP VISIT: CPT | Performed by: UROLOGY

## 2023-07-25 RX ORDER — FENTANYL CITRATE 50 UG/ML
INJECTION, SOLUTION INTRAMUSCULAR; INTRAVENOUS AS NEEDED
Status: DISCONTINUED | OUTPATIENT
Start: 2023-07-25 | End: 2023-07-25

## 2023-07-25 RX ORDER — ONDANSETRON 2 MG/ML
INJECTION INTRAMUSCULAR; INTRAVENOUS AS NEEDED
Status: DISCONTINUED | OUTPATIENT
Start: 2023-07-25 | End: 2023-07-25

## 2023-07-25 RX ORDER — LIDOCAINE HYDROCHLORIDE 10 MG/ML
0.5 INJECTION, SOLUTION EPIDURAL; INFILTRATION; INTRACAUDAL; PERINEURAL ONCE AS NEEDED
Status: DISCONTINUED | OUTPATIENT
Start: 2023-07-25 | End: 2023-07-25 | Stop reason: HOSPADM

## 2023-07-25 RX ORDER — CEFAZOLIN SODIUM 1 G/50ML
1000 SOLUTION INTRAVENOUS ONCE
Status: DISCONTINUED | OUTPATIENT
Start: 2023-07-25 | End: 2023-07-25 | Stop reason: HOSPADM

## 2023-07-25 RX ORDER — ONDANSETRON 2 MG/ML
4 INJECTION INTRAMUSCULAR; INTRAVENOUS ONCE AS NEEDED
Status: DISCONTINUED | OUTPATIENT
Start: 2023-07-25 | End: 2023-07-25 | Stop reason: HOSPADM

## 2023-07-25 RX ORDER — MIDAZOLAM HYDROCHLORIDE 2 MG/2ML
INJECTION, SOLUTION INTRAMUSCULAR; INTRAVENOUS AS NEEDED
Status: DISCONTINUED | OUTPATIENT
Start: 2023-07-25 | End: 2023-07-25

## 2023-07-25 RX ORDER — PROPOFOL 10 MG/ML
INJECTION, EMULSION INTRAVENOUS AS NEEDED
Status: DISCONTINUED | OUTPATIENT
Start: 2023-07-25 | End: 2023-07-25

## 2023-07-25 RX ORDER — FENTANYL CITRATE/PF 50 MCG/ML
50 SYRINGE (ML) INJECTION
Status: DISCONTINUED | OUTPATIENT
Start: 2023-07-25 | End: 2023-07-25 | Stop reason: HOSPADM

## 2023-07-25 RX ORDER — DEXAMETHASONE SODIUM PHOSPHATE 10 MG/ML
INJECTION, SOLUTION INTRAMUSCULAR; INTRAVENOUS AS NEEDED
Status: DISCONTINUED | OUTPATIENT
Start: 2023-07-25 | End: 2023-07-25

## 2023-07-25 RX ORDER — LIDOCAINE HYDROCHLORIDE 10 MG/ML
INJECTION, SOLUTION EPIDURAL; INFILTRATION; INTRACAUDAL; PERINEURAL AS NEEDED
Status: DISCONTINUED | OUTPATIENT
Start: 2023-07-25 | End: 2023-07-25

## 2023-07-25 RX ORDER — SODIUM CHLORIDE, SODIUM LACTATE, POTASSIUM CHLORIDE, CALCIUM CHLORIDE 600; 310; 30; 20 MG/100ML; MG/100ML; MG/100ML; MG/100ML
125 INJECTION, SOLUTION INTRAVENOUS CONTINUOUS
Status: DISCONTINUED | OUTPATIENT
Start: 2023-07-25 | End: 2023-07-25 | Stop reason: HOSPADM

## 2023-07-25 RX ADMIN — PROPOFOL 200 MG: 10 INJECTION, EMULSION INTRAVENOUS at 14:57

## 2023-07-25 RX ADMIN — LIDOCAINE HYDROCHLORIDE 50 MG: 10 INJECTION, SOLUTION EPIDURAL; INFILTRATION; INTRACAUDAL; PERINEURAL at 14:57

## 2023-07-25 RX ADMIN — ONDANSETRON 4 MG: 2 INJECTION INTRAMUSCULAR; INTRAVENOUS at 14:57

## 2023-07-25 RX ADMIN — FENTANYL CITRATE 100 MCG: 50 INJECTION, SOLUTION INTRAMUSCULAR; INTRAVENOUS at 14:57

## 2023-07-25 RX ADMIN — MIDAZOLAM 2 MG: 1 INJECTION INTRAMUSCULAR; INTRAVENOUS at 14:52

## 2023-07-25 RX ADMIN — DEXAMETHASONE SODIUM PHOSPHATE 10 MG: 10 INJECTION, SOLUTION INTRAMUSCULAR; INTRAVENOUS at 14:57

## 2023-07-25 RX ADMIN — SODIUM CHLORIDE, SODIUM LACTATE, POTASSIUM CHLORIDE, AND CALCIUM CHLORIDE: .6; .31; .03; .02 INJECTION, SOLUTION INTRAVENOUS at 14:25

## 2023-07-25 NOTE — INTERVAL H&P NOTE
H&P reviewed. After examining the patient I find no changes in the patients condition since the H&P had been written.     Vitals:    07/25/23 1245   BP: 118/70   Pulse: 66   Resp: 20   Temp: 98.9 °F (37.2 °C)   SpO2: 99%

## 2023-07-25 NOTE — DISCHARGE INSTR - AVS FIRST PAGE
ALL YOUR  PREVIOUS MEDS ARE THE SAME. NEW MEDS: None    EXPECT SOME BLOOD IN URINE, BURNING, FREQUENT URINATION. ACTIVITY:  RESUME FULL ACTIVITY 24 hours.

## 2023-07-25 NOTE — ANESTHESIA POSTPROCEDURE EVALUATION
Post-Op Assessment Note    CV Status:  Stable  Pain Score: 0    Pain management: adequate     Mental Status:  Awake   Hydration Status:  Stable   PONV Controlled:  None   Airway Patency:  Patent      Post Op Vitals Reviewed: Yes      Staff: CRNA         No notable events documented.     BP   127/83   Temp      Pulse  89   Resp   12   SpO2   99

## 2023-07-25 NOTE — DISCHARGE SUMMARY
Discharge Summary - Ingris Jaquez 30 y.o. female MRN: [id number]    Admission Date: 7/25/2023     Admitting Diagnosis: Urge incontinence [N39.41]    Procedures Performed: Cystoscopy, Botox injection    Patient underwent planned outpt surgery and recovered without complication. Discharged in good condition. Medications were prescribed. Pt knows to have office follow-up at the appropriate time.

## 2023-07-25 NOTE — OP NOTE
OPERATIVE REPORT  PATIENT NAME: Barb Greenfield    :  1985  MRN: [id number]  Pt Location: [facility] 01    SURGERY DATE: 2023    Surgeon(s) and Role:     * Jacek Salomon MD - Primary    Preop Diagnosis:  Urge incontinence [N39.41]    Post-Op Diagnosis Codes:     * Urge incontinence [N39.41]    Procedure(s):  INJECTION BOTULINUM TOXIN (BOTOX). cystoscopy    Specimen(s):  * No specimens in log *    Estimated Blood Loss:   Minimal    Drains:  * No LDAs found *    Anesthesia Type:   IV Sedation with Anesthesia    Operative Indications:  Urge incontinence [N39.41]      Operative Findings:  Mild trabeculation bladder, minimal PVR    Complications:   None    Procedure and Technique:    After the patient was placed under adequate general anesthesia,they were prepped and draped using chlorhexidine solution in lithotomy position. The 25 Cameroonian scope was passed without difficulty in the urethra which had no strictures. The bladder had mild trabeculations and minimal PVR     The bladder was flushed out to remove any residual contaminated urine. The Botox was then injected. 200 units Botox had been in mixed in 30 mL sterile saline by pharmacy. I injected 1 mL in 30 different locations on the posterior wall. This was in a grid 6 x 5. Minimal bleeding was encountered. Care was taken to not inject around the trigone or the orifice sees. After thorough injection of Botox, scope was removed, Jonas catheter inserted, patient taken to recovery in good condition. I was present for the entire procedure.     Patient Disposition:  PACU         SIGNATURE: Jacek Salomon MD  DATE: 2023  TIME: 3:43 PM

## 2023-07-26 ENCOUNTER — TELEPHONE (OUTPATIENT)
Dept: UROLOGY | Facility: HOSPITAL | Age: 38
End: 2023-07-26

## 2023-07-26 NOTE — TELEPHONE ENCOUNTER
Post Op Note    Sarkis Yarbrough is a 30 y.o. female s/p Cysto/Botox performed 07/25/2023. Sarkis Yarbrough is a patient of Dr. Sudhir Torres and is seen at the [facility] office. How would you rate your pain on a scale from 1 to 10, 10 being the worst pain ever? 0  Have you had a fever? No  Have your bowel movements been regular? No  Do you have any difficulty urinating? No    Do you have any other questions or concerns that I can address at this time?   Patient had surgery before and knows what to expect - reminded her of follow up appointment on thur 9/14 @9:00

## 2023-08-22 ENCOUNTER — OFFICE VISIT (OUTPATIENT)
Dept: GASTROENTEROLOGY | Facility: CLINIC | Age: 38
End: 2023-08-22
Payer: COMMERCIAL

## 2023-08-22 ENCOUNTER — TELEPHONE (OUTPATIENT)
Dept: GASTROENTEROLOGY | Facility: CLINIC | Age: 38
End: 2023-08-22

## 2023-08-22 VITALS
SYSTOLIC BLOOD PRESSURE: 118 MMHG | DIASTOLIC BLOOD PRESSURE: 70 MMHG | BODY MASS INDEX: 25.27 KG/M2 | HEIGHT: 64 IN | WEIGHT: 148 LBS

## 2023-08-22 DIAGNOSIS — K21.9 GASTROESOPHAGEAL REFLUX DISEASE, UNSPECIFIED WHETHER ESOPHAGITIS PRESENT: ICD-10-CM

## 2023-08-22 DIAGNOSIS — Z86.010 PERSONAL HISTORY OF COLONIC POLYPS: ICD-10-CM

## 2023-08-22 DIAGNOSIS — K59.09 CHRONIC CONSTIPATION: Primary | ICD-10-CM

## 2023-08-22 PROCEDURE — 99214 OFFICE O/P EST MOD 30 MIN: CPT | Performed by: INTERNAL MEDICINE

## 2023-08-22 NOTE — PROGRESS NOTES
Fili Navarrete [facility] - Outpatient Follow-up Note  Jose Armando Lee 30 y.o. female MRN: [id number]  Encounter: [id number]    ASSESSMENT AND PLAN:      1. Chronic constipation  Longstanding complaint. Fair control when she is exercising regularly, focusing on fluid intake, and taking psyllium. She takes MiraLAX intermittently but it induces diarrhea. She also diarrhea with Linzess in the past    Continue psyllium, titrating up as needed. We will assess for a structural abnormality on upcoming colonoscopy. She often requires abdominal maneuvers, abdominal massage, and manual disimpaction. We will plan an assessment by pelvic floor physical therapy     2. Personal history of colonic polyps  Adenoma 2017 with another practice, arrange surveillance colonoscopy now    3. Gastroesophageal reflux disease, unspecified whether esophagitis present  Intermittent reflux, typically with acidic foods like Posta sauce. No alarm symptoms. Continue famotidine as needed      Followup Appointment: Colonoscopy now, office as needed  ______________________________________________________________________    Chief Complaint   Patient presents with   • Follow up-due for colonoscopy     HPI: The patient presents for follow-up on constipation and GERD. She was last seen in the office July 2022. Colonoscopy was discussed but never arranged. She continues to have issues with difficulty moving her bowels. If she is not active it could be up to 3 days between stools. If she is exercising regularly, eating enough greens, and drinking water she generally has 1 stool per day. She has the massage her stomach and often provide digital stimulation to initiate a stool. She denies rectal bleeding. There is no abdominal pain. Her appetite is good and her weight is stable.   She has diarrhea if she takes MiraLAX, and had poor results with Linzess in the past    Historical Information   Past Medical History: Diagnosis Date   • Anxiety    • Chronic pain disorder     back - thoracic area   • Closed fracture of coccyx (HCC)    • Closed fracture of distal end of ulna (alone)    • COVID 11/2022   • Depression    • Foot drop     Bilateral   • GERD (gastroesophageal reflux disease)    • History of spinal cord injury     at L1   • Mild acid reflux    • Small bowel problem     Chronic Constipation   • Urge incontinence    • Urinary incontinence 05/30/2013   • Use of cane as ambulatory aid     nguyen leg braces     Past Surgical History:   Procedure Laterality Date   • BOTOX INJECTION N/A 7/29/2019    Procedure: INJECTION BOTULINUM TOXIN (BOTOX); Surgeon: Betzaida Iverson MD;  [facility];  Service: Urology   • BOTOX INJECTION N/A 4/13/2021    Procedure: INJECTION BOTULINUM TOXIN (BOTOX), cystoscopy;  Surgeon: Sanjuanita Coulter MD;  [facility];  Service: Urology   • BOTOX INJECTION N/A 2/7/2022    Procedure: CYSTOSCOPY, INJECTION BOTULINUM TOXIN (BOTOX); Surgeon: Sanjuanita Coulter MD;  [facility];  Service: Urology   • BOTOX INJECTION N/A 7/25/2023    Procedure: INJECTION BOTULINUM TOXIN (BOTOX), cystoscopy;  Surgeon: Sanjuanita Coulter MD;  [facility];  Service: Urology   • COLONOSCOPY     • CYSTOSCOPY      Diagnostic; onset: 1/3/17   • FL MYELOGRAM 2 OR MORE REGIONS  9/6/2018   • VT COLONOSCOPY FLX DX W/COLLJ SPEC WHEN PFRMD N/A 12/12/2017    Procedure: COLONOSCOPY;  Surgeon: Jeff Díaz MD;  [facility];   Service: Gastroenterology   • VT CYSTOURETHROSCOPY N/A 7/29/2019    Procedure: CYSTOSCOPY;  Surgeon: Betzaida Iverson MD;  [facility];  Service: Urology   • SPINAL FUSION  2013    T12-L2     Social History     Substance and Sexual Activity   Alcohol Use Yes   • Alcohol/week: 4.0 standard drinks of alcohol   • Types: 4 Standard drinks or equivalent per week    Comment: liquor     Social History     Substance and Sexual Activity   Drug Use Yes   • Types: Marijuana     Social History     Tobacco Use   Smoking Status Former   • Packs/day: 0.70   • Years: 1.00   • Total pack years: 0.70   • Types: Cigarettes   • Start date:    • Quit date: 2016   • Years since quittin.6   Smokeless Tobacco Never     Family History   Problem Relation Age of Onset   • Depression Mother    • Anxiety disorder Mother    • Diabetes Father    • Lymphoma Father         Waldenstrom's   • Heart disease Father         cardiac disorder   • Hypertension Father    • Pulmonary embolism Father    • Hyperlipidemia Father    • Cancer Father    • Heart attack Father    • Clotting disorder Father    • ADD / ADHD Father    • No Known Problems Sister    • No Known Problems Brother    • Stroke Maternal Grandmother    • Heart disease Paternal Grandfather    • Stroke Family    • Colon polyps Neg Hx    • Colon cancer Neg Hx          Current Outpatient Medications:   •  albuterol (ProAir HFA) 90 mcg/act inhaler  •  amphetamine-dextroamphetamine (ADDERALL XR) 15 MG 24 hr capsule  •  carBAMazepine (TEGretol XR) 100 mg 12 hr tablet  •  cyclobenzaprine (FLEXERIL) 5 mg tablet  •  famotidine (PEPCID) 40 MG tablet  •  hydrOXYzine HCL (ATARAX) 10 mg tablet  •  levonorgestrel (MIRENA) 20 MCG/24HR IUD  •  meloxicam (MOBIC) 15 mg tablet  •  sertraline (ZOLOFT) 100 mg tablet  Allergies   Allergen Reactions   • Sulfamethoxazole Nausea Only and GI Intolerance     Reviewed medications and allergies and updated as indicated    PHYSICAL EXAM:    Blood pressure 118/70, height 5' 4" (1.626 m), weight 67.1 kg (148 lb), last menstrual period 2023. Body mass index is 25.4 kg/m². General Appearance: NAD, cooperative, alert  Eyes: Anicteric, PERRLA, EOMI  ENT:  Normocephalic, atraumatic, normal mucosa. Neck:  Supple, symmetrical, trachea midline  Resp:  Clear to auscultation bilaterally; no rales, rhonchi or wheezing; respirations unlabored   CV:  S1 S2, Regular rate and rhythm; no murmur, rub, or gallop.   GI:  Soft, non-tender, non-distended; normal bowel sounds; no masses, no organomegaly   Rectal: Deferred  Musculoskeletal: No cyanosis, clubbing or edema. Normal ROM. Skin:  No jaundice, rashes, or lesions   Heme/Lymph: No palpable cervical lymphadenopathy  Psych: Normal affect, good eye contact  Neuro: No gross deficits, AAOx3    Lab Results:   Lab Results   Component Value Date    WBC 4.18 (L) 07/13/2023    HGB 15.1 07/13/2023    HCT 44.6 07/13/2023    MCV 96 07/13/2023     07/13/2023     Lab Results   Component Value Date     06/21/2017    K 3.8 07/13/2023     07/13/2023    CO2 29 07/13/2023    ANIONGAP 8 12/28/2015    BUN 11 07/13/2023    CREATININE 0.73 07/13/2023    GLUCOSE 92 12/28/2015    GLUF 88 01/09/2023    CALCIUM 9.0 07/13/2023    AST 23 07/13/2023    ALT 21 07/13/2023    ALKPHOS 69 07/13/2023    PROT 7.1 06/21/2017    BILITOT 0.5 06/21/2017    EGFR 107 07/13/2023     Lab Results   Component Value Date    IRON 124 09/04/2018    FERRITIN 68 09/04/2018     No results found for: "LIPASE"    Radiology Results:   No results found.

## 2023-08-22 NOTE — TELEPHONE ENCOUNTER
Scheduled date of colonoscopy (as of today): 10/3/23  Physician performing colonoscopy: Dr Jackqueline Litten  Location of colonoscopy: [facility] ([facility]  Bowel prep reviewed with patient: miralax  Instructions reviewed with patient by: gave patient packet  Clearances: No

## 2023-09-14 ENCOUNTER — TELEPHONE (OUTPATIENT)
Dept: GASTROENTEROLOGY | Facility: CLINIC | Age: 38
End: 2023-09-14

## 2023-09-14 NOTE — TELEPHONE ENCOUNTER
lvm for pt to call and reschedule colonoscopy to [facility]  Pt's insurance is out of network at [facility] colonoscopy that was scheduled for 10/3/23 at [address] [place] [address] has been cancelled

## 2023-09-14 NOTE — TELEPHONE ENCOUNTER
----- Message from Rony Madrigal sent at 2023  9:05 AM EDT -----  [facility]  Patient is scheduled 10/3/23 at [facility] for colonoscopy. Please reschedule to hospital, Endo is non par with her insurance.  Thank you

## 2023-09-27 ENCOUNTER — OFFICE VISIT (OUTPATIENT)
Dept: UROLOGY | Facility: HOSPITAL | Age: 38
End: 2023-09-27
Payer: COMMERCIAL

## 2023-09-27 VITALS
OXYGEN SATURATION: 99 % | HEART RATE: 72 BPM | SYSTOLIC BLOOD PRESSURE: 126 MMHG | HEIGHT: 64 IN | BODY MASS INDEX: 25.4 KG/M2 | DIASTOLIC BLOOD PRESSURE: 82 MMHG

## 2023-09-27 DIAGNOSIS — N39.41 URGE INCONTINENCE: Primary | ICD-10-CM

## 2023-09-27 LAB
POST-VOID RESIDUAL VOLUME, ML POC: 0 ML
SL AMB  POCT GLUCOSE, UA: NORMAL
SL AMB LEUKOCYTE ESTERASE,UA: NORMAL
SL AMB POCT BILIRUBIN,UA: NORMAL
SL AMB POCT BLOOD,UA: NORMAL
SL AMB POCT CLARITY,UA: CLEAR
SL AMB POCT COLOR,UA: YELLOW
SL AMB POCT KETONES,UA: NORMAL
SL AMB POCT NITRITE,UA: NORMAL
SL AMB POCT PH,UA: 5
SL AMB POCT SPECIFIC GRAVITY,UA: 1.01
SL AMB POCT URINE PROTEIN: NORMAL
SL AMB POCT UROBILINOGEN: 0.2

## 2023-09-27 PROCEDURE — 99213 OFFICE O/P EST LOW 20 MIN: CPT | Performed by: PHYSICIAN ASSISTANT

## 2023-09-27 PROCEDURE — 87086 URINE CULTURE/COLONY COUNT: CPT | Performed by: PHYSICIAN ASSISTANT

## 2023-09-27 PROCEDURE — 51798 US URINE CAPACITY MEASURE: CPT | Performed by: PHYSICIAN ASSISTANT

## 2023-09-27 PROCEDURE — 81002 URINALYSIS NONAUTO W/O SCOPE: CPT | Performed by: PHYSICIAN ASSISTANT

## 2023-09-27 RX ORDER — GABAPENTIN 300 MG/1
300 CAPSULE ORAL
COMMUNITY
Start: 2023-09-18

## 2023-09-27 NOTE — PROGRESS NOTES
1. Urge incontinence  POCT urine dip    POCT Measure PVR    Urine culture          Assessment and plan:       Urge urinary incontinence   - continue to tolerate Botox 200U well  - repeat in 8-10 months       Evelin Rome PA-C      Chief Complaint     Urinary incontinence    History of Present Illness     Ermias Parikh is a 30 y.o. female presenting today for follow up. History of OAB/NGB - previously failed myrebtriq, oxybutynin, and PTNS therapies. S/p bladder botox  200U 7/25/23 with Dr. Lillie Cornejo in the [facility]. Tolerating well     Medical comorbdities include spinal cord injury, consitpation, bipolar, ADHD. Urine dip large leukocyte, nitrite negative, trace blood. PVR 0mL    Laboratory     Lab Results   Component Value Date    CREATININE 0.73 07/13/2023       Review of Systems     Review of Systems   Constitutional:  Negative for activity change, appetite change, chills, diaphoresis, fatigue, fever and unexpected weight change. Respiratory:  Negative for chest tightness and shortness of breath. Cardiovascular:  Negative for chest pain, palpitations and leg swelling. Gastrointestinal:  Negative for abdominal distention, abdominal pain, constipation, diarrhea, nausea and vomiting. Genitourinary:  Negative for decreased urine volume, difficulty urinating, dysuria, enuresis, flank pain, frequency, genital sores, hematuria and urgency. Musculoskeletal:  Negative for back pain, gait problem and myalgias. Skin:  Negative for color change, pallor, rash and wound. Psychiatric/Behavioral:  Negative for behavioral problems. The patient is not nervous/anxious. Allergies     Allergies   Allergen Reactions    Sulfamethoxazole Nausea Only and GI Intolerance       Physical Exam     Physical Exam  Constitutional:       General: She is not in acute distress. Appearance: Normal appearance. She is normal weight. She is not ill-appearing, toxic-appearing or diaphoretic.    HENT: Head: Normocephalic and atraumatic. Eyes:      General:         Right eye: No discharge. Left eye: No discharge. Conjunctiva/sclera: Conjunctivae normal.   Pulmonary:      Effort: Pulmonary effort is normal. No respiratory distress. Musculoskeletal:         General: No swelling or tenderness. Normal range of motion. Skin:     General: Skin is warm and dry. Coloration: Skin is not jaundiced or pale. Neurological:      General: No focal deficit present. Mental Status: She is alert and oriented to person, place, and time. Psychiatric:         Mood and Affect: Mood normal.         Behavior: Behavior normal.         Thought Content:  Thought content normal.           Vital Signs     Vitals:    09/27/23 0927   BP: 126/82   BP Location: Left arm   Patient Position: Sitting   Cuff Size: Adult   Pulse: 72   SpO2: 99%   Height: 5' 4" (1.626 m)         Current Medications       Current Outpatient Medications:     albuterol (ProAir HFA) 90 mcg/act inhaler, Inhale 2 puffs every 6 (six) hours as needed for wheezing or shortness of breath, Disp: 18 g, Rfl: 1    amphetamine-dextroamphetamine (ADDERALL XR) 15 MG 24 hr capsule, Take 15 mg by mouth daily in the early morning, Disp: , Rfl:     cyclobenzaprine (FLEXERIL) 5 mg tablet, Take 1 tablet (5 mg total) by mouth 3 (three) times a day as needed for muscle spasms, Disp: 30 tablet, Rfl: 0    famotidine (PEPCID) 40 MG tablet, Take 1 tablet (40 mg total) by mouth daily (Patient taking differently: Take 40 mg by mouth if needed), Disp: 30 tablet, Rfl: 5    gabapentin (NEURONTIN) 300 mg capsule, 300 mg, Disp: , Rfl:     hydrOXYzine HCL (ATARAX) 10 mg tablet, As needed for anxiety, Disp: , Rfl:     levonorgestrel (MIRENA) 20 MCG/24HR IUD, 20 mcg/day, Disp: , Rfl:     sertraline (ZOLOFT) 100 mg tablet, Take 100 mg by mouth daily, Disp: , Rfl:     carBAMazepine (TEGretol XR) 100 mg 12 hr tablet, Take 100 mg by mouth in the morning (Patient not taking: Reported on 9/27/2023), Disp: , Rfl:     meloxicam (MOBIC) 15 mg tablet, Take 1 tablet (15 mg total) by mouth daily (Patient not taking: Reported on 9/27/2023), Disp: 30 tablet, Rfl: 0      Active Problems     Patient Active Problem List   Diagnosis    Paronychia of toe of right foot    Constipation    H/O spinal cord injury    Chronic bilateral thoracic back pain    Low vitamin B12 level    Vegetarian diet    Injury to L1 level of spinal cord (HCC)    Bipolar affective disorder (HCC)    Lumbar radiculopathy    Myofascial pain    Urinary urgency    Night sweats    Snoring    Urge incontinence    Neurogenic bladder    Nausea    Heartburn    Throat clearing    Drop foot gait    ADHD         Past Medical History     Past Medical History:   Diagnosis Date    Anxiety     Chronic pain disorder     back - thoracic area    Closed fracture of coccyx (HCC)     Closed fracture of distal end of ulna (alone)     COVID 11/2022    Depression     Foot drop     Bilateral    GERD (gastroesophageal reflux disease)     History of spinal cord injury     at L1    Mild acid reflux     Small bowel problem     Chronic Constipation    Urge incontinence     Urinary incontinence 05/30/2013    Use of cane as ambulatory aid     nguyen leg braces         Surgical History     Past Surgical History:   Procedure Laterality Date    BOTOX INJECTION N/A 7/29/2019    Procedure: INJECTION BOTULINUM TOXIN (BOTOX); Surgeon: Radha Whelan MD;  [facility];  Service: Urology    BOTOX INJECTION N/A 4/13/2021    Procedure: INJECTION BOTULINUM TOXIN (BOTOX), cystoscopy;  Surgeon: Erika Barbour MD;  [facility];  Service: Urology    BOTOX INJECTION N/A 2/7/2022    Procedure: CYSTOSCOPY, INJECTION BOTULINUM TOXIN (BOTOX);   Surgeon: Erika Barbour MD;  [facility];  Service: Urology    BOTOX INJECTION N/A 7/25/2023    Procedure: INJECTION BOTULINUM TOXIN (BOTOX), cystoscopy;  Surgeon: Erika Barbour MD;  [facility];  Service: Urology    COLONOSCOPY      CYSTOSCOPY      Diagnostic; onset: 1/3/17    FL MYELOGRAM 2 OR MORE REGIONS  9/6/2018    OK COLONOSCOPY FLX DX W/COLLJ SPEC WHEN PFRMD N/A 12/12/2017    Procedure: COLONOSCOPY;  Surgeon: Kaitlin Diego MD;  [facility];   Service: Gastroenterology    OK CYSTOURETHROSCOPY N/A 7/29/2019    Procedure: CYSTOSCOPY;  Surgeon: Hortensia Keyes MD;  [facility];  Service: Urology    SPINAL FUSION  2013    T12-L2         Family History     Family History   Problem Relation Age of Onset    Depression Mother     Anxiety disorder Mother     Diabetes Father     Lymphoma Father         Waldenstrom's    Heart disease Father         cardiac disorder    Hypertension Father     Pulmonary embolism Father     Hyperlipidemia Father     Cancer Father     Heart attack Father     Clotting disorder Father     ADD / ADHD Father     No Known Problems Sister     No Known Problems Brother     Stroke Maternal Grandmother     Heart disease Paternal Grandfather     Stroke Family     Colon polyps Neg Hx     Colon cancer Neg Hx          Social History     Social History       Radiology

## 2023-09-29 LAB — BACTERIA UR CULT: NORMAL

## 2023-10-27 ENCOUNTER — ANESTHESIA (OUTPATIENT)
Dept: GASTROENTEROLOGY | Facility: HOSPITAL | Age: 38
End: 2023-10-27

## 2023-10-27 ENCOUNTER — HOSPITAL ENCOUNTER (OUTPATIENT)
Dept: GASTROENTEROLOGY | Facility: HOSPITAL | Age: 38
Setting detail: OUTPATIENT SURGERY
Discharge: HOME | End: 2023-10-27
Attending: INTERNAL MEDICINE | Admitting: INTERNAL MEDICINE
Payer: COMMERCIAL

## 2023-10-27 ENCOUNTER — ANESTHESIA EVENT (OUTPATIENT)
Dept: GASTROENTEROLOGY | Facility: HOSPITAL | Age: 38
End: 2023-10-27

## 2023-10-27 VITALS
SYSTOLIC BLOOD PRESSURE: 101 MMHG | DIASTOLIC BLOOD PRESSURE: 64 MMHG | OXYGEN SATURATION: 99 % | RESPIRATION RATE: 12 BRPM | HEART RATE: 66 BPM | TEMPERATURE: 98 F

## 2023-10-27 DIAGNOSIS — K21.9 GASTROESOPHAGEAL REFLUX DISEASE WITHOUT ESOPHAGITIS: ICD-10-CM

## 2023-10-27 DIAGNOSIS — Z86.010 PERSONAL HISTORY OF COLONIC POLYPS: ICD-10-CM

## 2023-10-27 LAB
EXT PREGNANCY TEST URINE: NEGATIVE
EXT. CONTROL: NORMAL

## 2023-10-27 PROCEDURE — 88305 TISSUE EXAM BY PATHOLOGIST: CPT | Performed by: PATHOLOGY

## 2023-10-27 PROCEDURE — 45385 COLONOSCOPY W/LESION REMOVAL: CPT | Performed by: INTERNAL MEDICINE

## 2023-10-27 PROCEDURE — 81025 URINE PREGNANCY TEST: CPT | Performed by: ANESTHESIOLOGY

## 2023-10-27 RX ORDER — SODIUM CHLORIDE, SODIUM LACTATE, POTASSIUM CHLORIDE, CALCIUM CHLORIDE 600; 310; 30; 20 MG/100ML; MG/100ML; MG/100ML; MG/100ML
50 INJECTION, SOLUTION INTRAVENOUS CONTINUOUS
Status: DISCONTINUED | OUTPATIENT
Start: 2023-10-27 | End: 2023-10-31 | Stop reason: HOSPADM

## 2023-10-27 RX ORDER — PROPOFOL 10 MG/ML
INJECTION, EMULSION INTRAVENOUS AS NEEDED
Status: DISCONTINUED | OUTPATIENT
Start: 2023-10-27 | End: 2023-10-27

## 2023-10-27 RX ADMIN — PROPOFOL 50 MG: 10 INJECTION, EMULSION INTRAVENOUS at 10:19

## 2023-10-27 RX ADMIN — PROPOFOL 80 MG: 10 INJECTION, EMULSION INTRAVENOUS at 10:08

## 2023-10-27 RX ADMIN — PROPOFOL 50 MG: 10 INJECTION, EMULSION INTRAVENOUS at 10:11

## 2023-10-27 RX ADMIN — PROPOFOL 50 MG: 10 INJECTION, EMULSION INTRAVENOUS at 10:16

## 2023-10-27 RX ADMIN — SODIUM CHLORIDE, SODIUM LACTATE, POTASSIUM CHLORIDE, AND CALCIUM CHLORIDE: .6; .31; .03; .02 INJECTION, SOLUTION INTRAVENOUS at 10:01

## 2023-10-27 NOTE — ANESTHESIA PREPROCEDURE EVALUATION
Procedure:  COLONOSCOPY    Relevant Problems   CARDIO   (+) Chronic bilateral thoracic back pain      MUSCULOSKELETAL   (+) Chronic bilateral thoracic back pain      NEURO/PSYCH   (+) Chronic bilateral thoracic back pain        Physical Exam    Airway    Mallampati score: II  TM Distance: >3 FB  Neck ROM: full     Dental       Cardiovascular      Pulmonary      Other Findings        Anesthesia Plan  ASA Score- 2     Anesthesia Type- IV sedation with anesthesia with ASA Monitors. Additional Monitors:     Airway Plan:            Plan Factors-    Chart reviewed. Induction- intravenous. Postoperative Plan-     Informed Consent- Anesthetic plan and risks discussed with patient. I personally reviewed this patient with the CRNA. Discussed and agreed on the Anesthesia Plan with the CRNA. Bong Copeland

## 2023-10-27 NOTE — ANESTHESIA POSTPROCEDURE EVALUATION
Post-Op Assessment Note    CV Status:  Stable  Pain Score: 0    Pain management: adequate     Mental Status:  Lethargic and sleepy   Hydration Status:  Stable   PONV Controlled:  None   Airway Patency:  Patent      Post Op Vitals Reviewed: Yes      Staff: Anesthesiologist, CRNA   Comments: vss      No notable events documented.     BP      Temp      Pulse     Resp      SpO2

## 2023-10-27 NOTE — H&P
History and Physical - [facility]  Priscilla Richardson 41 y.o. female MRN: [id number]                  HPI: Priscilla Richardson is a 41y.o. year old female who presents for colonoscopy      REVIEW OF SYSTEMS: Per the HPI, and otherwise unremarkable. Historical Information   Past Medical History:   Diagnosis Date    Anxiety     Chronic pain disorder     back - thoracic area    Closed fracture of coccyx (HCC)     Closed fracture of distal end of ulna (alone)     COVID 11/2022    Depression     Foot drop     Bilateral    GERD (gastroesophageal reflux disease)     History of spinal cord injury     at L1    Mild acid reflux     Small bowel problem     Chronic Constipation    Urge incontinence     Urinary incontinence 05/30/2013    Use of cane as ambulatory aid     nguyen leg braces     Past Surgical History:   Procedure Laterality Date    BOTOX INJECTION N/A 7/29/2019    Procedure: INJECTION BOTULINUM TOXIN (BOTOX); Surgeon: Enid Echavarria MD;  [facility];  Service: Urology    BOTOX INJECTION N/A 4/13/2021    Procedure: INJECTION BOTULINUM TOXIN (BOTOX), cystoscopy;  Surgeon: Baltazar Brand MD;  [facility];  Service: Urology    BOTOX INJECTION N/A 2/7/2022    Procedure: CYSTOSCOPY, INJECTION BOTULINUM TOXIN (BOTOX); Surgeon: Baltazar Brand MD;  [facility];  Service: Urology    BOTOX INJECTION N/A 7/25/2023    Procedure: INJECTION BOTULINUM TOXIN (BOTOX), cystoscopy;  Surgeon: Baltazar Brand MD;  [facility];  Service: Urology    COLONOSCOPY      CYSTOSCOPY      Diagnostic; onset: 1/3/17    FL MYELOGRAM 2 OR MORE REGIONS  9/6/2018    OH COLONOSCOPY FLX DX W/COLLJ SPEC WHEN PFRMD N/A 12/12/2017    Procedure: COLONOSCOPY;  Surgeon: Elisa Martinez MD;  [facility];   Service: Gastroenterology    OH CYSTOURETHROSCOPY N/A 7/29/2019    Procedure: Alison Mortimer;  Surgeon: Enid Echavarria MD;  [facility];  Service: Urology    SPINAL FUSION  2013    T12-L2 Social History   Social History     Substance and Sexual Activity   Alcohol Use Yes    Alcohol/week: 4.0 standard drinks of alcohol    Types: 4 Standard drinks or equivalent per week    Comment: liquor     Social History     Substance and Sexual Activity   Drug Use Not Currently    Types: Marijuana     Social History     Tobacco Use   Smoking Status Former    Packs/day: 0.70    Years: 1.00    Total pack years: 0.70    Types: Cigarettes    Start date:     Quit date: 2016    Years since quittin.8   Smokeless Tobacco Never     Family History   Problem Relation Age of Onset    Depression Mother     Anxiety disorder Mother     Diabetes Father     Lymphoma Father         Waldenstrom's    Heart disease Father         cardiac disorder    Hypertension Father     Pulmonary embolism Father     Hyperlipidemia Father     Cancer Father     Heart attack Father     Clotting disorder Father     ADD / ADHD Father     No Known Problems Sister     No Known Problems Brother     Stroke Maternal Grandmother     Heart disease Paternal Grandfather     Stroke Family     Colon polyps Neg Hx     Colon cancer Neg Hx        Meds/Allergies       Current Outpatient Medications:     cyclobenzaprine (FLEXERIL) 5 mg tablet    gabapentin (NEURONTIN) 300 mg capsule    sertraline (ZOLOFT) 100 mg tablet    albuterol (ProAir HFA) 90 mcg/act inhaler    amphetamine-dextroamphetamine (ADDERALL XR) 15 MG 24 hr capsule    carBAMazepine (TEGretol XR) 100 mg 12 hr tablet    famotidine (PEPCID) 40 MG tablet    hydrOXYzine HCL (ATARAX) 10 mg tablet    levonorgestrel (MIRENA) 20 MCG/24HR IUD    meloxicam (MOBIC) 15 mg tablet    Current Facility-Administered Medications:     lactated ringers infusion, 50 mL/hr, Intravenous, Continuous    Allergies   Allergen Reactions    Sulfamethoxazole Nausea Only and GI Intolerance       Objective     /69   Pulse 66   Temp 98.4 °F (36.9 °C) (Temporal)   Resp 13   SpO2 98%       PHYSICAL EXAM    Gen: NAD  Head: NCAT  CV: RRR  CHEST: Clear  ABD: soft, NT/ND  EXT: no edema      ASSESSMENT/PLAN:  This is a 41y.o. year old female here for colonoscopy, and she is stable and optimized for her procedure.

## 2023-11-20 NOTE — ANESTHESIA PREPROCEDURE EVALUATION
Procedure:  INJECTION BOTULINUM TOXIN (BOTOX), cystoscopy (Urethra)    Relevant Problems   CARDIO   (+) Chronic bilateral thoracic back pain      MUSCULOSKELETAL   (+) Chronic bilateral thoracic back pain      NEURO/PSYCH   (+) Chronic bilateral thoracic back pain   (+) Injury to L1 level of spinal cord (HCC)      Other   (+) Drop foot gait   (+) H/O spinal cord injury   (+) Neurogenic bladder        Physical Exam    Airway    Mallampati score: I  TM Distance: >3 FB  Neck ROM: full     Dental   No notable dental hx     Cardiovascular  Cardiovascular exam normal    Pulmonary  Pulmonary exam normal     Other Findings        Anesthesia Plan  ASA Score- 3     Anesthesia Type- general with ASA Monitors. Additional Monitors:   Airway Plan: LMA. Comment: I discussed risks (reviewed with patient on the anesthesia consent form), benefits and alternatives for General Anesthesia. These risks did include breathing tube remaining in place if not strong enough, PONV, damage to lips and teeth, sore throat, eye injury or blindness, risk of heart attack or stroke that may lead to death. .       Plan Factors-    Chart reviewed. Patient summary reviewed. Induction- intravenous. Postoperative Plan-     Informed Consent- Anesthetic plan and risks discussed with patient. I personally reviewed this patient with the CRNA. Discussed and agreed on the Anesthesia Plan with the CRNA. Milvia Jacob English

## 2023-11-28 ENCOUNTER — OFFICE VISIT (OUTPATIENT)
Dept: FAMILY MEDICINE CLINIC | Facility: HOSPITAL | Age: 38
End: 2023-11-28
Payer: COMMERCIAL

## 2023-11-28 VITALS
WEIGHT: 151.8 LBS | HEIGHT: 64 IN | DIASTOLIC BLOOD PRESSURE: 76 MMHG | TEMPERATURE: 98.9 F | HEART RATE: 70 BPM | SYSTOLIC BLOOD PRESSURE: 124 MMHG | BODY MASS INDEX: 25.92 KG/M2

## 2023-11-28 DIAGNOSIS — J01.00 ACUTE NON-RECURRENT MAXILLARY SINUSITIS: Primary | ICD-10-CM

## 2023-11-28 PROCEDURE — 99213 OFFICE O/P EST LOW 20 MIN: CPT | Performed by: NURSE PRACTITIONER

## 2023-11-28 RX ORDER — AMOXICILLIN AND CLAVULANATE POTASSIUM 875; 125 MG/1; MG/1
1 TABLET, FILM COATED ORAL EVERY 12 HOURS SCHEDULED
Qty: 20 TABLET | Refills: 0 | Status: SHIPPED | OUTPATIENT
Start: 2023-11-28 | End: 2023-12-08

## 2023-11-28 NOTE — PROGRESS NOTES
Name: Yury Arceo      : 1985      MRN: [id number]  Encounter Provider: EUSEBIA Hodgson  Encounter Date: 2023   Encounter department: [address]     1. Acute non-recurrent maxillary sinusitis  -     amoxicillin-clavulanate (AUGMENTIN) 875-125 mg per tablet; Take 1 tablet by mouth every 12 (twelve) hours for 10 days      Will rx antibiotic given persistent/worse symptoms  Continue OTC meds prn, rest and push fluids  Reviewed worse/persistent sx's to call with       Subjective      Has been sick for 1 month. No relief w/OTC meds. Covid test was negative awhile ago. Review of Systems   Constitutional:  Positive for fever (low grade at times. ). HENT:  Positive for congestion, postnasal drip and rhinorrhea. Negative for sore throat. Respiratory:  Positive for cough (she attributes to PND).         Current Outpatient Medications on File Prior to Visit   Medication Sig    albuterol (ProAir HFA) 90 mcg/act inhaler Inhale 2 puffs every 6 (six) hours as needed for wheezing or shortness of breath    amphetamine-dextroamphetamine (ADDERALL XR) 15 MG 24 hr capsule Take 15 mg by mouth daily in the early morning    famotidine (PEPCID) 40 MG tablet Take 1 tablet (40 mg total) by mouth daily (Patient taking differently: Take 40 mg by mouth if needed)    gabapentin (NEURONTIN) 300 mg capsule 300 mg    hydrOXYzine HCL (ATARAX) 10 mg tablet As needed for anxiety    levonorgestrel (MIRENA) 20 MCG/24HR IUD 20 mcg/day    sertraline (ZOLOFT) 100 mg tablet Take 100 mg by mouth daily    carBAMazepine (TEGretol XR) 100 mg 12 hr tablet Take 100 mg by mouth in the morning (Patient not taking: Reported on 2023)    cyclobenzaprine (FLEXERIL) 5 mg tablet Take 1 tablet (5 mg total) by mouth 3 (three) times a day as needed for muscle spasms (Patient not taking: Reported on 2023)    meloxicam (MOBIC) 15 mg tablet Take 1 tablet (15 mg total) by mouth daily (Patient not taking: Reported on 11/28/2023)       Objective     /76   Pulse 70   Temp 98.9 °F (37.2 °C)   Ht 5' 4" (1.626 m)   Wt 68.9 kg (151 lb 12.8 oz)   BMI 26.06 kg/m²       Physical Exam  Vitals reviewed. Constitutional:       General: She is not in acute distress. Appearance: Normal appearance. HENT:      Head: Normocephalic. Nose: Congestion present. Right Sinus: Maxillary sinus tenderness present. Left Sinus: Maxillary sinus tenderness present. Mouth/Throat:      Mouth: Mucous membranes are moist.      Pharynx: Oropharynx is clear. Eyes:      General: No scleral icterus. Cardiovascular:      Rate and Rhythm: Normal rate and regular rhythm. Heart sounds: No murmur heard. Pulmonary:      Effort: Pulmonary effort is normal. No respiratory distress. Breath sounds: Normal breath sounds. Musculoskeletal:      Cervical back: Normal range of motion. Lymphadenopathy:      Cervical: No cervical adenopathy. Skin:     General: Skin is warm and dry. Neurological:      General: No focal deficit present. Mental Status: She is alert and oriented to person, place, and time. Psychiatric:         Mood and Affect: Mood normal.         Behavior: Behavior normal.         Thought Content:  Thought content normal.         Judgment: Judgment normal.         EUSEBIA Kilgore

## 2024-02-21 PROBLEM — R09.89 THROAT CLEARING: Status: RESOLVED | Noted: 2020-04-27 | Resolved: 2024-02-21

## 2024-08-05 ENCOUNTER — TELEPHONE (OUTPATIENT)
Dept: GASTROENTEROLOGY | Facility: CLINIC | Age: 39
End: 2024-08-05

## (undated) DEVICE — NEEDLE 18 G X 1 1/2

## (undated) DEVICE — SURGICAL GOWN, XL SMARTSLEEVE: Brand: CONVERTORS

## (undated) DEVICE — CYSTO TUBING SINGLE IRRIGATION

## (undated) DEVICE — CYSTO TUBING TUR Y IRRIGATION

## (undated) DEVICE — CYSTOSCOPY PACK: Brand: CONVERTORS

## (undated) DEVICE — GLOVE SRG BIOGEL ECLIPSE 7.5

## (undated) DEVICE — GAUZE SPONGES,16 PLY: Brand: CURITY

## (undated) DEVICE — BONEE® NEEDLE FOR BLADDER INJECTION CH FR 05, 22G, 35 CM, BOX OF 1: Brand: PORGES COLOPLAST

## (undated) DEVICE — SYRINGE 10ML LL

## (undated) DEVICE — GLOVE INDICATOR PI UNDERGLOVE SZ 8 BLUE

## (undated) DEVICE — PREMIUM DRY TRAY LF: Brand: MEDLINE INDUSTRIES, INC.

## (undated) DEVICE — SYRINGE 3ML LL

## (undated) DEVICE — SINGLE PORT MANIFOLD: Brand: NEPTUNE 2

## (undated) DEVICE — ASTOUND STANDARD SURGICAL GOWN, XL: Brand: CONVERTORS

## (undated) DEVICE — TUBING SUCTION 5MM X 12 FT

## (undated) DEVICE — UROCATCH BAG

## (undated) DEVICE — 4-PORT MANIFOLD: Brand: NEPTUNE 2

## (undated) DEVICE — GLOVE SRG BIOGEL 7.5

## (undated) DEVICE — EXIDINE 4 PCT

## (undated) DEVICE — SKIN MARKER DUAL TIP WITH RULER CAP, FLEXIBLE RULER AND LABELS: Brand: DEVON

## (undated) DEVICE — SCD SEQUENTIAL COMPRESSION COMFORT SLEEVE MEDIUM KNEE LENGTH: Brand: KENDALL SCD

## (undated) DEVICE — GLOVE SRG BIOGEL 8

## (undated) DEVICE — CATH URETERAL 5FR X 70 CM FLEX TIP POLYUR BARD

## (undated) DEVICE — STANDARD SURGICAL GOWN, L: Brand: CONVERTORS

## (undated) DEVICE — SYRINGE 1ML TB 25G X 5/8 NON SAFETY

## (undated) DEVICE — INVIEW CLEAR LEGGINGS: Brand: CONVERTORS

## (undated) DEVICE — PACK TUR

## (undated) DEVICE — UROLOGIC DRAIN BAG: Brand: UNBRANDED

## (undated) DEVICE — SPONGE STICK WITH PVP-I: Brand: KENDALL

## (undated) DEVICE — ASTOUND FABRIC REINFORCED SURGICAL GOWN: Brand: CONVERTORS